# Patient Record
Sex: MALE
[De-identification: names, ages, dates, MRNs, and addresses within clinical notes are randomized per-mention and may not be internally consistent; named-entity substitution may affect disease eponyms.]

---

## 2021-09-05 ENCOUNTER — TRANSCRIPTION ENCOUNTER (OUTPATIENT)
Age: 66
End: 2021-09-05

## 2024-06-06 PROBLEM — Z00.00 ENCOUNTER FOR PREVENTIVE HEALTH EXAMINATION: Status: ACTIVE | Noted: 2024-06-06

## 2024-06-07 ENCOUNTER — NON-APPOINTMENT (OUTPATIENT)
Age: 69
End: 2024-06-07

## 2024-06-07 ENCOUNTER — APPOINTMENT (OUTPATIENT)
Dept: ORTHOPEDIC SURGERY | Facility: CLINIC | Age: 69
End: 2024-06-07

## 2024-07-23 ENCOUNTER — APPOINTMENT (OUTPATIENT)
Dept: ORTHOPEDIC SURGERY | Facility: CLINIC | Age: 69
End: 2024-07-23
Payer: MEDICARE

## 2024-07-23 DIAGNOSIS — S69.92XA UNSPECIFIED INJURY OF LEFT WRIST, HAND AND FINGER(S), INITIAL ENCOUNTER: ICD-10-CM

## 2024-07-23 PROCEDURE — 99203 OFFICE O/P NEW LOW 30 MIN: CPT | Mod: 25

## 2024-07-23 PROCEDURE — 73110 X-RAY EXAM OF WRIST: CPT | Mod: LT

## 2024-07-23 NOTE — ASSESSMENT
[FreeTextEntry1] : Recommending a thumb spica wrist brace.  MRI left wrist evaluate scaphoid.  Follow-up with MRI is completed in our hand department.

## 2024-07-23 NOTE — HISTORY OF PRESENT ILLNESS
[de-identified] : 68-year-old male comes in today for evaluation of his left wrist pain.  Patient states that he had a fall 2 months ago, was not seen or treated at the time of the injury.  History of cardiac surgery.  He states that they did go through his left wrist.  States surgery was in January.  History of diabetes, hypertension

## 2024-07-23 NOTE — IMAGING
[de-identified] : On examination of the left wrist no significant swelling, no ecchymosis, no erythema.  Skin is intact.  He has moderate point tenderness directly over the scaphoid/snuffbox.  Mild tenderness over the radial styloid.  No tenderness over the ulnar styloid, no tenderness of the TFCC.  No tenderness over the metacarpals or fingers.  Does make a full fist.  Discomfort to wrist flexion and extension.  Neurovascularly intact  X-ray left wrist in the office today no obvious fracture or dislocation, surgical clips most likely from prior cardiac surgery

## 2024-08-07 ENCOUNTER — APPOINTMENT (OUTPATIENT)
Dept: ORTHOPEDIC SURGERY | Facility: CLINIC | Age: 69
End: 2024-08-07

## 2024-08-15 ENCOUNTER — APPOINTMENT (OUTPATIENT)
Dept: MRI IMAGING | Facility: CLINIC | Age: 69
End: 2024-08-15
Payer: MEDICARE

## 2024-08-15 PROCEDURE — 73221 MRI JOINT UPR EXTREM W/O DYE: CPT | Mod: LT

## 2024-09-23 ENCOUNTER — APPOINTMENT (OUTPATIENT)
Dept: ORTHOPEDIC SURGERY | Facility: CLINIC | Age: 69
End: 2024-09-23
Payer: MEDICARE

## 2024-09-23 DIAGNOSIS — S69.92XA UNSPECIFIED INJURY OF LEFT WRIST, HAND AND FINGER(S), INITIAL ENCOUNTER: ICD-10-CM

## 2024-09-23 PROCEDURE — 99204 OFFICE O/P NEW MOD 45 MIN: CPT

## 2024-09-23 RX ORDER — DICLOFENAC SODIUM 50 MG/1
50 TABLET, DELAYED RELEASE ORAL
Qty: 60 | Refills: 1 | Status: ACTIVE | COMMUNITY
Start: 2024-09-23 | End: 1900-01-01

## 2024-09-23 NOTE — ASSESSMENT
[FreeTextEntry1] : Patient comes in with complaints of left wrist pain.  He had it a CABG in January.  About 2 to 3 months afterwards he fell around February or March onto his left wrist.  He thought he sprained it and it was getting better but he still has pain.  He comes in with complaints of significant pain in the wrist.  He was sent to get an MRI.  He was given a cock up wrist splint he does not wear it regularly.  He takes ibuprofen on a as needed basis.  lt wrist: tenderness to palpationGlobally throughout the wrist, tender palpation over SL, LT, TFCC, tender palpation over volar wrist, tender palpation over snuffbox, full range of motion of fingers, negative provocative test, neurovascularly intact  MRI of the left wrist shows moderate TFCC tear, radiocarpal effusion and mild tendinitis no evidence of fracture  Discussed that the object seen on his x-ray is from a prior surgery. 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  I believe that was most likely present prior to his fall but everything got worse afterwards. Discussed benefit to inflamed areas can be expected while wearing a brace as it relieves pressure on the aforementioned areas in his left wrist. I recommend wearing a brace whenever possible. The goal is to shut down his wrist to minimize exacerbation of the inflammation.  Discussed option for another rx of an nsaid if Ibuprofen stops showing any benefit. will f/u in 1 month for evaluation.

## 2024-10-23 ENCOUNTER — APPOINTMENT (OUTPATIENT)
Dept: ORTHOPEDIC SURGERY | Facility: CLINIC | Age: 69
End: 2024-10-23

## 2025-01-01 ENCOUNTER — INPATIENT (INPATIENT)
Facility: HOSPITAL | Age: 70
LOS: 1 days | Discharge: HOME HEALTH | DRG: 189 | End: 2025-06-03
Attending: INTERNAL MEDICINE | Admitting: STUDENT IN AN ORGANIZED HEALTH CARE EDUCATION/TRAINING PROGRAM
Payer: MEDICARE

## 2025-01-01 ENCOUNTER — INPATIENT (INPATIENT)
Facility: HOSPITAL | Age: 70
LOS: 3 days | DRG: 870 | End: 2025-06-10
Attending: INTERNAL MEDICINE | Admitting: INTERNAL MEDICINE
Payer: MEDICARE

## 2025-01-01 ENCOUNTER — TRANSCRIPTION ENCOUNTER (OUTPATIENT)
Age: 70
End: 2025-01-01

## 2025-01-01 ENCOUNTER — RESULT REVIEW (OUTPATIENT)
Age: 70
End: 2025-01-01

## 2025-01-01 ENCOUNTER — NON-APPOINTMENT (OUTPATIENT)
Age: 70
End: 2025-01-01

## 2025-01-01 VITALS
HEART RATE: 66 BPM | DIASTOLIC BLOOD PRESSURE: 73 MMHG | RESPIRATION RATE: 16 BRPM | SYSTOLIC BLOOD PRESSURE: 162 MMHG | OXYGEN SATURATION: 100 %

## 2025-01-01 VITALS — RESPIRATION RATE: 8 BRPM

## 2025-01-01 VITALS
RESPIRATION RATE: 20 BRPM | HEART RATE: 64 BPM | OXYGEN SATURATION: 100 % | SYSTOLIC BLOOD PRESSURE: 179 MMHG | DIASTOLIC BLOOD PRESSURE: 83 MMHG

## 2025-01-01 VITALS — RESPIRATION RATE: 20 BRPM | HEART RATE: 82 BPM | DIASTOLIC BLOOD PRESSURE: 70 MMHG | SYSTOLIC BLOOD PRESSURE: 140 MMHG

## 2025-01-01 DIAGNOSIS — R09.89 OTHER SPECIFIED SYMPTOMS AND SIGNS INVOLVING THE CIRCULATORY AND RESPIRATORY SYSTEMS: ICD-10-CM

## 2025-01-01 DIAGNOSIS — J96.01 ACUTE RESPIRATORY FAILURE WITH HYPOXIA: ICD-10-CM

## 2025-01-01 DIAGNOSIS — I46.9 CARDIAC ARREST, CAUSE UNSPECIFIED: ICD-10-CM

## 2025-01-01 DIAGNOSIS — Z51.5 ENCOUNTER FOR PALLIATIVE CARE: ICD-10-CM

## 2025-01-01 DIAGNOSIS — Z71.89 OTHER SPECIFIED COUNSELING: ICD-10-CM

## 2025-01-01 DIAGNOSIS — J81.0 ACUTE PULMONARY EDEMA: ICD-10-CM

## 2025-01-01 LAB
A1C WITH ESTIMATED AVERAGE GLUCOSE RESULT: 6.5 % — HIGH (ref 4–5.6)
A1C WITH ESTIMATED AVERAGE GLUCOSE RESULT: 6.8 % — HIGH (ref 4–5.6)
ALBUMIN SERPL ELPH-MCNC: 2.4 G/DL — LOW (ref 3.5–5.2)
ALBUMIN SERPL ELPH-MCNC: 2.5 G/DL — LOW (ref 3.5–5.2)
ALBUMIN SERPL ELPH-MCNC: 2.5 G/DL — LOW (ref 3.5–5.2)
ALBUMIN SERPL ELPH-MCNC: 2.7 G/DL — LOW (ref 3.5–5.2)
ALBUMIN SERPL ELPH-MCNC: 3 G/DL — LOW (ref 3.5–5.2)
ALBUMIN SERPL ELPH-MCNC: 3.1 G/DL — LOW (ref 3.5–5.2)
ALBUMIN SERPL ELPH-MCNC: 3.3 G/DL — LOW (ref 3.5–5.2)
ALBUMIN SERPL ELPH-MCNC: 3.4 G/DL — LOW (ref 3.5–5.2)
ALBUMIN SERPL ELPH-MCNC: 3.5 G/DL — SIGNIFICANT CHANGE UP (ref 3.5–5.2)
ALBUMIN SERPL ELPH-MCNC: 4.1 G/DL — SIGNIFICANT CHANGE UP (ref 3.5–5.2)
ALBUMIN SERPL ELPH-MCNC: 4.4 G/DL — SIGNIFICANT CHANGE UP (ref 3.5–5.2)
ALDOST SERPL-MCNC: 4.4 NG/DL — SIGNIFICANT CHANGE UP
ALDOSTERONE / DIRECT RENIN RATIO RESULT: 0.1 RATIO — SIGNIFICANT CHANGE UP
ALP SERPL-CCNC: 106 U/L — SIGNIFICANT CHANGE UP (ref 30–115)
ALP SERPL-CCNC: 112 U/L — SIGNIFICANT CHANGE UP (ref 30–115)
ALP SERPL-CCNC: 116 U/L — HIGH (ref 30–115)
ALP SERPL-CCNC: 123 U/L — HIGH (ref 30–115)
ALP SERPL-CCNC: 128 U/L — HIGH (ref 30–115)
ALP SERPL-CCNC: 146 U/L — HIGH (ref 30–115)
ALP SERPL-CCNC: 156 U/L — HIGH (ref 30–115)
ALP SERPL-CCNC: 157 U/L — HIGH (ref 30–115)
ALP SERPL-CCNC: 83 U/L — SIGNIFICANT CHANGE UP (ref 30–115)
ALP SERPL-CCNC: 89 U/L — SIGNIFICANT CHANGE UP (ref 30–115)
ALP SERPL-CCNC: 98 U/L — SIGNIFICANT CHANGE UP (ref 30–115)
ALT FLD-CCNC: 18 U/L — SIGNIFICANT CHANGE UP (ref 0–41)
ALT FLD-CCNC: 20 U/L — SIGNIFICANT CHANGE UP (ref 0–41)
ALT FLD-CCNC: 20 U/L — SIGNIFICANT CHANGE UP (ref 0–41)
ALT FLD-CCNC: 26 U/L — SIGNIFICANT CHANGE UP (ref 0–41)
ALT FLD-CCNC: 26 U/L — SIGNIFICANT CHANGE UP (ref 0–41)
ALT FLD-CCNC: 27 U/L — SIGNIFICANT CHANGE UP (ref 0–41)
ALT FLD-CCNC: 27 U/L — SIGNIFICANT CHANGE UP (ref 0–41)
ALT FLD-CCNC: 36 U/L — SIGNIFICANT CHANGE UP (ref 0–41)
ALT FLD-CCNC: 40 U/L — SIGNIFICANT CHANGE UP (ref 0–41)
ALT FLD-CCNC: 41 U/L — SIGNIFICANT CHANGE UP (ref 0–41)
ALT FLD-CCNC: 44 U/L — HIGH (ref 0–41)
ALT FLD-CCNC: 52 U/L — HIGH (ref 0–41)
ALT FLD-CCNC: 66 U/L — HIGH (ref 0–41)
ANION GAP SERPL CALC-SCNC: 11 MMOL/L — SIGNIFICANT CHANGE UP (ref 7–14)
ANION GAP SERPL CALC-SCNC: 11 MMOL/L — SIGNIFICANT CHANGE UP (ref 7–14)
ANION GAP SERPL CALC-SCNC: 12 MMOL/L — SIGNIFICANT CHANGE UP (ref 7–14)
ANION GAP SERPL CALC-SCNC: 13 MMOL/L — SIGNIFICANT CHANGE UP (ref 7–14)
ANION GAP SERPL CALC-SCNC: 14 MMOL/L — SIGNIFICANT CHANGE UP (ref 7–14)
ANION GAP SERPL CALC-SCNC: 14 MMOL/L — SIGNIFICANT CHANGE UP (ref 7–14)
ANION GAP SERPL CALC-SCNC: 15 MMOL/L — HIGH (ref 7–14)
ANION GAP SERPL CALC-SCNC: 15 MMOL/L — HIGH (ref 7–14)
ANION GAP SERPL CALC-SCNC: 16 MMOL/L — HIGH (ref 7–14)
ANION GAP SERPL CALC-SCNC: 19 MMOL/L — HIGH (ref 7–14)
ANION GAP SERPL CALC-SCNC: 7 MMOL/L — SIGNIFICANT CHANGE UP (ref 7–14)
APPEARANCE UR: ABNORMAL
APPEARANCE UR: ABNORMAL
APPEARANCE UR: CLEAR — SIGNIFICANT CHANGE UP
APPEARANCE UR: CLEAR — SIGNIFICANT CHANGE UP
APTT BLD: 106.2 SEC — CRITICAL HIGH (ref 27–39.2)
APTT BLD: 113.1 SEC — CRITICAL HIGH (ref 27–39.2)
APTT BLD: 154.1 SEC — CRITICAL HIGH (ref 27–39.2)
APTT BLD: 27.7 SEC — SIGNIFICANT CHANGE UP (ref 27–39.2)
APTT BLD: 28.1 SEC — SIGNIFICANT CHANGE UP (ref 27–39.2)
APTT BLD: 28.2 SEC — SIGNIFICANT CHANGE UP (ref 27–39.2)
APTT BLD: 30.2 SEC — SIGNIFICANT CHANGE UP (ref 27–39.2)
APTT BLD: 68 SEC — HIGH (ref 27–39.2)
APTT BLD: 68.2 SEC — HIGH (ref 27–39.2)
APTT BLD: 77 SEC — CRITICAL HIGH (ref 27–39.2)
APTT BLD: 98.1 SEC — CRITICAL HIGH (ref 27–39.2)
APTT BLD: >200 SEC — CRITICAL HIGH (ref 27–39.2)
AST SERPL-CCNC: 16 U/L — SIGNIFICANT CHANGE UP (ref 0–41)
AST SERPL-CCNC: 20 U/L — SIGNIFICANT CHANGE UP (ref 0–41)
AST SERPL-CCNC: 26 U/L — SIGNIFICANT CHANGE UP (ref 0–41)
AST SERPL-CCNC: 28 U/L — SIGNIFICANT CHANGE UP (ref 0–41)
AST SERPL-CCNC: 31 U/L — SIGNIFICANT CHANGE UP (ref 0–41)
AST SERPL-CCNC: 35 U/L — SIGNIFICANT CHANGE UP (ref 0–41)
AST SERPL-CCNC: 36 U/L — SIGNIFICANT CHANGE UP (ref 0–41)
AST SERPL-CCNC: 45 U/L — HIGH (ref 0–41)
AST SERPL-CCNC: 62 U/L — HIGH (ref 0–41)
AST SERPL-CCNC: 92 U/L — HIGH (ref 0–41)
AST SERPL-CCNC: 98 U/L — HIGH (ref 0–41)
B-OH-BUTYR SERPL-SCNC: <0.2 MMOL/L — SIGNIFICANT CHANGE UP
B-OH-BUTYR SERPL-SCNC: <0.2 MMOL/L — SIGNIFICANT CHANGE UP
BASE EXCESS BLDA CALC-SCNC: -4.4 MMOL/L — LOW (ref -2–3)
BASE EXCESS BLDV CALC-SCNC: -6.7 MMOL/L — LOW (ref -2–3)
BASOPHILS # BLD AUTO: 0 K/UL — SIGNIFICANT CHANGE UP (ref 0–0.2)
BASOPHILS # BLD AUTO: 0.01 K/UL — SIGNIFICANT CHANGE UP (ref 0–0.2)
BASOPHILS # BLD AUTO: 0.02 K/UL — SIGNIFICANT CHANGE UP (ref 0–0.2)
BASOPHILS # BLD AUTO: 0.02 K/UL — SIGNIFICANT CHANGE UP (ref 0–0.2)
BASOPHILS # BLD AUTO: 0.04 K/UL — SIGNIFICANT CHANGE UP (ref 0–0.2)
BASOPHILS # BLD AUTO: 0.04 K/UL — SIGNIFICANT CHANGE UP (ref 0–0.2)
BASOPHILS # BLD AUTO: 0.06 K/UL — SIGNIFICANT CHANGE UP (ref 0–0.2)
BASOPHILS NFR BLD AUTO: 0 % — SIGNIFICANT CHANGE UP (ref 0–1)
BASOPHILS NFR BLD AUTO: 0.1 % — SIGNIFICANT CHANGE UP (ref 0–1)
BASOPHILS NFR BLD AUTO: 0.2 % — SIGNIFICANT CHANGE UP (ref 0–1)
BASOPHILS NFR BLD AUTO: 0.2 % — SIGNIFICANT CHANGE UP (ref 0–1)
BASOPHILS NFR BLD AUTO: 0.3 % — SIGNIFICANT CHANGE UP (ref 0–1)
BASOPHILS NFR BLD AUTO: 0.4 % — SIGNIFICANT CHANGE UP (ref 0–1)
BASOPHILS NFR BLD AUTO: 0.6 % — SIGNIFICANT CHANGE UP (ref 0–1)
BILIRUB SERPL-MCNC: 0.2 MG/DL — SIGNIFICANT CHANGE UP (ref 0.2–1.2)
BILIRUB SERPL-MCNC: 0.2 MG/DL — SIGNIFICANT CHANGE UP (ref 0.2–1.2)
BILIRUB SERPL-MCNC: 0.3 MG/DL — SIGNIFICANT CHANGE UP (ref 0.2–1.2)
BILIRUB SERPL-MCNC: 0.4 MG/DL — SIGNIFICANT CHANGE UP (ref 0.2–1.2)
BILIRUB SERPL-MCNC: 0.4 MG/DL — SIGNIFICANT CHANGE UP (ref 0.2–1.2)
BILIRUB SERPL-MCNC: 0.5 MG/DL — SIGNIFICANT CHANGE UP (ref 0.2–1.2)
BILIRUB SERPL-MCNC: 0.6 MG/DL — SIGNIFICANT CHANGE UP (ref 0.2–1.2)
BILIRUB UR-MCNC: ABNORMAL
BILIRUB UR-MCNC: NEGATIVE — SIGNIFICANT CHANGE UP
BUN SERPL-MCNC: 25 MG/DL — HIGH (ref 10–20)
BUN SERPL-MCNC: 27 MG/DL — HIGH (ref 10–20)
BUN SERPL-MCNC: 27 MG/DL — HIGH (ref 10–20)
BUN SERPL-MCNC: 32 MG/DL — HIGH (ref 10–20)
BUN SERPL-MCNC: 33 MG/DL — HIGH (ref 10–20)
BUN SERPL-MCNC: 36 MG/DL — HIGH (ref 10–20)
BUN SERPL-MCNC: 37 MG/DL — HIGH (ref 10–20)
BUN SERPL-MCNC: 37 MG/DL — HIGH (ref 10–20)
BUN SERPL-MCNC: 39 MG/DL — HIGH (ref 10–20)
BUN SERPL-MCNC: 39 MG/DL — HIGH (ref 10–20)
BUN SERPL-MCNC: 42 MG/DL — HIGH (ref 10–20)
BUN SERPL-MCNC: 43 MG/DL — HIGH (ref 10–20)
BUN SERPL-MCNC: 46 MG/DL — HIGH (ref 10–20)
BUN SERPL-MCNC: 46 MG/DL — HIGH (ref 10–20)
BUN SERPL-MCNC: 50 MG/DL — HIGH (ref 10–20)
BURR CELLS BLD QL SMEAR: PRESENT — SIGNIFICANT CHANGE UP
CA-I SERPL-SCNC: 1.2 MMOL/L — SIGNIFICANT CHANGE UP (ref 1.15–1.33)
CALCIUM SERPL-MCNC: 7.2 MG/DL — LOW (ref 8.4–10.5)
CALCIUM SERPL-MCNC: 7.3 MG/DL — LOW (ref 8.4–10.5)
CALCIUM SERPL-MCNC: 7.3 MG/DL — LOW (ref 8.4–10.5)
CALCIUM SERPL-MCNC: 7.5 MG/DL — LOW (ref 8.4–10.5)
CALCIUM SERPL-MCNC: 7.6 MG/DL — LOW (ref 8.4–10.5)
CALCIUM SERPL-MCNC: 7.9 MG/DL — LOW (ref 8.4–10.5)
CALCIUM SERPL-MCNC: 8.1 MG/DL — LOW (ref 8.4–10.5)
CALCIUM SERPL-MCNC: 8.4 MG/DL — SIGNIFICANT CHANGE UP (ref 8.4–10.5)
CALCIUM SERPL-MCNC: 8.5 MG/DL — SIGNIFICANT CHANGE UP (ref 8.4–10.5)
CALCIUM SERPL-MCNC: 8.6 MG/DL — SIGNIFICANT CHANGE UP (ref 8.4–10.5)
CALCIUM SERPL-MCNC: 8.9 MG/DL — SIGNIFICANT CHANGE UP (ref 8.4–10.5)
CALCIUM SERPL-MCNC: 9.1 MG/DL — SIGNIFICANT CHANGE UP (ref 8.4–10.5)
CALCIUM SERPL-MCNC: 9.3 MG/DL — SIGNIFICANT CHANGE UP (ref 8.4–10.5)
CHLORIDE SERPL-SCNC: 100 MMOL/L — SIGNIFICANT CHANGE UP (ref 98–110)
CHLORIDE SERPL-SCNC: 101 MMOL/L — SIGNIFICANT CHANGE UP (ref 98–110)
CHLORIDE SERPL-SCNC: 102 MMOL/L — SIGNIFICANT CHANGE UP (ref 98–110)
CHLORIDE SERPL-SCNC: 103 MMOL/L — SIGNIFICANT CHANGE UP (ref 98–110)
CHLORIDE SERPL-SCNC: 103 MMOL/L — SIGNIFICANT CHANGE UP (ref 98–110)
CHLORIDE SERPL-SCNC: 104 MMOL/L — SIGNIFICANT CHANGE UP (ref 98–110)
CHLORIDE SERPL-SCNC: 105 MMOL/L — SIGNIFICANT CHANGE UP (ref 98–110)
CHLORIDE SERPL-SCNC: 105 MMOL/L — SIGNIFICANT CHANGE UP (ref 98–110)
CHLORIDE SERPL-SCNC: 106 MMOL/L — SIGNIFICANT CHANGE UP (ref 98–110)
CHLORIDE SERPL-SCNC: 107 MMOL/L — SIGNIFICANT CHANGE UP (ref 98–110)
CHLORIDE SERPL-SCNC: 98 MMOL/L — SIGNIFICANT CHANGE UP (ref 98–110)
CHLORIDE SERPL-SCNC: 98 MMOL/L — SIGNIFICANT CHANGE UP (ref 98–110)
CHLORIDE SERPL-SCNC: 99 MMOL/L — SIGNIFICANT CHANGE UP (ref 98–110)
CHOLEST SERPL-MCNC: 205 MG/DL — HIGH
CHOLEST SERPL-MCNC: 223 MG/DL — HIGH
CO2 SERPL-SCNC: 18 MMOL/L — SIGNIFICANT CHANGE UP (ref 17–32)
CO2 SERPL-SCNC: 19 MMOL/L — SIGNIFICANT CHANGE UP (ref 17–32)
CO2 SERPL-SCNC: 19 MMOL/L — SIGNIFICANT CHANGE UP (ref 17–32)
CO2 SERPL-SCNC: 20 MMOL/L — SIGNIFICANT CHANGE UP (ref 17–32)
CO2 SERPL-SCNC: 21 MMOL/L — SIGNIFICANT CHANGE UP (ref 17–32)
CO2 SERPL-SCNC: 23 MMOL/L — SIGNIFICANT CHANGE UP (ref 17–32)
CO2 SERPL-SCNC: 24 MMOL/L — SIGNIFICANT CHANGE UP (ref 17–32)
CO2 SERPL-SCNC: 24 MMOL/L — SIGNIFICANT CHANGE UP (ref 17–32)
CO2 SERPL-SCNC: 25 MMOL/L — SIGNIFICANT CHANGE UP (ref 17–32)
COLOR SPEC: SIGNIFICANT CHANGE UP
COLOR SPEC: SIGNIFICANT CHANGE UP
COLOR SPEC: YELLOW — SIGNIFICANT CHANGE UP
COLOR SPEC: YELLOW — SIGNIFICANT CHANGE UP
CREAT ?TM UR-MCNC: 142 MG/DL — SIGNIFICANT CHANGE UP
CREAT ?TM UR-MCNC: 279 MG/DL — SIGNIFICANT CHANGE UP
CREAT ?TM UR-MCNC: 38 MG/DL — SIGNIFICANT CHANGE UP
CREAT SERPL-MCNC: 1.4 MG/DL — SIGNIFICANT CHANGE UP (ref 0.7–1.5)
CREAT SERPL-MCNC: 1.6 MG/DL — HIGH (ref 0.7–1.5)
CREAT SERPL-MCNC: 1.7 MG/DL — HIGH (ref 0.7–1.5)
CREAT SERPL-MCNC: 1.7 MG/DL — HIGH (ref 0.7–1.5)
CREAT SERPL-MCNC: 1.8 MG/DL — HIGH (ref 0.7–1.5)
CREAT SERPL-MCNC: 1.8 MG/DL — HIGH (ref 0.7–1.5)
CREAT SERPL-MCNC: 1.9 MG/DL — HIGH (ref 0.7–1.5)
CREAT SERPL-MCNC: 2 MG/DL — HIGH (ref 0.7–1.5)
CREAT SERPL-MCNC: 2.2 MG/DL — HIGH (ref 0.7–1.5)
CREAT SERPL-MCNC: 2.5 MG/DL — HIGH (ref 0.7–1.5)
CREAT SERPL-MCNC: 2.6 MG/DL — HIGH (ref 0.7–1.5)
CREAT SERPL-MCNC: 2.7 MG/DL — HIGH (ref 0.7–1.5)
CREAT SERPL-MCNC: 2.8 MG/DL — HIGH (ref 0.7–1.5)
CREAT SERPL-MCNC: 2.9 MG/DL — HIGH (ref 0.7–1.5)
CREAT SERPL-MCNC: 2.9 MG/DL — HIGH (ref 0.7–1.5)
DIFF PNL FLD: ABNORMAL
DIFF PNL FLD: NEGATIVE — SIGNIFICANT CHANGE UP
EGFR: 22 ML/MIN/1.73M2 — LOW
EGFR: 23 ML/MIN/1.73M2 — LOW
EGFR: 23 ML/MIN/1.73M2 — LOW
EGFR: 24 ML/MIN/1.73M2 — LOW
EGFR: 24 ML/MIN/1.73M2 — LOW
EGFR: 26 ML/MIN/1.73M2 — LOW
EGFR: 26 ML/MIN/1.73M2 — LOW
EGFR: 27 ML/MIN/1.73M2 — LOW
EGFR: 27 ML/MIN/1.73M2 — LOW
EGFR: 31 ML/MIN/1.73M2 — LOW
EGFR: 31 ML/MIN/1.73M2 — LOW
EGFR: 35 ML/MIN/1.73M2 — LOW
EGFR: 35 ML/MIN/1.73M2 — LOW
EGFR: 37 ML/MIN/1.73M2 — LOW
EGFR: 37 ML/MIN/1.73M2 — LOW
EGFR: 40 ML/MIN/1.73M2 — LOW
EGFR: 43 ML/MIN/1.73M2 — LOW
EGFR: 46 ML/MIN/1.73M2 — LOW
EGFR: 46 ML/MIN/1.73M2 — LOW
EGFR: 54 ML/MIN/1.73M2 — LOW
EGFR: 54 ML/MIN/1.73M2 — LOW
EOSINOPHIL # BLD AUTO: 0.01 K/UL — SIGNIFICANT CHANGE UP (ref 0–0.7)
EOSINOPHIL # BLD AUTO: 0.02 K/UL — SIGNIFICANT CHANGE UP (ref 0–0.7)
EOSINOPHIL # BLD AUTO: 0.07 K/UL — SIGNIFICANT CHANGE UP (ref 0–0.7)
EOSINOPHIL # BLD AUTO: 0.21 K/UL — SIGNIFICANT CHANGE UP (ref 0–0.7)
EOSINOPHIL # BLD AUTO: 0.24 K/UL — SIGNIFICANT CHANGE UP (ref 0–0.7)
EOSINOPHIL # BLD AUTO: 0.26 K/UL — SIGNIFICANT CHANGE UP (ref 0–0.7)
EOSINOPHIL # BLD AUTO: 0.42 K/UL — SIGNIFICANT CHANGE UP (ref 0–0.7)
EOSINOPHIL NFR BLD AUTO: 0.1 % — SIGNIFICANT CHANGE UP (ref 0–8)
EOSINOPHIL NFR BLD AUTO: 0.2 % — SIGNIFICANT CHANGE UP (ref 0–8)
EOSINOPHIL NFR BLD AUTO: 0.7 % — SIGNIFICANT CHANGE UP (ref 0–8)
EOSINOPHIL NFR BLD AUTO: 1.8 % — SIGNIFICANT CHANGE UP (ref 0–8)
EOSINOPHIL NFR BLD AUTO: 2.5 % — SIGNIFICANT CHANGE UP (ref 0–8)
EOSINOPHIL NFR BLD AUTO: 2.5 % — SIGNIFICANT CHANGE UP (ref 0–8)
EOSINOPHIL NFR BLD AUTO: 4 % — SIGNIFICANT CHANGE UP (ref 0–8)
ESTIMATED AVERAGE GLUCOSE: 140 MG/DL — HIGH (ref 68–114)
ESTIMATED AVERAGE GLUCOSE: 148 MG/DL — HIGH (ref 68–114)
FLUAV AG NPH QL: SIGNIFICANT CHANGE UP
FLUAV AG NPH QL: SIGNIFICANT CHANGE UP
FLUBV AG NPH QL: SIGNIFICANT CHANGE UP
FLUBV AG NPH QL: SIGNIFICANT CHANGE UP
GAS PNL BLDA: SIGNIFICANT CHANGE UP
GAS PNL BLDA: SIGNIFICANT CHANGE UP
GAS PNL BLDV: 133 MMOL/L — LOW (ref 136–145)
GAS PNL BLDV: SIGNIFICANT CHANGE UP
GAS PNL BLDV: SIGNIFICANT CHANGE UP
GLUCOSE BLDC GLUCOMTR-MCNC: 104 MG/DL — HIGH (ref 70–99)
GLUCOSE BLDC GLUCOMTR-MCNC: 119 MG/DL — HIGH (ref 70–99)
GLUCOSE BLDC GLUCOMTR-MCNC: 119 MG/DL — HIGH (ref 70–99)
GLUCOSE BLDC GLUCOMTR-MCNC: 130 MG/DL — HIGH (ref 70–99)
GLUCOSE BLDC GLUCOMTR-MCNC: 138 MG/DL — HIGH (ref 70–99)
GLUCOSE BLDC GLUCOMTR-MCNC: 141 MG/DL — HIGH (ref 70–99)
GLUCOSE BLDC GLUCOMTR-MCNC: 157 MG/DL — HIGH (ref 70–99)
GLUCOSE BLDC GLUCOMTR-MCNC: 170 MG/DL — HIGH (ref 70–99)
GLUCOSE BLDC GLUCOMTR-MCNC: 171 MG/DL — HIGH (ref 70–99)
GLUCOSE BLDC GLUCOMTR-MCNC: 179 MG/DL — HIGH (ref 70–99)
GLUCOSE BLDC GLUCOMTR-MCNC: 191 MG/DL — HIGH (ref 70–99)
GLUCOSE BLDC GLUCOMTR-MCNC: 191 MG/DL — HIGH (ref 70–99)
GLUCOSE BLDC GLUCOMTR-MCNC: 197 MG/DL — HIGH (ref 70–99)
GLUCOSE BLDC GLUCOMTR-MCNC: 208 MG/DL — HIGH (ref 70–99)
GLUCOSE BLDC GLUCOMTR-MCNC: 212 MG/DL — HIGH (ref 70–99)
GLUCOSE BLDC GLUCOMTR-MCNC: 218 MG/DL — HIGH (ref 70–99)
GLUCOSE BLDC GLUCOMTR-MCNC: 225 MG/DL — HIGH (ref 70–99)
GLUCOSE BLDC GLUCOMTR-MCNC: 228 MG/DL — HIGH (ref 70–99)
GLUCOSE BLDC GLUCOMTR-MCNC: 230 MG/DL — HIGH (ref 70–99)
GLUCOSE BLDC GLUCOMTR-MCNC: 240 MG/DL — HIGH (ref 70–99)
GLUCOSE BLDC GLUCOMTR-MCNC: 250 MG/DL — HIGH (ref 70–99)
GLUCOSE BLDC GLUCOMTR-MCNC: 257 MG/DL — HIGH (ref 70–99)
GLUCOSE BLDC GLUCOMTR-MCNC: 263 MG/DL — HIGH (ref 70–99)
GLUCOSE BLDC GLUCOMTR-MCNC: 280 MG/DL — HIGH (ref 70–99)
GLUCOSE BLDC GLUCOMTR-MCNC: 339 MG/DL — HIGH (ref 70–99)
GLUCOSE BLDC GLUCOMTR-MCNC: 58 MG/DL — LOW (ref 70–99)
GLUCOSE BLDC GLUCOMTR-MCNC: 68 MG/DL — LOW (ref 70–99)
GLUCOSE BLDC GLUCOMTR-MCNC: 78 MG/DL — SIGNIFICANT CHANGE UP (ref 70–99)
GLUCOSE BLDC GLUCOMTR-MCNC: 83 MG/DL — SIGNIFICANT CHANGE UP (ref 70–99)
GLUCOSE BLDC GLUCOMTR-MCNC: 86 MG/DL — SIGNIFICANT CHANGE UP (ref 70–99)
GLUCOSE BLDC GLUCOMTR-MCNC: 86 MG/DL — SIGNIFICANT CHANGE UP (ref 70–99)
GLUCOSE BLDC GLUCOMTR-MCNC: 91 MG/DL — SIGNIFICANT CHANGE UP (ref 70–99)
GLUCOSE BLDC GLUCOMTR-MCNC: 92 MG/DL — SIGNIFICANT CHANGE UP (ref 70–99)
GLUCOSE BLDC GLUCOMTR-MCNC: 92 MG/DL — SIGNIFICANT CHANGE UP (ref 70–99)
GLUCOSE BLDC GLUCOMTR-MCNC: 93 MG/DL — SIGNIFICANT CHANGE UP (ref 70–99)
GLUCOSE SERPL-MCNC: 169 MG/DL — HIGH (ref 70–99)
GLUCOSE SERPL-MCNC: 171 MG/DL — HIGH (ref 70–99)
GLUCOSE SERPL-MCNC: 178 MG/DL — HIGH (ref 70–99)
GLUCOSE SERPL-MCNC: 182 MG/DL — HIGH (ref 70–99)
GLUCOSE SERPL-MCNC: 191 MG/DL — HIGH (ref 70–99)
GLUCOSE SERPL-MCNC: 243 MG/DL — HIGH (ref 70–99)
GLUCOSE SERPL-MCNC: 247 MG/DL — HIGH (ref 70–99)
GLUCOSE SERPL-MCNC: 253 MG/DL — HIGH (ref 70–99)
GLUCOSE SERPL-MCNC: 295 MG/DL — HIGH (ref 70–99)
GLUCOSE SERPL-MCNC: 437 MG/DL — HIGH (ref 70–99)
GLUCOSE SERPL-MCNC: 61 MG/DL — LOW (ref 70–99)
GLUCOSE SERPL-MCNC: 61 MG/DL — LOW (ref 70–99)
GLUCOSE SERPL-MCNC: 73 MG/DL — SIGNIFICANT CHANGE UP (ref 70–99)
GLUCOSE SERPL-MCNC: 81 MG/DL — SIGNIFICANT CHANGE UP (ref 70–99)
GLUCOSE SERPL-MCNC: 91 MG/DL — SIGNIFICANT CHANGE UP (ref 70–99)
GLUCOSE UR QL: NEGATIVE MG/DL — SIGNIFICANT CHANGE UP
HCO3 BLDA-SCNC: 20 MMOL/L — LOW (ref 21–28)
HCO3 BLDV-SCNC: 22 MMOL/L — SIGNIFICANT CHANGE UP (ref 22–29)
HCT VFR BLD CALC: 20.6 % — LOW (ref 42–52)
HCT VFR BLD CALC: 21 % — LOW (ref 42–52)
HCT VFR BLD CALC: 22.9 % — LOW (ref 42–52)
HCT VFR BLD CALC: 24.5 % — LOW (ref 42–52)
HCT VFR BLD CALC: 24.8 % — LOW (ref 42–52)
HCT VFR BLD CALC: 25.4 % — LOW (ref 42–52)
HCT VFR BLD CALC: 27.1 % — LOW (ref 42–52)
HCT VFR BLD CALC: 27.8 % — LOW (ref 42–52)
HCT VFR BLD CALC: 28.4 % — LOW (ref 42–52)
HCT VFR BLD CALC: 29.4 % — LOW (ref 42–52)
HCT VFR BLD CALC: 29.6 % — LOW (ref 42–52)
HCT VFR BLD CALC: 31.8 % — LOW (ref 42–52)
HCT VFR BLD CALC: 32.2 % — LOW (ref 42–52)
HCT VFR BLD CALC: 33.8 % — LOW (ref 42–52)
HCT VFR BLD CALC: 36.6 % — LOW (ref 42–52)
HCT VFR BLD CALC: 38.5 % — LOW (ref 42–52)
HCT VFR BLDA CALC: 40 % — SIGNIFICANT CHANGE UP (ref 39–51)
HDLC SERPL-MCNC: 54 MG/DL — SIGNIFICANT CHANGE UP
HDLC SERPL-MCNC: 55 MG/DL — SIGNIFICANT CHANGE UP
HGB BLD CALC-MCNC: 13.2 G/DL — SIGNIFICANT CHANGE UP (ref 12.6–17.4)
HGB BLD-MCNC: 10.4 G/DL — LOW (ref 14–18)
HGB BLD-MCNC: 10.5 G/DL — LOW (ref 14–18)
HGB BLD-MCNC: 10.5 G/DL — LOW (ref 14–18)
HGB BLD-MCNC: 11.6 G/DL — LOW (ref 14–18)
HGB BLD-MCNC: 11.9 G/DL — LOW (ref 14–18)
HGB BLD-MCNC: 6.7 G/DL — CRITICAL LOW (ref 14–18)
HGB BLD-MCNC: 6.8 G/DL — CRITICAL LOW (ref 14–18)
HGB BLD-MCNC: 7.4 G/DL — LOW (ref 14–18)
HGB BLD-MCNC: 7.9 G/DL — LOW (ref 14–18)
HGB BLD-MCNC: 8 G/DL — LOW (ref 14–18)
HGB BLD-MCNC: 8.1 G/DL — LOW (ref 14–18)
HGB BLD-MCNC: 8.6 G/DL — LOW (ref 14–18)
HGB BLD-MCNC: 8.7 G/DL — LOW (ref 14–18)
HGB BLD-MCNC: 8.9 G/DL — LOW (ref 14–18)
HGB BLD-MCNC: 9.2 G/DL — LOW (ref 14–18)
HGB BLD-MCNC: 9.8 G/DL — LOW (ref 14–18)
HOROWITZ INDEX BLDA+IHG-RTO: 40 — SIGNIFICANT CHANGE UP
IMM GRANULOCYTES NFR BLD AUTO: 0.3 % — SIGNIFICANT CHANGE UP (ref 0.1–0.3)
IMM GRANULOCYTES NFR BLD AUTO: 0.4 % — HIGH (ref 0.1–0.3)
IMM GRANULOCYTES NFR BLD AUTO: 0.4 % — HIGH (ref 0.1–0.3)
IMM GRANULOCYTES NFR BLD AUTO: 0.5 % — HIGH (ref 0.1–0.3)
IMM GRANULOCYTES NFR BLD AUTO: 0.7 % — HIGH (ref 0.1–0.3)
IMM GRANULOCYTES NFR BLD AUTO: 0.8 % — HIGH (ref 0.1–0.3)
INR BLD: 1.22 RATIO — SIGNIFICANT CHANGE UP (ref 0.65–1.3)
INR BLD: 1.39 RATIO — HIGH (ref 0.65–1.3)
INR BLD: 1.52 RATIO — HIGH (ref 0.65–1.3)
KETONES UR QL: ABNORMAL MG/DL
KETONES UR QL: NEGATIVE MG/DL — SIGNIFICANT CHANGE UP
LACTATE BLDV-MCNC: 5.2 MMOL/L — CRITICAL HIGH (ref 0.5–2)
LACTATE SERPL-SCNC: 2.1 MMOL/L — HIGH (ref 0.7–2)
LACTATE SERPL-SCNC: 2.6 MMOL/L — HIGH (ref 0.7–2)
LACTATE SERPL-SCNC: 5.5 MMOL/L — CRITICAL HIGH (ref 0.7–2)
LDLC SERPL-MCNC: 133 MG/DL — HIGH
LDLC SERPL-MCNC: 155 MG/DL — HIGH
LEUKOCYTE ESTERASE UR-ACNC: ABNORMAL
LEUKOCYTE ESTERASE UR-ACNC: ABNORMAL
LEUKOCYTE ESTERASE UR-ACNC: NEGATIVE — SIGNIFICANT CHANGE UP
LEUKOCYTE ESTERASE UR-ACNC: NEGATIVE — SIGNIFICANT CHANGE UP
LIPID PNL WITH DIRECT LDL SERPL: 133 MG/DL — HIGH
LIPID PNL WITH DIRECT LDL SERPL: 155 MG/DL — HIGH
LYMPHOCYTES # BLD AUTO: 1.11 K/UL — LOW (ref 1.2–3.4)
LYMPHOCYTES # BLD AUTO: 1.19 K/UL — LOW (ref 1.2–3.4)
LYMPHOCYTES # BLD AUTO: 1.28 K/UL — SIGNIFICANT CHANGE UP (ref 1.2–3.4)
LYMPHOCYTES # BLD AUTO: 11.1 % — LOW (ref 20.5–51.1)
LYMPHOCYTES # BLD AUTO: 12.5 % — LOW (ref 20.5–51.1)
LYMPHOCYTES # BLD AUTO: 14.4 % — LOW (ref 20.5–51.1)
LYMPHOCYTES # BLD AUTO: 18 % — LOW (ref 20.5–51.1)
LYMPHOCYTES # BLD AUTO: 18.1 % — LOW (ref 20.5–51.1)
LYMPHOCYTES # BLD AUTO: 2.09 K/UL — SIGNIFICANT CHANGE UP (ref 1.2–3.4)
LYMPHOCYTES # BLD AUTO: 2.36 K/UL — SIGNIFICANT CHANGE UP (ref 1.2–3.4)
LYMPHOCYTES # BLD AUTO: 4.48 K/UL — HIGH (ref 1.2–3.4)
LYMPHOCYTES # BLD AUTO: 42.8 % — SIGNIFICANT CHANGE UP (ref 20.5–51.1)
LYMPHOCYTES # BLD AUTO: 43 % — SIGNIFICANT CHANGE UP (ref 20.5–51.1)
LYMPHOCYTES # BLD AUTO: 5.74 K/UL — HIGH (ref 1.2–3.4)
MAGNESIUM SERPL-MCNC: 1.8 MG/DL — SIGNIFICANT CHANGE UP (ref 1.8–2.4)
MAGNESIUM SERPL-MCNC: 2.1 MG/DL — SIGNIFICANT CHANGE UP (ref 1.8–2.4)
MAGNESIUM SERPL-MCNC: 2.2 MG/DL — SIGNIFICANT CHANGE UP (ref 1.8–2.4)
MAGNESIUM SERPL-MCNC: 2.4 MG/DL — SIGNIFICANT CHANGE UP (ref 1.8–2.4)
MANUAL SMEAR VERIFICATION: SIGNIFICANT CHANGE UP
MCHC RBC-ENTMCNC: 27.4 PG — SIGNIFICANT CHANGE UP (ref 27–31)
MCHC RBC-ENTMCNC: 27.6 PG — SIGNIFICANT CHANGE UP (ref 27–31)
MCHC RBC-ENTMCNC: 27.7 PG — SIGNIFICANT CHANGE UP (ref 27–31)
MCHC RBC-ENTMCNC: 27.7 PG — SIGNIFICANT CHANGE UP (ref 27–31)
MCHC RBC-ENTMCNC: 27.9 PG — SIGNIFICANT CHANGE UP (ref 27–31)
MCHC RBC-ENTMCNC: 28 PG — SIGNIFICANT CHANGE UP (ref 27–31)
MCHC RBC-ENTMCNC: 28.1 PG — SIGNIFICANT CHANGE UP (ref 27–31)
MCHC RBC-ENTMCNC: 28.2 PG — SIGNIFICANT CHANGE UP (ref 27–31)
MCHC RBC-ENTMCNC: 28.2 PG — SIGNIFICANT CHANGE UP (ref 27–31)
MCHC RBC-ENTMCNC: 28.3 PG — SIGNIFICANT CHANGE UP (ref 27–31)
MCHC RBC-ENTMCNC: 28.6 PG — SIGNIFICANT CHANGE UP (ref 27–31)
MCHC RBC-ENTMCNC: 28.6 PG — SIGNIFICANT CHANGE UP (ref 27–31)
MCHC RBC-ENTMCNC: 30.9 G/DL — LOW (ref 32–37)
MCHC RBC-ENTMCNC: 30.9 G/DL — LOW (ref 32–37)
MCHC RBC-ENTMCNC: 31.1 G/DL — LOW (ref 32–37)
MCHC RBC-ENTMCNC: 31.3 G/DL — LOW (ref 32–37)
MCHC RBC-ENTMCNC: 31.3 G/DL — LOW (ref 32–37)
MCHC RBC-ENTMCNC: 31.5 G/DL — LOW (ref 32–37)
MCHC RBC-ENTMCNC: 31.7 G/DL — LOW (ref 32–37)
MCHC RBC-ENTMCNC: 32.1 G/DL — SIGNIFICANT CHANGE UP (ref 32–37)
MCHC RBC-ENTMCNC: 32.2 G/DL — SIGNIFICANT CHANGE UP (ref 32–37)
MCHC RBC-ENTMCNC: 32.3 G/DL — SIGNIFICANT CHANGE UP (ref 32–37)
MCHC RBC-ENTMCNC: 32.4 G/DL — SIGNIFICANT CHANGE UP (ref 32–37)
MCHC RBC-ENTMCNC: 32.5 G/DL — SIGNIFICANT CHANGE UP (ref 32–37)
MCHC RBC-ENTMCNC: 32.6 G/DL — SIGNIFICANT CHANGE UP (ref 32–37)
MCHC RBC-ENTMCNC: 32.7 G/DL — SIGNIFICANT CHANGE UP (ref 32–37)
MCHC RBC-ENTMCNC: 32.7 G/DL — SIGNIFICANT CHANGE UP (ref 32–37)
MCHC RBC-ENTMCNC: 33.1 G/DL — SIGNIFICANT CHANGE UP (ref 32–37)
MCV RBC AUTO: 84.4 FL — SIGNIFICANT CHANGE UP (ref 80–94)
MCV RBC AUTO: 85.1 FL — SIGNIFICANT CHANGE UP (ref 80–94)
MCV RBC AUTO: 85.8 FL — SIGNIFICANT CHANGE UP (ref 80–94)
MCV RBC AUTO: 86.1 FL — SIGNIFICANT CHANGE UP (ref 80–94)
MCV RBC AUTO: 86.1 FL — SIGNIFICANT CHANGE UP (ref 80–94)
MCV RBC AUTO: 86.2 FL — SIGNIFICANT CHANGE UP (ref 80–94)
MCV RBC AUTO: 87.2 FL — SIGNIFICANT CHANGE UP (ref 80–94)
MCV RBC AUTO: 87.6 FL — SIGNIFICANT CHANGE UP (ref 80–94)
MCV RBC AUTO: 88.3 FL — SIGNIFICANT CHANGE UP (ref 80–94)
MCV RBC AUTO: 89.1 FL — SIGNIFICANT CHANGE UP (ref 80–94)
MCV RBC AUTO: 89.3 FL — SIGNIFICANT CHANGE UP (ref 80–94)
MCV RBC AUTO: 89.4 FL — SIGNIFICANT CHANGE UP (ref 80–94)
MCV RBC AUTO: 89.6 FL — SIGNIFICANT CHANGE UP (ref 80–94)
MCV RBC AUTO: 89.6 FL — SIGNIFICANT CHANGE UP (ref 80–94)
MCV RBC AUTO: 89.7 FL — SIGNIFICANT CHANGE UP (ref 80–94)
MCV RBC AUTO: 90.6 FL — SIGNIFICANT CHANGE UP (ref 80–94)
METAMYELOCYTES # FLD: 0.9 % — HIGH (ref 0–0)
METAMYELOCYTES NFR BLD: 0.9 % — HIGH (ref 0–0)
METANEPHRINE, PL: 38.5 PG/ML — SIGNIFICANT CHANGE UP (ref 0–88)
MONOCYTES # BLD AUTO: 0.85 K/UL — HIGH (ref 0.1–0.6)
MONOCYTES # BLD AUTO: 0.86 K/UL — HIGH (ref 0.1–0.6)
MONOCYTES # BLD AUTO: 1.01 K/UL — HIGH (ref 0.1–0.6)
MONOCYTES # BLD AUTO: 1.05 K/UL — HIGH (ref 0.1–0.6)
MONOCYTES # BLD AUTO: 1.06 K/UL — HIGH (ref 0.1–0.6)
MONOCYTES # BLD AUTO: 1.46 K/UL — HIGH (ref 0.1–0.6)
MONOCYTES # BLD AUTO: 1.55 K/UL — HIGH (ref 0.1–0.6)
MONOCYTES NFR BLD AUTO: 10.9 % — HIGH (ref 1.7–9.3)
MONOCYTES NFR BLD AUTO: 12.3 % — HIGH (ref 1.7–9.3)
MONOCYTES NFR BLD AUTO: 15.1 % — HIGH (ref 1.7–9.3)
MONOCYTES NFR BLD AUTO: 8 % — SIGNIFICANT CHANGE UP (ref 1.7–9.3)
MONOCYTES NFR BLD AUTO: 8.3 % — SIGNIFICANT CHANGE UP (ref 1.7–9.3)
MONOCYTES NFR BLD AUTO: 8.5 % — SIGNIFICANT CHANGE UP (ref 1.7–9.3)
MONOCYTES NFR BLD AUTO: 9.2 % — SIGNIFICANT CHANGE UP (ref 1.7–9.3)
MRSA PCR RESULT.: NEGATIVE — SIGNIFICANT CHANGE UP
NEUTROPHILS # BLD AUTO: 4.56 K/UL — SIGNIFICANT CHANGE UP (ref 1.4–6.5)
NEUTROPHILS # BLD AUTO: 5.73 K/UL — SIGNIFICANT CHANGE UP (ref 1.4–6.5)
NEUTROPHILS # BLD AUTO: 5.76 K/UL — SIGNIFICANT CHANGE UP (ref 1.4–6.5)
NEUTROPHILS # BLD AUTO: 7.08 K/UL — HIGH (ref 1.4–6.5)
NEUTROPHILS # BLD AUTO: 7.88 K/UL — HIGH (ref 1.4–6.5)
NEUTROPHILS # BLD AUTO: 8.35 K/UL — HIGH (ref 1.4–6.5)
NEUTROPHILS # BLD AUTO: 9.62 K/UL — HIGH (ref 1.4–6.5)
NEUTROPHILS NFR BLD AUTO: 41.8 % — LOW (ref 42.2–75.2)
NEUTROPHILS NFR BLD AUTO: 43.7 % — SIGNIFICANT CHANGE UP (ref 42.2–75.2)
NEUTROPHILS NFR BLD AUTO: 68.9 % — SIGNIFICANT CHANGE UP (ref 42.2–75.2)
NEUTROPHILS NFR BLD AUTO: 70 % — SIGNIFICANT CHANGE UP (ref 42.2–75.2)
NEUTROPHILS NFR BLD AUTO: 72.1 % — SIGNIFICANT CHANGE UP (ref 42.2–75.2)
NEUTROPHILS NFR BLD AUTO: 73.1 % — SIGNIFICANT CHANGE UP (ref 42.2–75.2)
NEUTROPHILS NFR BLD AUTO: 78.8 % — HIGH (ref 42.2–75.2)
NEUTS BAND # BLD: 0.9 % — SIGNIFICANT CHANGE UP (ref 0–6)
NEUTS BAND NFR BLD: 0.9 % — SIGNIFICANT CHANGE UP (ref 0–6)
NITRITE UR-MCNC: NEGATIVE — SIGNIFICANT CHANGE UP
NONHDLC SERPL-MCNC: 151 MG/DL — HIGH
NONHDLC SERPL-MCNC: 168 MG/DL — HIGH
NORMETANEPHRINE, PL: 540.2 PG/ML — HIGH (ref 0–285.2)
NRBC BLD AUTO-RTO: 0 /100 WBCS — SIGNIFICANT CHANGE UP (ref 0–0)
NT-PROBNP SERPL-SCNC: 2375 PG/ML — HIGH (ref 0–300)
NT-PROBNP SERPL-SCNC: 881 PG/ML — HIGH (ref 0–300)
OSMOLALITY UR: 342 MOS/KG — SIGNIFICANT CHANGE UP (ref 50–1200)
OSMOLALITY UR: 344 MOS/KG — SIGNIFICANT CHANGE UP (ref 50–1200)
OSMOLALITY UR: 352 MOS/KG — SIGNIFICANT CHANGE UP (ref 50–1200)
PCO2 BLDA: 35 MMHG — SIGNIFICANT CHANGE UP (ref 35–48)
PCO2 BLDV: 60 MMHG — HIGH (ref 42–55)
PH BLDA: 7.37 — SIGNIFICANT CHANGE UP (ref 7.35–7.45)
PH BLDV: 7.18 — CRITICAL LOW (ref 7.32–7.43)
PH UR: 5 — SIGNIFICANT CHANGE UP (ref 5–8)
PH UR: 5 — SIGNIFICANT CHANGE UP (ref 5–8)
PH UR: 5.5 — SIGNIFICANT CHANGE UP (ref 5–8)
PH UR: 5.5 — SIGNIFICANT CHANGE UP (ref 5–8)
PHOSPHATE SERPL-MCNC: 3.2 MG/DL — SIGNIFICANT CHANGE UP (ref 2.1–4.9)
PHOSPHATE SERPL-MCNC: 3.9 MG/DL — SIGNIFICANT CHANGE UP (ref 2.1–4.9)
PHOSPHATE SERPL-MCNC: 4 MG/DL — SIGNIFICANT CHANGE UP (ref 2.1–4.9)
PHOSPHATE SERPL-MCNC: 6 MG/DL — HIGH (ref 2.1–4.9)
PHOSPHATE SERPL-MCNC: 6.3 MG/DL — HIGH (ref 2.1–4.9)
PHOSPHATE SERPL-MCNC: 6.4 MG/DL — HIGH (ref 2.1–4.9)
PHOSPHATE SERPL-MCNC: 7.2 MG/DL — HIGH (ref 2.1–4.9)
PLAT MORPH BLD: NORMAL — SIGNIFICANT CHANGE UP
PLATELET # BLD AUTO: 125 K/UL — LOW (ref 130–400)
PLATELET # BLD AUTO: 141 K/UL — SIGNIFICANT CHANGE UP (ref 130–400)
PLATELET # BLD AUTO: 141 K/UL — SIGNIFICANT CHANGE UP (ref 130–400)
PLATELET # BLD AUTO: 149 K/UL — SIGNIFICANT CHANGE UP (ref 130–400)
PLATELET # BLD AUTO: 162 K/UL — SIGNIFICANT CHANGE UP (ref 130–400)
PLATELET # BLD AUTO: 166 K/UL — SIGNIFICANT CHANGE UP (ref 130–400)
PLATELET # BLD AUTO: 172 K/UL — SIGNIFICANT CHANGE UP (ref 130–400)
PLATELET # BLD AUTO: 178 K/UL — SIGNIFICANT CHANGE UP (ref 130–400)
PLATELET # BLD AUTO: 179 K/UL — SIGNIFICANT CHANGE UP (ref 130–400)
PLATELET # BLD AUTO: 182 K/UL — SIGNIFICANT CHANGE UP (ref 130–400)
PLATELET # BLD AUTO: 186 K/UL — SIGNIFICANT CHANGE UP (ref 130–400)
PLATELET # BLD AUTO: 186 K/UL — SIGNIFICANT CHANGE UP (ref 130–400)
PLATELET # BLD AUTO: 188 K/UL — SIGNIFICANT CHANGE UP (ref 130–400)
PLATELET # BLD AUTO: 195 K/UL — SIGNIFICANT CHANGE UP (ref 130–400)
PLATELET # BLD AUTO: 207 K/UL — SIGNIFICANT CHANGE UP (ref 130–400)
PLATELET # BLD AUTO: 232 K/UL — SIGNIFICANT CHANGE UP (ref 130–400)
PMV BLD: 11 FL — HIGH (ref 7.4–10.4)
PMV BLD: 11.1 FL — HIGH (ref 7.4–10.4)
PMV BLD: 11.5 FL — HIGH (ref 7.4–10.4)
PMV BLD: 11.5 FL — HIGH (ref 7.4–10.4)
PMV BLD: 11.7 FL — HIGH (ref 7.4–10.4)
PMV BLD: 11.8 FL — HIGH (ref 7.4–10.4)
PMV BLD: 11.9 FL — HIGH (ref 7.4–10.4)
PMV BLD: 12 FL — HIGH (ref 7.4–10.4)
PMV BLD: 12.1 FL — HIGH (ref 7.4–10.4)
PMV BLD: 12.2 FL — HIGH (ref 7.4–10.4)
PMV BLD: 12.4 FL — HIGH (ref 7.4–10.4)
PMV BLD: 12.5 FL — HIGH (ref 7.4–10.4)
PMV BLD: 12.6 FL — HIGH (ref 7.4–10.4)
PMV BLD: 12.7 FL — HIGH (ref 7.4–10.4)
PMV BLD: 12.7 FL — HIGH (ref 7.4–10.4)
PMV BLD: 12.9 FL — HIGH (ref 7.4–10.4)
PO2 BLDA: 100 MMHG — SIGNIFICANT CHANGE UP (ref 83–108)
PO2 BLDV: 110 MMHG — HIGH (ref 25–45)
POIKILOCYTOSIS BLD QL AUTO: SIGNIFICANT CHANGE UP
POLYCHROMASIA BLD QL SMEAR: SLIGHT — SIGNIFICANT CHANGE UP
POTASSIUM BLDV-SCNC: 5.1 MMOL/L — SIGNIFICANT CHANGE UP (ref 3.5–5.1)
POTASSIUM SERPL-MCNC: 3.6 MMOL/L — SIGNIFICANT CHANGE UP (ref 3.5–5)
POTASSIUM SERPL-MCNC: 3.6 MMOL/L — SIGNIFICANT CHANGE UP (ref 3.5–5)
POTASSIUM SERPL-MCNC: 3.8 MMOL/L — SIGNIFICANT CHANGE UP (ref 3.5–5)
POTASSIUM SERPL-MCNC: 3.9 MMOL/L — SIGNIFICANT CHANGE UP (ref 3.5–5)
POTASSIUM SERPL-MCNC: 4 MMOL/L — SIGNIFICANT CHANGE UP (ref 3.5–5)
POTASSIUM SERPL-MCNC: 4.3 MMOL/L — SIGNIFICANT CHANGE UP (ref 3.5–5)
POTASSIUM SERPL-MCNC: 4.3 MMOL/L — SIGNIFICANT CHANGE UP (ref 3.5–5)
POTASSIUM SERPL-MCNC: 4.4 MMOL/L — SIGNIFICANT CHANGE UP (ref 3.5–5)
POTASSIUM SERPL-MCNC: 4.8 MMOL/L — SIGNIFICANT CHANGE UP (ref 3.5–5)
POTASSIUM SERPL-MCNC: 4.8 MMOL/L — SIGNIFICANT CHANGE UP (ref 3.5–5)
POTASSIUM SERPL-MCNC: 4.9 MMOL/L — SIGNIFICANT CHANGE UP (ref 3.5–5)
POTASSIUM SERPL-MCNC: 5.3 MMOL/L — HIGH (ref 3.5–5)
POTASSIUM SERPL-MCNC: 5.4 MMOL/L — HIGH (ref 3.5–5)
POTASSIUM SERPL-MCNC: 5.4 MMOL/L — HIGH (ref 3.5–5)
POTASSIUM SERPL-MCNC: 5.8 MMOL/L — HIGH (ref 3.5–5)
POTASSIUM SERPL-SCNC: 3.6 MMOL/L — SIGNIFICANT CHANGE UP (ref 3.5–5)
POTASSIUM SERPL-SCNC: 3.6 MMOL/L — SIGNIFICANT CHANGE UP (ref 3.5–5)
POTASSIUM SERPL-SCNC: 3.8 MMOL/L — SIGNIFICANT CHANGE UP (ref 3.5–5)
POTASSIUM SERPL-SCNC: 3.9 MMOL/L — SIGNIFICANT CHANGE UP (ref 3.5–5)
POTASSIUM SERPL-SCNC: 4 MMOL/L — SIGNIFICANT CHANGE UP (ref 3.5–5)
POTASSIUM SERPL-SCNC: 4.3 MMOL/L — SIGNIFICANT CHANGE UP (ref 3.5–5)
POTASSIUM SERPL-SCNC: 4.3 MMOL/L — SIGNIFICANT CHANGE UP (ref 3.5–5)
POTASSIUM SERPL-SCNC: 4.4 MMOL/L — SIGNIFICANT CHANGE UP (ref 3.5–5)
POTASSIUM SERPL-SCNC: 4.8 MMOL/L — SIGNIFICANT CHANGE UP (ref 3.5–5)
POTASSIUM SERPL-SCNC: 4.8 MMOL/L — SIGNIFICANT CHANGE UP (ref 3.5–5)
POTASSIUM SERPL-SCNC: 4.9 MMOL/L — SIGNIFICANT CHANGE UP (ref 3.5–5)
POTASSIUM SERPL-SCNC: 5.3 MMOL/L — HIGH (ref 3.5–5)
POTASSIUM SERPL-SCNC: 5.4 MMOL/L — HIGH (ref 3.5–5)
POTASSIUM SERPL-SCNC: 5.4 MMOL/L — HIGH (ref 3.5–5)
POTASSIUM SERPL-SCNC: 5.8 MMOL/L — HIGH (ref 3.5–5)
POTASSIUM UR-SCNC: 33 MMOL/L — SIGNIFICANT CHANGE UP
POTASSIUM UR-SCNC: 49 MMOL/L — SIGNIFICANT CHANGE UP
POTASSIUM UR-SCNC: 64 MMOL/L — SIGNIFICANT CHANGE UP
PROCALCITONIN SERPL-MCNC: 0.07 NG/ML — SIGNIFICANT CHANGE UP (ref 0.02–0.1)
PROT ?TM UR-MCNC: 167 MG/DL — SIGNIFICANT CHANGE UP
PROT ?TM UR-MCNC: 186 MG/DL — SIGNIFICANT CHANGE UP
PROT ?TM UR-MCNC: 64 MG/DL — SIGNIFICANT CHANGE UP
PROT SERPL-MCNC: 4.4 G/DL — LOW (ref 6–8)
PROT SERPL-MCNC: 4.6 G/DL — LOW (ref 6–8)
PROT SERPL-MCNC: 4.7 G/DL — LOW (ref 6–8)
PROT SERPL-MCNC: 4.8 G/DL — LOW (ref 6–8)
PROT SERPL-MCNC: 4.9 G/DL — LOW (ref 6–8)
PROT SERPL-MCNC: 5.1 G/DL — LOW (ref 6–8)
PROT SERPL-MCNC: 5.3 G/DL — LOW (ref 6–8)
PROT SERPL-MCNC: 5.5 G/DL — LOW (ref 6–8)
PROT SERPL-MCNC: 5.8 G/DL — LOW (ref 6–8)
PROT SERPL-MCNC: 5.9 G/DL — LOW (ref 6–8)
PROT SERPL-MCNC: 5.9 G/DL — LOW (ref 6–8)
PROT SERPL-MCNC: 6.9 G/DL — SIGNIFICANT CHANGE UP (ref 6–8)
PROT SERPL-MCNC: 7.3 G/DL — SIGNIFICANT CHANGE UP (ref 6–8)
PROT UR-MCNC: 100 MG/DL
PROT UR-MCNC: 100 MG/DL
PROT UR-MCNC: 300 MG/DL
PROT UR-MCNC: 300 MG/DL
PROT/CREAT UR-RTO: 0.7 RATIO — HIGH (ref 0–0.2)
PROT/CREAT UR-RTO: 1.2 RATIO — HIGH (ref 0–0.2)
PROT/CREAT UR-RTO: 1.7 RATIO — HIGH (ref 0–0.2)
PROTHROM AB SERPL-ACNC: 14.5 SEC — HIGH (ref 9.95–12.87)
PROTHROM AB SERPL-ACNC: 16.5 SEC — HIGH (ref 9.95–12.87)
PROTHROM AB SERPL-ACNC: 18.1 SEC — HIGH (ref 9.95–12.87)
RAPID RVP RESULT: SIGNIFICANT CHANGE UP
RBC # BLD: 2.39 M/UL — LOW (ref 4.7–6.1)
RBC # BLD: 2.44 M/UL — LOW (ref 4.7–6.1)
RBC # BLD: 2.67 M/UL — LOW (ref 4.7–6.1)
RBC # BLD: 2.81 M/UL — LOW (ref 4.7–6.1)
RBC # BLD: 2.83 M/UL — LOW (ref 4.7–6.1)
RBC # BLD: 2.84 M/UL — LOW (ref 4.7–6.1)
RBC # BLD: 3.04 M/UL — LOW (ref 4.7–6.1)
RBC # BLD: 3.1 M/UL — LOW (ref 4.7–6.1)
RBC # BLD: 3.17 M/UL — LOW (ref 4.7–6.1)
RBC # BLD: 3.28 M/UL — LOW (ref 4.7–6.1)
RBC # BLD: 3.48 M/UL — LOW (ref 4.7–6.1)
RBC # BLD: 3.74 M/UL — LOW (ref 4.7–6.1)
RBC # BLD: 3.77 M/UL — LOW (ref 4.7–6.1)
RBC # BLD: 3.83 M/UL — LOW (ref 4.7–6.1)
RBC # BLD: 4.1 M/UL — LOW (ref 4.7–6.1)
RBC # BLD: 4.25 M/UL — LOW (ref 4.7–6.1)
RBC # FLD: 13.5 % — SIGNIFICANT CHANGE UP (ref 11.5–14.5)
RBC # FLD: 13.9 % — SIGNIFICANT CHANGE UP (ref 11.5–14.5)
RBC # FLD: 14 % — SIGNIFICANT CHANGE UP (ref 11.5–14.5)
RBC # FLD: 14.1 % — SIGNIFICANT CHANGE UP (ref 11.5–14.5)
RBC # FLD: 14.2 % — SIGNIFICANT CHANGE UP (ref 11.5–14.5)
RBC # FLD: 14.3 % — SIGNIFICANT CHANGE UP (ref 11.5–14.5)
RBC # FLD: 14.4 % — SIGNIFICANT CHANGE UP (ref 11.5–14.5)
RBC # FLD: 14.6 % — HIGH (ref 11.5–14.5)
RBC BLD AUTO: ABNORMAL
RENIN DIRECT, PLASMA: 50.9 PG/ML — HIGH
RENIN PLAS-CCNC: 9.49 NG/ML/HR — HIGH (ref 0.17–5.38)
RSV RNA NPH QL NAA+NON-PROBE: SIGNIFICANT CHANGE UP
RSV RNA NPH QL NAA+NON-PROBE: SIGNIFICANT CHANGE UP
SAO2 % BLDA: 98.5 % — HIGH (ref 94–98)
SAO2 % BLDV: 98.3 % — HIGH (ref 67–88)
SARS-COV-2 RNA SPEC QL NAA+PROBE: SIGNIFICANT CHANGE UP
SMUDGE CELLS # BLD: PRESENT — SIGNIFICANT CHANGE UP
SODIUM SERPL-SCNC: 133 MMOL/L — LOW (ref 135–146)
SODIUM SERPL-SCNC: 134 MMOL/L — LOW (ref 135–146)
SODIUM SERPL-SCNC: 134 MMOL/L — LOW (ref 135–146)
SODIUM SERPL-SCNC: 137 MMOL/L — SIGNIFICANT CHANGE UP (ref 135–146)
SODIUM SERPL-SCNC: 137 MMOL/L — SIGNIFICANT CHANGE UP (ref 135–146)
SODIUM SERPL-SCNC: 138 MMOL/L — SIGNIFICANT CHANGE UP (ref 135–146)
SODIUM SERPL-SCNC: 139 MMOL/L — SIGNIFICANT CHANGE UP (ref 135–146)
SODIUM SERPL-SCNC: 139 MMOL/L — SIGNIFICANT CHANGE UP (ref 135–146)
SODIUM SERPL-SCNC: 141 MMOL/L — SIGNIFICANT CHANGE UP (ref 135–146)
SODIUM SERPL-SCNC: 142 MMOL/L — SIGNIFICANT CHANGE UP (ref 135–146)
SODIUM UR-SCNC: 40 MMOL/L — SIGNIFICANT CHANGE UP
SODIUM UR-SCNC: 98 MMOL/L — SIGNIFICANT CHANGE UP
SODIUM UR-SCNC: <20 MMOL/L — SIGNIFICANT CHANGE UP
SOURCE RESPIRATORY: SIGNIFICANT CHANGE UP
SOURCE RESPIRATORY: SIGNIFICANT CHANGE UP
SP GR SPEC: 1.01 — SIGNIFICANT CHANGE UP (ref 1–1.03)
SP GR SPEC: 1.01 — SIGNIFICANT CHANGE UP (ref 1–1.03)
SP GR SPEC: 1.02 — SIGNIFICANT CHANGE UP (ref 1–1.03)
SP GR SPEC: 1.03 — SIGNIFICANT CHANGE UP (ref 1–1.03)
TRIGL SERPL-MCNC: 74 MG/DL — SIGNIFICANT CHANGE UP
TRIGL SERPL-MCNC: 74 MG/DL — SIGNIFICANT CHANGE UP
TROPONIN T, HIGH SENSITIVITY RESULT: 100 NG/L — CRITICAL HIGH (ref 6–21)
TROPONIN T, HIGH SENSITIVITY RESULT: 113 NG/L — CRITICAL HIGH (ref 6–21)
TROPONIN T, HIGH SENSITIVITY RESULT: 135 NG/L — CRITICAL HIGH (ref 6–21)
TROPONIN T, HIGH SENSITIVITY RESULT: 147 NG/L — CRITICAL HIGH (ref 6–21)
TROPONIN T, HIGH SENSITIVITY RESULT: 21 NG/L — SIGNIFICANT CHANGE UP (ref 6–21)
TROPONIN T, HIGH SENSITIVITY RESULT: 62 NG/L — CRITICAL HIGH (ref 6–21)
TROPONIN T, HIGH SENSITIVITY RESULT: 80 NG/L — CRITICAL HIGH (ref 6–21)
TROPONIN T, HIGH SENSITIVITY RESULT: 99 NG/L — CRITICAL HIGH (ref 6–21)
TSH SERPL-MCNC: 0.72 UIU/ML — SIGNIFICANT CHANGE UP (ref 0.27–4.2)
TSH SERPL-MCNC: 0.75 UIU/ML — SIGNIFICANT CHANGE UP (ref 0.27–4.2)
TSH SERPL-MCNC: 1.07 UIU/ML — SIGNIFICANT CHANGE UP (ref 0.27–4.2)
UROBILINOGEN FLD QL: 0.2 MG/DL — SIGNIFICANT CHANGE UP (ref 0.2–1)
UROBILINOGEN FLD QL: 0.2 MG/DL — SIGNIFICANT CHANGE UP (ref 0.2–1)
UROBILINOGEN FLD QL: 1 MG/DL — SIGNIFICANT CHANGE UP (ref 0.2–1)
UROBILINOGEN FLD QL: 2 MG/DL (ref 0.2–1)
UUN UR-MCNC: 205 MG/DL — SIGNIFICANT CHANGE UP
UUN UR-MCNC: 364 MG/DL — SIGNIFICANT CHANGE UP
UUN UR-MCNC: 386 MG/DL — SIGNIFICANT CHANGE UP
VARIANT LYMPHS # BLD: 0.9 % — SIGNIFICANT CHANGE UP (ref 0–5)
VARIANT LYMPHS NFR BLD MANUAL: 0.9 % — SIGNIFICANT CHANGE UP (ref 0–5)
WBC # BLD: 10 K/UL — SIGNIFICANT CHANGE UP (ref 4.8–10.8)
WBC # BLD: 10.1 K/UL — SIGNIFICANT CHANGE UP (ref 4.8–10.8)
WBC # BLD: 10.27 K/UL — SIGNIFICANT CHANGE UP (ref 4.8–10.8)
WBC # BLD: 10.37 K/UL — SIGNIFICANT CHANGE UP (ref 4.8–10.8)
WBC # BLD: 10.42 K/UL — SIGNIFICANT CHANGE UP (ref 4.8–10.8)
WBC # BLD: 11.56 K/UL — HIGH (ref 4.8–10.8)
WBC # BLD: 11.73 K/UL — HIGH (ref 4.8–10.8)
WBC # BLD: 13.14 K/UL — HIGH (ref 4.8–10.8)
WBC # BLD: 13.41 K/UL — HIGH (ref 4.8–10.8)
WBC # BLD: 15.45 K/UL — HIGH (ref 4.8–10.8)
WBC # BLD: 7.53 K/UL — SIGNIFICANT CHANGE UP (ref 4.8–10.8)
WBC # BLD: 8.24 K/UL — SIGNIFICANT CHANGE UP (ref 4.8–10.8)
WBC # BLD: 8.86 K/UL — SIGNIFICANT CHANGE UP (ref 4.8–10.8)
WBC # BLD: 9.11 K/UL — SIGNIFICANT CHANGE UP (ref 4.8–10.8)
WBC # BLD: 9.15 K/UL — SIGNIFICANT CHANGE UP (ref 4.8–10.8)
WBC # BLD: 9.33 K/UL — SIGNIFICANT CHANGE UP (ref 4.8–10.8)
WBC # FLD AUTO: 10 K/UL — SIGNIFICANT CHANGE UP (ref 4.8–10.8)
WBC # FLD AUTO: 10.1 K/UL — SIGNIFICANT CHANGE UP (ref 4.8–10.8)
WBC # FLD AUTO: 10.27 K/UL — SIGNIFICANT CHANGE UP (ref 4.8–10.8)
WBC # FLD AUTO: 10.37 K/UL — SIGNIFICANT CHANGE UP (ref 4.8–10.8)
WBC # FLD AUTO: 10.42 K/UL — SIGNIFICANT CHANGE UP (ref 4.8–10.8)
WBC # FLD AUTO: 11.56 K/UL — HIGH (ref 4.8–10.8)
WBC # FLD AUTO: 11.73 K/UL — HIGH (ref 4.8–10.8)
WBC # FLD AUTO: 13.14 K/UL — HIGH (ref 4.8–10.8)
WBC # FLD AUTO: 13.41 K/UL — HIGH (ref 4.8–10.8)
WBC # FLD AUTO: 15.45 K/UL — HIGH (ref 4.8–10.8)
WBC # FLD AUTO: 7.53 K/UL — SIGNIFICANT CHANGE UP (ref 4.8–10.8)
WBC # FLD AUTO: 8.24 K/UL — SIGNIFICANT CHANGE UP (ref 4.8–10.8)
WBC # FLD AUTO: 8.86 K/UL — SIGNIFICANT CHANGE UP (ref 4.8–10.8)
WBC # FLD AUTO: 9.11 K/UL — SIGNIFICANT CHANGE UP (ref 4.8–10.8)
WBC # FLD AUTO: 9.15 K/UL — SIGNIFICANT CHANGE UP (ref 4.8–10.8)
WBC # FLD AUTO: 9.33 K/UL — SIGNIFICANT CHANGE UP (ref 4.8–10.8)

## 2025-01-01 PROCEDURE — 99291 CRITICAL CARE FIRST HOUR: CPT

## 2025-01-01 PROCEDURE — 83935 ASSAY OF URINE OSMOLALITY: CPT

## 2025-01-01 PROCEDURE — C9254: CPT

## 2025-01-01 PROCEDURE — 82570 ASSAY OF URINE CREATININE: CPT

## 2025-01-01 PROCEDURE — 93306 TTE W/DOPPLER COMPLETE: CPT | Mod: 26

## 2025-01-01 PROCEDURE — 84244 ASSAY OF RENIN: CPT

## 2025-01-01 PROCEDURE — 85730 THROMBOPLASTIN TIME PARTIAL: CPT

## 2025-01-01 PROCEDURE — 95720 EEG PHY/QHP EA INCR W/VEEG: CPT

## 2025-01-01 PROCEDURE — 85610 PROTHROMBIN TIME: CPT

## 2025-01-01 PROCEDURE — 99233 SBSQ HOSP IP/OBS HIGH 50: CPT

## 2025-01-01 PROCEDURE — 80048 BASIC METABOLIC PNL TOTAL CA: CPT

## 2025-01-01 PROCEDURE — 85027 COMPLETE CBC AUTOMATED: CPT

## 2025-01-01 PROCEDURE — 85018 HEMOGLOBIN: CPT

## 2025-01-01 PROCEDURE — 94003 VENT MGMT INPAT SUBQ DAY: CPT

## 2025-01-01 PROCEDURE — 84132 ASSAY OF SERUM POTASSIUM: CPT

## 2025-01-01 PROCEDURE — 99233 SBSQ HOSP IP/OBS HIGH 50: CPT | Mod: 25

## 2025-01-01 PROCEDURE — 70450 CT HEAD/BRAIN W/O DYE: CPT

## 2025-01-01 PROCEDURE — 71045 X-RAY EXAM CHEST 1 VIEW: CPT | Mod: 26

## 2025-01-01 PROCEDURE — 84443 ASSAY THYROID STIM HORMONE: CPT

## 2025-01-01 PROCEDURE — 85025 COMPLETE CBC W/AUTO DIFF WBC: CPT

## 2025-01-01 PROCEDURE — 83735 ASSAY OF MAGNESIUM: CPT

## 2025-01-01 PROCEDURE — 86900 BLOOD TYPING SEROLOGIC ABO: CPT

## 2025-01-01 PROCEDURE — 84300 ASSAY OF URINE SODIUM: CPT

## 2025-01-01 PROCEDURE — 99232 SBSQ HOSP IP/OBS MODERATE 35: CPT

## 2025-01-01 PROCEDURE — 93308 TTE F-UP OR LMTD: CPT

## 2025-01-01 PROCEDURE — 86901 BLOOD TYPING SEROLOGIC RH(D): CPT

## 2025-01-01 PROCEDURE — 84484 ASSAY OF TROPONIN QUANT: CPT

## 2025-01-01 PROCEDURE — 82088 ASSAY OF ALDOSTERONE: CPT

## 2025-01-01 PROCEDURE — 72125 CT NECK SPINE W/O DYE: CPT | Mod: 26

## 2025-01-01 PROCEDURE — 94640 AIRWAY INHALATION TREATMENT: CPT

## 2025-01-01 PROCEDURE — 93308 TTE F-UP OR LMTD: CPT | Mod: 26

## 2025-01-01 PROCEDURE — 99291 CRITICAL CARE FIRST HOUR: CPT | Mod: GC

## 2025-01-01 PROCEDURE — 95700 EEG CONT REC W/VID EEG TECH: CPT

## 2025-01-01 PROCEDURE — 93005 ELECTROCARDIOGRAM TRACING: CPT

## 2025-01-01 PROCEDURE — 87640 STAPH A DNA AMP PROBE: CPT

## 2025-01-01 PROCEDURE — 84295 ASSAY OF SERUM SODIUM: CPT

## 2025-01-01 PROCEDURE — 80053 COMPREHEN METABOLIC PANEL: CPT

## 2025-01-01 PROCEDURE — 87040 BLOOD CULTURE FOR BACTERIA: CPT

## 2025-01-01 PROCEDURE — 83605 ASSAY OF LACTIC ACID: CPT

## 2025-01-01 PROCEDURE — 83036 HEMOGLOBIN GLYCOSYLATED A1C: CPT

## 2025-01-01 PROCEDURE — 36415 COLL VENOUS BLD VENIPUNCTURE: CPT

## 2025-01-01 PROCEDURE — 83835 ASSAY OF METANEPHRINES: CPT

## 2025-01-01 PROCEDURE — 83520 IMMUNOASSAY QUANT NOS NONAB: CPT

## 2025-01-01 PROCEDURE — 99223 1ST HOSP IP/OBS HIGH 75: CPT | Mod: 25

## 2025-01-01 PROCEDURE — 70450 CT HEAD/BRAIN W/O DYE: CPT | Mod: 26

## 2025-01-01 PROCEDURE — 95819 EEG AWAKE AND ASLEEP: CPT | Mod: 26

## 2025-01-01 PROCEDURE — 87641 MR-STAPH DNA AMP PROBE: CPT

## 2025-01-01 PROCEDURE — 82803 BLOOD GASES ANY COMBINATION: CPT

## 2025-01-01 PROCEDURE — 71275 CT ANGIOGRAPHY CHEST: CPT | Mod: 26

## 2025-01-01 PROCEDURE — 85014 HEMATOCRIT: CPT

## 2025-01-01 PROCEDURE — 93010 ELECTROCARDIOGRAM REPORT: CPT

## 2025-01-01 PROCEDURE — 99291 CRITICAL CARE FIRST HOUR: CPT | Mod: 25

## 2025-01-01 PROCEDURE — 93010 ELECTROCARDIOGRAM REPORT: CPT | Mod: 77

## 2025-01-01 PROCEDURE — 93010 ELECTROCARDIOGRAM REPORT: CPT | Mod: 76

## 2025-01-01 PROCEDURE — 84540 ASSAY OF URINE/UREA-N: CPT

## 2025-01-01 PROCEDURE — 82330 ASSAY OF CALCIUM: CPT

## 2025-01-01 PROCEDURE — 93975 VASCULAR STUDY: CPT

## 2025-01-01 PROCEDURE — 71045 X-RAY EXAM CHEST 1 VIEW: CPT

## 2025-01-01 PROCEDURE — 0241U: CPT

## 2025-01-01 PROCEDURE — 84100 ASSAY OF PHOSPHORUS: CPT

## 2025-01-01 PROCEDURE — 0225U NFCT DS DNA&RNA 21 SARSCOV2: CPT

## 2025-01-01 PROCEDURE — 95711 VEEG 2-12 HR UNMONITORED: CPT

## 2025-01-01 PROCEDURE — 82010 KETONE BODYS QUAN: CPT

## 2025-01-01 PROCEDURE — 99223 1ST HOSP IP/OBS HIGH 75: CPT

## 2025-01-01 PROCEDURE — 95819 EEG AWAKE AND ASLEEP: CPT

## 2025-01-01 PROCEDURE — 93306 TTE W/DOPPLER COMPLETE: CPT

## 2025-01-01 PROCEDURE — 84133 ASSAY OF URINE POTASSIUM: CPT

## 2025-01-01 PROCEDURE — 99222 1ST HOSP IP/OBS MODERATE 55: CPT

## 2025-01-01 PROCEDURE — 82962 GLUCOSE BLOOD TEST: CPT

## 2025-01-01 PROCEDURE — 86850 RBC ANTIBODY SCREEN: CPT

## 2025-01-01 PROCEDURE — 99497 ADVNCD CARE PLAN 30 MIN: CPT | Mod: 25

## 2025-01-01 PROCEDURE — 94760 N-INVAS EAR/PLS OXIMETRY 1: CPT

## 2025-01-01 PROCEDURE — 81001 URINALYSIS AUTO W/SCOPE: CPT

## 2025-01-01 PROCEDURE — 94660 CPAP INITIATION&MGMT: CPT

## 2025-01-01 PROCEDURE — 95714 VEEG EA 12-26 HR UNMNTR: CPT

## 2025-01-01 PROCEDURE — 93307 TTE W/O DOPPLER COMPLETE: CPT

## 2025-01-01 PROCEDURE — 80061 LIPID PANEL: CPT

## 2025-01-01 PROCEDURE — 93975 VASCULAR STUDY: CPT | Mod: 26

## 2025-01-01 PROCEDURE — 84156 ASSAY OF PROTEIN URINE: CPT

## 2025-01-01 PROCEDURE — 74177 CT ABD & PELVIS W/CONTRAST: CPT | Mod: 26

## 2025-01-01 PROCEDURE — 84145 PROCALCITONIN (PCT): CPT

## 2025-01-01 PROCEDURE — 99238 HOSP IP/OBS DSCHRG MGMT 30/<: CPT

## 2025-01-01 RX ORDER — PROPOFOL 10 MG/ML
0.49 INJECTION, EMULSION INTRAVENOUS
Qty: 1000 | Refills: 0 | Status: DISCONTINUED | OUTPATIENT
Start: 2025-01-01 | End: 2025-01-01

## 2025-01-01 RX ORDER — PIPERACILLIN-TAZO-DEXTROSE,ISO 3.375G/5
3.38 IV SOLUTION, PIGGYBACK PREMIX FROZEN(ML) INTRAVENOUS ONCE
Refills: 0 | Status: COMPLETED | OUTPATIENT
Start: 2025-01-01 | End: 2025-01-01

## 2025-01-01 RX ORDER — DEXTROSE 50 % IN WATER 50 %
25 SYRINGE (ML) INTRAVENOUS ONCE
Refills: 0 | Status: DISCONTINUED | OUTPATIENT
Start: 2025-01-01 | End: 2025-01-01

## 2025-01-01 RX ORDER — FENTANYL CITRATE-0.9 % NACL/PF 100MCG/2ML
0.5 SYRINGE (ML) INTRAVENOUS
Qty: 2500 | Refills: 0 | Status: DISCONTINUED | OUTPATIENT
Start: 2025-01-01 | End: 2025-01-01

## 2025-01-01 RX ORDER — ASPIRIN 325 MG
1 TABLET ORAL
Refills: 0 | DISCHARGE

## 2025-01-01 RX ORDER — DOFETILIDE 0.5 MG/1
1000 CAPSULE ORAL ONCE
Refills: 0 | Status: DISCONTINUED | OUTPATIENT
Start: 2025-01-01 | End: 2025-01-01

## 2025-01-01 RX ORDER — HEPARIN SODIUM 1000 [USP'U]/ML
5000 INJECTION INTRAVENOUS; SUBCUTANEOUS EVERY 8 HOURS
Refills: 0 | Status: DISCONTINUED | OUTPATIENT
Start: 2025-01-01 | End: 2025-01-01

## 2025-01-01 RX ORDER — PROPOFOL 10 MG/ML
20 INJECTION, EMULSION INTRAVENOUS
Qty: 1000 | Refills: 0 | Status: DISCONTINUED | OUTPATIENT
Start: 2025-01-01 | End: 2025-01-01

## 2025-01-01 RX ORDER — INSULIN LISPRO 100 U/ML
INJECTION, SOLUTION INTRAVENOUS; SUBCUTANEOUS
Refills: 0 | Status: DISCONTINUED | OUTPATIENT
Start: 2025-01-01 | End: 2025-01-01

## 2025-01-01 RX ORDER — ATORVASTATIN CALCIUM 80 MG/1
80 TABLET, FILM COATED ORAL AT BEDTIME
Refills: 0 | Status: DISCONTINUED | OUTPATIENT
Start: 2025-01-01 | End: 2025-01-01

## 2025-01-01 RX ORDER — NIFEDIPINE 30 MG
60 TABLET, EXTENDED RELEASE 24 HR ORAL DAILY
Refills: 0 | Status: DISCONTINUED | OUTPATIENT
Start: 2025-01-01 | End: 2025-01-01

## 2025-01-01 RX ORDER — ACETAMINOPHEN 500 MG/5ML
650 LIQUID (ML) ORAL ONCE
Refills: 0 | Status: DISCONTINUED | OUTPATIENT
Start: 2025-01-01 | End: 2025-01-01

## 2025-01-01 RX ORDER — LACOSAMIDE 150 MG/1
300 TABLET, FILM COATED ORAL ONCE
Refills: 0 | Status: DISCONTINUED | OUTPATIENT
Start: 2025-01-01 | End: 2025-01-01

## 2025-01-01 RX ORDER — CARVEDILOL 3.12 MG/1
6.25 TABLET, FILM COATED ORAL EVERY 12 HOURS
Refills: 0 | Status: DISCONTINUED | OUTPATIENT
Start: 2025-01-01 | End: 2025-01-01

## 2025-01-01 RX ORDER — HEPARIN SODIUM 1000 [USP'U]/ML
7000 INJECTION INTRAVENOUS; SUBCUTANEOUS EVERY 6 HOURS
Refills: 0 | Status: DISCONTINUED | OUTPATIENT
Start: 2025-01-01 | End: 2025-01-01

## 2025-01-01 RX ORDER — VANCOMYCIN HCL IN 5 % DEXTROSE 1.5G/250ML
1500 PLASTIC BAG, INJECTION (ML) INTRAVENOUS ONCE
Refills: 0 | Status: COMPLETED | OUTPATIENT
Start: 2025-01-01 | End: 2025-01-01

## 2025-01-01 RX ORDER — METOPROLOL SUCCINATE 50 MG/1
25 TABLET, EXTENDED RELEASE ORAL DAILY
Refills: 0 | Status: DISCONTINUED | OUTPATIENT
Start: 2025-01-01 | End: 2025-01-01

## 2025-01-01 RX ORDER — NITROGLYCERIN 20 MG/G
80 OINTMENT TOPICAL
Qty: 50 | Refills: 0 | Status: DISCONTINUED | OUTPATIENT
Start: 2025-01-01 | End: 2025-01-01

## 2025-01-01 RX ORDER — HYDROMORPHONE/SOD CHLOR,ISO/PF 2 MG/10 ML
1 SYRINGE (ML) INJECTION
Refills: 0 | Status: DISCONTINUED | OUTPATIENT
Start: 2025-01-01 | End: 2025-01-01

## 2025-01-01 RX ORDER — DEXTROSE 50 % IN WATER 50 %
15 SYRINGE (ML) INTRAVENOUS ONCE
Refills: 0 | Status: DISCONTINUED | OUTPATIENT
Start: 2025-01-01 | End: 2025-01-01

## 2025-01-01 RX ORDER — HEPARIN SODIUM 1000 [USP'U]/ML
8000 INJECTION INTRAVENOUS; SUBCUTANEOUS ONCE
Refills: 0 | Status: COMPLETED | OUTPATIENT
Start: 2025-01-01 | End: 2025-01-01

## 2025-01-01 RX ORDER — SODIUM CHLORIDE 9 G/1000ML
1000 INJECTION, SOLUTION INTRAVENOUS
Refills: 0 | Status: DISCONTINUED | OUTPATIENT
Start: 2025-01-01 | End: 2025-01-01

## 2025-01-01 RX ORDER — DEXTROSE 50 % IN WATER 50 %
50 SYRINGE (ML) INTRAVENOUS ONCE
Refills: 0 | Status: COMPLETED | OUTPATIENT
Start: 2025-01-01 | End: 2025-01-01

## 2025-01-01 RX ORDER — POLYETHYLENE GLYCOL 3350 17 G/17G
17 POWDER, FOR SOLUTION ORAL
Refills: 0 | Status: DISCONTINUED | OUTPATIENT
Start: 2025-01-01 | End: 2025-01-01

## 2025-01-01 RX ORDER — HYDROMORPHONE/SOD CHLOR,ISO/PF 2 MG/10 ML
1 SYRINGE (ML) INJECTION ONCE
Refills: 0 | Status: DISCONTINUED | OUTPATIENT
Start: 2025-01-01 | End: 2025-01-01

## 2025-01-01 RX ORDER — LISINOPRIL 5 MG/1
1 TABLET ORAL
Refills: 0 | DISCHARGE

## 2025-01-01 RX ORDER — NIFEDIPINE 30 MG
30 TABLET, EXTENDED RELEASE 24 HR ORAL ONCE
Refills: 0 | Status: COMPLETED | OUTPATIENT
Start: 2025-01-01 | End: 2025-01-01

## 2025-01-01 RX ORDER — SODIUM CHLORIDE 9 G/1000ML
300 INJECTION, SOLUTION INTRAVENOUS ONCE
Refills: 0 | Status: COMPLETED | OUTPATIENT
Start: 2025-01-01 | End: 2025-01-01

## 2025-01-01 RX ORDER — PROPOFOL 10 MG/ML
0.5 INJECTION, EMULSION INTRAVENOUS
Qty: 1000 | Refills: 0 | Status: DISCONTINUED | OUTPATIENT
Start: 2025-01-01 | End: 2025-01-01

## 2025-01-01 RX ORDER — GLYCOPYRROLATE 0.2 MG/ML
0.2 INJECTION INTRAMUSCULAR; INTRAVENOUS
Refills: 0 | Status: DISCONTINUED | OUTPATIENT
Start: 2025-01-01 | End: 2025-01-01

## 2025-01-01 RX ORDER — GLUCAGON 3 MG/1
1 POWDER NASAL ONCE
Refills: 0 | Status: DISCONTINUED | OUTPATIENT
Start: 2025-01-01 | End: 2025-01-01

## 2025-01-01 RX ORDER — FUROSEMIDE 10 MG/ML
40 INJECTION INTRAMUSCULAR; INTRAVENOUS ONCE
Refills: 0 | Status: DISCONTINUED | OUTPATIENT
Start: 2025-01-01 | End: 2025-01-01

## 2025-01-01 RX ORDER — HEPARIN SODIUM 1000 [USP'U]/ML
8000 INJECTION INTRAVENOUS; SUBCUTANEOUS EVERY 6 HOURS
Refills: 0 | Status: DISCONTINUED | OUTPATIENT
Start: 2025-01-01 | End: 2025-01-01

## 2025-01-01 RX ORDER — MAGNESIUM SULFATE 500 MG/ML
2 SYRINGE (ML) INJECTION ONCE
Refills: 0 | Status: COMPLETED | OUTPATIENT
Start: 2025-01-01 | End: 2025-01-01

## 2025-01-01 RX ORDER — ACETAMINOPHEN 500 MG/5ML
650 LIQUID (ML) ORAL ONCE
Refills: 0 | Status: COMPLETED | OUTPATIENT
Start: 2025-01-01 | End: 2025-01-01

## 2025-01-01 RX ORDER — METFORMIN HYDROCHLORIDE 850 MG/1
0 TABLET ORAL
Refills: 0 | DISCHARGE

## 2025-01-01 RX ORDER — NICARDIPINE HCL 30 MG
2.5 CAPSULE ORAL
Qty: 40 | Refills: 0 | Status: DISCONTINUED | OUTPATIENT
Start: 2025-01-01 | End: 2025-01-01

## 2025-01-01 RX ORDER — LACOSAMIDE 150 MG/1
150 TABLET, FILM COATED ORAL
Refills: 0 | Status: DISCONTINUED | OUTPATIENT
Start: 2025-01-01 | End: 2025-01-01

## 2025-01-01 RX ORDER — INSULIN LISPRO 100 U/ML
3 INJECTION, SOLUTION INTRAVENOUS; SUBCUTANEOUS
Refills: 0 | Status: DISCONTINUED | OUTPATIENT
Start: 2025-01-01 | End: 2025-01-01

## 2025-01-01 RX ORDER — NIFEDIPINE 30 MG
1 TABLET, EXTENDED RELEASE 24 HR ORAL
Qty: 30 | Refills: 3
Start: 2025-01-01 | End: 2025-09-30

## 2025-01-01 RX ORDER — AMLODIPINE BESYLATE 10 MG/1
10 TABLET ORAL DAILY
Refills: 0 | Status: DISCONTINUED | OUTPATIENT
Start: 2025-01-01 | End: 2025-01-01

## 2025-01-01 RX ORDER — LACTULOSE 10 G/15ML
10 SOLUTION ORAL THREE TIMES A DAY
Refills: 0 | Status: DISCONTINUED | OUTPATIENT
Start: 2025-01-01 | End: 2025-01-01

## 2025-01-01 RX ORDER — APIXABAN 2.5 MG/1
5 TABLET, FILM COATED ORAL EVERY 12 HOURS
Refills: 0 | Status: DISCONTINUED | OUTPATIENT
Start: 2025-01-01 | End: 2025-01-01

## 2025-01-01 RX ORDER — BUMETANIDE 1 MG/1
2 TABLET ORAL EVERY 12 HOURS
Refills: 0 | Status: DISCONTINUED | OUTPATIENT
Start: 2025-01-01 | End: 2025-01-01

## 2025-01-01 RX ORDER — MAGNESIUM, ALUMINUM HYDROXIDE 200-200 MG
30 TABLET,CHEWABLE ORAL EVERY 4 HOURS
Refills: 0 | Status: DISCONTINUED | OUTPATIENT
Start: 2025-01-01 | End: 2025-01-01

## 2025-01-01 RX ORDER — SENNA 187 MG
2 TABLET ORAL AT BEDTIME
Refills: 0 | Status: DISCONTINUED | OUTPATIENT
Start: 2025-01-01 | End: 2025-01-01

## 2025-01-01 RX ORDER — AMLODIPINE BESYLATE 10 MG/1
1 TABLET ORAL
Refills: 0 | DISCHARGE

## 2025-01-01 RX ORDER — HEPARIN SODIUM 1000 [USP'U]/ML
3500 INJECTION INTRAVENOUS; SUBCUTANEOUS EVERY 6 HOURS
Refills: 0 | Status: DISCONTINUED | OUTPATIENT
Start: 2025-01-01 | End: 2025-01-01

## 2025-01-01 RX ORDER — ASPIRIN 325 MG
81 TABLET ORAL DAILY
Refills: 0 | Status: DISCONTINUED | OUTPATIENT
Start: 2025-01-01 | End: 2025-01-01

## 2025-01-01 RX ORDER — HEPARIN SODIUM 1000 [USP'U]/ML
INJECTION INTRAVENOUS; SUBCUTANEOUS
Qty: 25000 | Refills: 0 | Status: DISCONTINUED | OUTPATIENT
Start: 2025-01-01 | End: 2025-01-01

## 2025-01-01 RX ORDER — LEVETIRACETAM 10 MG/ML
750 INJECTION, SOLUTION INTRAVENOUS EVERY 12 HOURS
Refills: 0 | Status: DISCONTINUED | OUTPATIENT
Start: 2025-01-01 | End: 2025-01-01

## 2025-01-01 RX ORDER — NALOXEGOL OXALATE 12.5 MG/1
12.5 TABLET, FILM COATED ORAL DAILY
Refills: 0 | Status: DISCONTINUED | OUTPATIENT
Start: 2025-01-01 | End: 2025-01-01

## 2025-01-01 RX ORDER — ASPIRIN 325 MG
324 TABLET ORAL ONCE
Refills: 0 | Status: COMPLETED | OUTPATIENT
Start: 2025-01-01 | End: 2025-01-01

## 2025-01-01 RX ORDER — INSULIN GLARGINE-YFGN 100 [IU]/ML
5 INJECTION, SOLUTION SUBCUTANEOUS AT BEDTIME
Refills: 0 | Status: DISCONTINUED | OUTPATIENT
Start: 2025-01-01 | End: 2025-01-01

## 2025-01-01 RX ORDER — METFORMIN HYDROCHLORIDE 850 MG/1
0 TABLET ORAL
Qty: 0 | Refills: 0 | DISCHARGE
Start: 2025-01-01

## 2025-01-01 RX ORDER — IBUTILIDE FUMARATE 0.1 MG/ML
1 INJECTION, SOLUTION INTRAVENOUS ONCE
Refills: 0 | Status: COMPLETED | OUTPATIENT
Start: 2025-01-01 | End: 2025-01-01

## 2025-01-01 RX ORDER — MIDAZOLAM IN 0.9 % SOD.CHLORID 1 MG/ML
2 PLASTIC BAG, INJECTION (ML) INTRAVENOUS
Refills: 0 | Status: DISCONTINUED | OUTPATIENT
Start: 2025-01-01 | End: 2025-01-01

## 2025-01-01 RX ORDER — ATORVASTATIN CALCIUM 80 MG/1
40 TABLET, FILM COATED ORAL AT BEDTIME
Refills: 0 | Status: DISCONTINUED | OUTPATIENT
Start: 2025-01-01 | End: 2025-01-01

## 2025-01-01 RX ORDER — LEVETIRACETAM 10 MG/ML
1500 INJECTION, SOLUTION INTRAVENOUS EVERY 12 HOURS
Refills: 0 | Status: DISCONTINUED | OUTPATIENT
Start: 2025-01-01 | End: 2025-01-01

## 2025-01-01 RX ORDER — FUROSEMIDE 10 MG/ML
60 INJECTION INTRAMUSCULAR; INTRAVENOUS EVERY 12 HOURS
Refills: 0 | Status: DISCONTINUED | OUTPATIENT
Start: 2025-01-01 | End: 2025-01-01

## 2025-01-01 RX ORDER — HEPARIN SODIUM 1000 [USP'U]/ML
4000 INJECTION INTRAVENOUS; SUBCUTANEOUS EVERY 6 HOURS
Refills: 0 | Status: DISCONTINUED | OUTPATIENT
Start: 2025-01-01 | End: 2025-01-01

## 2025-01-01 RX ORDER — NICARDIPINE HCL 30 MG
5 CAPSULE ORAL
Qty: 40 | Refills: 0 | Status: DISCONTINUED | OUTPATIENT
Start: 2025-01-01 | End: 2025-01-01

## 2025-01-01 RX ORDER — INSULIN GLARGINE-YFGN 100 [IU]/ML
9 INJECTION, SOLUTION SUBCUTANEOUS AT BEDTIME
Refills: 0 | Status: DISCONTINUED | OUTPATIENT
Start: 2025-01-01 | End: 2025-01-01

## 2025-01-01 RX ORDER — APIXABAN 2.5 MG/1
1 TABLET, FILM COATED ORAL
Qty: 60 | Refills: 3
Start: 2025-01-01 | End: 2025-09-30

## 2025-01-01 RX ORDER — CARVEDILOL 3.12 MG/1
1 TABLET, FILM COATED ORAL
Qty: 60 | Refills: 3
Start: 2025-01-01 | End: 2025-09-30

## 2025-01-01 RX ORDER — NITROGLYCERIN 20 MG/G
0.4 OINTMENT TOPICAL ONCE
Refills: 0 | Status: COMPLETED | OUTPATIENT
Start: 2025-01-01 | End: 2025-01-01

## 2025-01-01 RX ORDER — METOPROLOL SUCCINATE 50 MG/1
1 TABLET, EXTENDED RELEASE ORAL
Refills: 0 | DISCHARGE

## 2025-01-01 RX ORDER — SODIUM ZIRCONIUM CYCLOSILICATE 5 G/5G
10 POWDER, FOR SUSPENSION ORAL EVERY 8 HOURS
Refills: 0 | Status: COMPLETED | OUTPATIENT
Start: 2025-01-01 | End: 2025-01-01

## 2025-01-01 RX ORDER — MAGNESIUM SULFATE 500 MG/ML
2 SYRINGE (ML) INJECTION
Refills: 0 | Status: DISCONTINUED | OUTPATIENT
Start: 2025-01-01 | End: 2025-01-01

## 2025-01-01 RX ORDER — PIPERACILLIN-TAZO-DEXTROSE,ISO 3.375G/5
3.38 IV SOLUTION, PIGGYBACK PREMIX FROZEN(ML) INTRAVENOUS EVERY 8 HOURS
Refills: 0 | Status: DISCONTINUED | OUTPATIENT
Start: 2025-01-01 | End: 2025-01-01

## 2025-01-01 RX ORDER — SODIUM ZIRCONIUM CYCLOSILICATE 5 G/5G
10 POWDER, FOR SUSPENSION ORAL THREE TIMES A DAY
Refills: 0 | Status: COMPLETED | OUTPATIENT
Start: 2025-01-01 | End: 2025-01-01

## 2025-01-01 RX ORDER — IBUTILIDE FUMARATE 0.1 MG/ML
1 INJECTION, SOLUTION INTRAVENOUS ONCE
Refills: 0 | Status: DISCONTINUED | OUTPATIENT
Start: 2025-01-01 | End: 2025-01-01

## 2025-01-01 RX ORDER — ACETAMINOPHEN 500 MG/5ML
650 LIQUID (ML) ORAL EVERY 6 HOURS
Refills: 0 | Status: DISCONTINUED | OUTPATIENT
Start: 2025-01-01 | End: 2025-01-01

## 2025-01-01 RX ORDER — FUROSEMIDE 10 MG/ML
40 INJECTION INTRAMUSCULAR; INTRAVENOUS ONCE
Refills: 0 | Status: COMPLETED | OUTPATIENT
Start: 2025-01-01 | End: 2025-01-01

## 2025-01-01 RX ORDER — LEVETIRACETAM 10 MG/ML
750 INJECTION, SOLUTION INTRAVENOUS ONCE
Refills: 0 | Status: DISCONTINUED | OUTPATIENT
Start: 2025-01-01 | End: 2025-01-01

## 2025-01-01 RX ORDER — INSULIN LISPRO 100 U/ML
INJECTION, SOLUTION INTRAVENOUS; SUBCUTANEOUS EVERY 6 HOURS
Refills: 0 | Status: DISCONTINUED | OUTPATIENT
Start: 2025-01-01 | End: 2025-01-01

## 2025-01-01 RX ORDER — IPRATROPIUM BROMIDE AND ALBUTEROL SULFATE .5; 2.5 MG/3ML; MG/3ML
3 SOLUTION RESPIRATORY (INHALATION) EVERY 6 HOURS
Refills: 0 | Status: DISCONTINUED | OUTPATIENT
Start: 2025-01-01 | End: 2025-01-01

## 2025-01-01 RX ORDER — ISOSORBDIE DINITRATE 30 MG/1
10 TABLET ORAL THREE TIMES A DAY
Refills: 0 | Status: DISCONTINUED | OUTPATIENT
Start: 2025-01-01 | End: 2025-01-01

## 2025-01-01 RX ORDER — ATORVASTATIN CALCIUM 80 MG/1
1 TABLET, FILM COATED ORAL
Qty: 30 | Refills: 3
Start: 2025-01-01 | End: 2025-09-30

## 2025-01-01 RX ORDER — MELATONIN 5 MG
3 TABLET ORAL AT BEDTIME
Refills: 0 | Status: DISCONTINUED | OUTPATIENT
Start: 2025-01-01 | End: 2025-01-01

## 2025-01-01 RX ORDER — FENTANYL CITRATE-0.9 % NACL/PF 100MCG/2ML
50 SYRINGE (ML) INTRAVENOUS ONCE
Refills: 0 | Status: DISCONTINUED | OUTPATIENT
Start: 2025-01-01 | End: 2025-01-01

## 2025-01-01 RX ORDER — LORAZEPAM 4 MG/ML
4 VIAL (ML) INJECTION ONCE
Refills: 0 | Status: DISCONTINUED | OUTPATIENT
Start: 2025-01-01 | End: 2025-01-01

## 2025-01-01 RX ORDER — MIDAZOLAM IN 0.9 % SOD.CHLORID 1 MG/ML
2 PLASTIC BAG, INJECTION (ML) INTRAVENOUS ONCE
Refills: 0 | Status: DISCONTINUED | OUTPATIENT
Start: 2025-01-01 | End: 2025-01-01

## 2025-01-01 RX ORDER — FENTANYL CITRATE-0.9 % NACL/PF 100MCG/2ML
0.46 SYRINGE (ML) INTRAVENOUS
Qty: 2500 | Refills: 0 | Status: DISCONTINUED | OUTPATIENT
Start: 2025-01-01 | End: 2025-01-01

## 2025-01-01 RX ORDER — SODIUM CHLORIDE 9 G/1000ML
500 INJECTION, SOLUTION INTRAVENOUS
Refills: 0 | Status: DISCONTINUED | OUTPATIENT
Start: 2025-01-01 | End: 2025-01-01

## 2025-01-01 RX ORDER — LORAZEPAM 4 MG/ML
2 VIAL (ML) INJECTION ONCE
Refills: 0 | Status: DISCONTINUED | OUTPATIENT
Start: 2025-01-01 | End: 2025-01-01

## 2025-01-01 RX ORDER — ATORVASTATIN CALCIUM 80 MG/1
1 TABLET, FILM COATED ORAL
Refills: 0 | DISCHARGE

## 2025-01-01 RX ORDER — DEXTROSE 50 % IN WATER 50 %
12.5 SYRINGE (ML) INTRAVENOUS ONCE
Refills: 0 | Status: DISCONTINUED | OUTPATIENT
Start: 2025-01-01 | End: 2025-01-01

## 2025-01-01 RX ADMIN — HEPARIN SODIUM 0 UNIT(S)/HR: 1000 INJECTION INTRAVENOUS; SUBCUTANEOUS at 08:43

## 2025-01-01 RX ADMIN — GLYCOPYRROLATE 0.2 MILLIGRAM(S): 0.2 INJECTION INTRAMUSCULAR; INTRAVENOUS at 15:41

## 2025-01-01 RX ADMIN — SODIUM ZIRCONIUM CYCLOSILICATE 10 GRAM(S): 5 POWDER, FOR SUSPENSION ORAL at 13:45

## 2025-01-01 RX ADMIN — Medication 25 GRAM(S): at 13:45

## 2025-01-01 RX ADMIN — Medication 650 MILLIGRAM(S): at 11:40

## 2025-01-01 RX ADMIN — Medication 81 MILLIGRAM(S): at 11:14

## 2025-01-01 RX ADMIN — ATORVASTATIN CALCIUM 80 MILLIGRAM(S): 80 TABLET, FILM COATED ORAL at 21:44

## 2025-01-01 RX ADMIN — Medication 25 GRAM(S): at 13:27

## 2025-01-01 RX ADMIN — Medication 2 TABLET(S): at 22:47

## 2025-01-01 RX ADMIN — HEPARIN SODIUM 5000 UNIT(S): 1000 INJECTION INTRAVENOUS; SUBCUTANEOUS at 05:45

## 2025-01-01 RX ADMIN — Medication 25 GRAM(S): at 13:10

## 2025-01-01 RX ADMIN — INSULIN LISPRO 2: 100 INJECTION, SOLUTION INTRAVENOUS; SUBCUTANEOUS at 05:24

## 2025-01-01 RX ADMIN — Medication 25 GRAM(S): at 10:26

## 2025-01-01 RX ADMIN — IPRATROPIUM BROMIDE AND ALBUTEROL SULFATE 3 MILLILITER(S): .5; 2.5 SOLUTION RESPIRATORY (INHALATION) at 18:50

## 2025-01-01 RX ADMIN — Medication 2 TABLET(S): at 21:59

## 2025-01-01 RX ADMIN — INSULIN LISPRO 3 UNIT(S): 100 INJECTION, SOLUTION INTRAVENOUS; SUBCUTANEOUS at 08:37

## 2025-01-01 RX ADMIN — HEPARIN SODIUM 7000 UNIT(S): 1000 INJECTION INTRAVENOUS; SUBCUTANEOUS at 19:32

## 2025-01-01 RX ADMIN — IPRATROPIUM BROMIDE AND ALBUTEROL SULFATE 3 MILLILITER(S): .5; 2.5 SOLUTION RESPIRATORY (INHALATION) at 01:04

## 2025-01-01 RX ADMIN — Medication 2 TABLET(S): at 21:38

## 2025-01-01 RX ADMIN — INSULIN LISPRO 1: 100 INJECTION, SOLUTION INTRAVENOUS; SUBCUTANEOUS at 06:30

## 2025-01-01 RX ADMIN — Medication 25 GRAM(S): at 22:11

## 2025-01-01 RX ADMIN — INSULIN LISPRO 1: 100 INJECTION, SOLUTION INTRAVENOUS; SUBCUTANEOUS at 18:02

## 2025-01-01 RX ADMIN — Medication 4.38 MICROGRAM(S)/KG/HR: at 05:53

## 2025-01-01 RX ADMIN — POLYETHYLENE GLYCOL 3350 17 GRAM(S): 17 POWDER, FOR SOLUTION ORAL at 05:07

## 2025-01-01 RX ADMIN — Medication 12.5 MG/HR: at 17:10

## 2025-01-01 RX ADMIN — PROPOFOL 0.26 MICROGRAM(S)/KG/MIN: 10 INJECTION, EMULSION INTRAVENOUS at 08:55

## 2025-01-01 RX ADMIN — ATORVASTATIN CALCIUM 80 MILLIGRAM(S): 80 TABLET, FILM COATED ORAL at 21:59

## 2025-01-01 RX ADMIN — LACOSAMIDE 150 MILLIGRAM(S): 150 TABLET, FILM COATED ORAL at 05:16

## 2025-01-01 RX ADMIN — Medication 200 GRAM(S): at 02:05

## 2025-01-01 RX ADMIN — INSULIN LISPRO 6: 100 INJECTION, SOLUTION INTRAVENOUS; SUBCUTANEOUS at 08:38

## 2025-01-01 RX ADMIN — Medication 25 GRAM(S): at 05:01

## 2025-01-01 RX ADMIN — SODIUM ZIRCONIUM CYCLOSILICATE 10 GRAM(S): 5 POWDER, FOR SUSPENSION ORAL at 21:59

## 2025-01-01 RX ADMIN — NITROGLYCERIN 24 MICROGRAM(S)/MIN: 20 OINTMENT TOPICAL at 12:43

## 2025-01-01 RX ADMIN — Medication 650 MILLIGRAM(S): at 23:47

## 2025-01-01 RX ADMIN — IPRATROPIUM BROMIDE AND ALBUTEROL SULFATE 3 MILLILITER(S): .5; 2.5 SOLUTION RESPIRATORY (INHALATION) at 02:33

## 2025-01-01 RX ADMIN — ISOSORBDIE DINITRATE 10 MILLIGRAM(S): 30 TABLET ORAL at 06:25

## 2025-01-01 RX ADMIN — Medication 15 MILLILITER(S): at 05:00

## 2025-01-01 RX ADMIN — CARVEDILOL 6.25 MILLIGRAM(S): 3.12 TABLET, FILM COATED ORAL at 17:34

## 2025-01-01 RX ADMIN — Medication 25 GRAM(S): at 13:08

## 2025-01-01 RX ADMIN — HEPARIN SODIUM 1400 UNIT(S)/HR: 1000 INJECTION INTRAVENOUS; SUBCUTANEOUS at 00:57

## 2025-01-01 RX ADMIN — ISOSORBDIE DINITRATE 10 MILLIGRAM(S): 30 TABLET ORAL at 02:38

## 2025-01-01 RX ADMIN — INSULIN LISPRO 3 UNIT(S): 100 INJECTION, SOLUTION INTRAVENOUS; SUBCUTANEOUS at 17:18

## 2025-01-01 RX ADMIN — Medication 2 TABLET(S): at 21:16

## 2025-01-01 RX ADMIN — HEPARIN SODIUM 1600 UNIT(S)/HR: 1000 INJECTION INTRAVENOUS; SUBCUTANEOUS at 17:35

## 2025-01-01 RX ADMIN — INSULIN LISPRO 2: 100 INJECTION, SOLUTION INTRAVENOUS; SUBCUTANEOUS at 11:12

## 2025-01-01 RX ADMIN — METOPROLOL SUCCINATE 25 MILLIGRAM(S): 50 TABLET, EXTENDED RELEASE ORAL at 05:46

## 2025-01-01 RX ADMIN — FUROSEMIDE 60 MILLIGRAM(S): 10 INJECTION INTRAMUSCULAR; INTRAVENOUS at 05:45

## 2025-01-01 RX ADMIN — Medication 81 MILLIGRAM(S): at 11:12

## 2025-01-01 RX ADMIN — HEPARIN SODIUM 900 UNIT(S)/HR: 1000 INJECTION INTRAVENOUS; SUBCUTANEOUS at 22:49

## 2025-01-01 RX ADMIN — Medication 300 MILLIGRAM(S): at 04:35

## 2025-01-01 RX ADMIN — CARVEDILOL 6.25 MILLIGRAM(S): 3.12 TABLET, FILM COATED ORAL at 06:25

## 2025-01-01 RX ADMIN — INSULIN GLARGINE-YFGN 5 UNIT(S): 100 INJECTION, SOLUTION SUBCUTANEOUS at 23:46

## 2025-01-01 RX ADMIN — SODIUM ZIRCONIUM CYCLOSILICATE 10 GRAM(S): 5 POWDER, FOR SUSPENSION ORAL at 13:10

## 2025-01-01 RX ADMIN — FUROSEMIDE 40 MILLIGRAM(S): 10 INJECTION INTRAMUSCULAR; INTRAVENOUS at 02:47

## 2025-01-01 RX ADMIN — Medication 650 MILLIGRAM(S): at 23:45

## 2025-01-01 RX ADMIN — AMLODIPINE BESYLATE 10 MILLIGRAM(S): 10 TABLET ORAL at 05:45

## 2025-01-01 RX ADMIN — Medication 100 MILLIGRAM(S): at 06:25

## 2025-01-01 RX ADMIN — Medication 2 MILLIGRAM(S): at 19:32

## 2025-01-01 RX ADMIN — HEPARIN SODIUM 1100 UNIT(S)/HR: 1000 INJECTION INTRAVENOUS; SUBCUTANEOUS at 01:07

## 2025-01-01 RX ADMIN — Medication 25 GRAM(S): at 17:35

## 2025-01-01 RX ADMIN — HEPARIN SODIUM 1500 UNIT(S)/HR: 1000 INJECTION INTRAVENOUS; SUBCUTANEOUS at 18:12

## 2025-01-01 RX ADMIN — Medication 81 MILLIGRAM(S): at 12:40

## 2025-01-01 RX ADMIN — LEVETIRACETAM 1500 MILLIGRAM(S): 10 INJECTION, SOLUTION INTRAVENOUS at 05:01

## 2025-01-01 RX ADMIN — Medication 1 APPLICATION(S): at 06:08

## 2025-01-01 RX ADMIN — Medication 15 MILLILITER(S): at 18:01

## 2025-01-01 RX ADMIN — Medication 100 MILLIGRAM(S): at 21:43

## 2025-01-01 RX ADMIN — POLYETHYLENE GLYCOL 3350 17 GRAM(S): 17 POWDER, FOR SOLUTION ORAL at 21:24

## 2025-01-01 RX ADMIN — PROPOFOL 0.26 MICROGRAM(S)/KG/MIN: 10 INJECTION, EMULSION INTRAVENOUS at 06:18

## 2025-01-01 RX ADMIN — Medication 81 MILLIGRAM(S): at 11:46

## 2025-01-01 RX ADMIN — HEPARIN SODIUM 0 UNIT(S)/HR: 1000 INJECTION INTRAVENOUS; SUBCUTANEOUS at 15:26

## 2025-01-01 RX ADMIN — Medication 50 MICROGRAM(S): at 01:15

## 2025-01-01 RX ADMIN — Medication 1 MILLIGRAM(S): at 15:45

## 2025-01-01 RX ADMIN — SODIUM ZIRCONIUM CYCLOSILICATE 10 GRAM(S): 5 POWDER, FOR SUSPENSION ORAL at 13:56

## 2025-01-01 RX ADMIN — Medication 1 MILLIGRAM(S): at 15:33

## 2025-01-01 RX ADMIN — Medication 2 MILLIGRAM(S): at 09:00

## 2025-01-01 RX ADMIN — IPRATROPIUM BROMIDE AND ALBUTEROL SULFATE 3 MILLILITER(S): .5; 2.5 SOLUTION RESPIRATORY (INHALATION) at 07:25

## 2025-01-01 RX ADMIN — Medication 15 MILLILITER(S): at 05:01

## 2025-01-01 RX ADMIN — Medication 25 GRAM(S): at 05:07

## 2025-01-01 RX ADMIN — Medication 650 MILLIGRAM(S): at 11:12

## 2025-01-01 RX ADMIN — Medication 81 MILLIGRAM(S): at 11:37

## 2025-01-01 RX ADMIN — Medication 30 MILLIGRAM(S): at 21:43

## 2025-01-01 RX ADMIN — IPRATROPIUM BROMIDE AND ALBUTEROL SULFATE 3 MILLILITER(S): .5; 2.5 SOLUTION RESPIRATORY (INHALATION) at 07:53

## 2025-01-01 RX ADMIN — HEPARIN SODIUM 0 UNIT(S)/HR: 1000 INJECTION INTRAVENOUS; SUBCUTANEOUS at 05:48

## 2025-01-01 RX ADMIN — Medication 4 MILLIGRAM(S): at 08:05

## 2025-01-01 RX ADMIN — Medication 100 MILLIGRAM(S): at 14:09

## 2025-01-01 RX ADMIN — PROPOFOL 0.26 MICROGRAM(S)/KG/MIN: 10 INJECTION, EMULSION INTRAVENOUS at 09:07

## 2025-01-01 RX ADMIN — BUMETANIDE 2 MILLIGRAM(S): 1 TABLET ORAL at 06:32

## 2025-01-01 RX ADMIN — Medication 4 MICROGRAM(S)/KG/HR: at 02:01

## 2025-01-01 RX ADMIN — NITROGLYCERIN 24 MICROGRAM(S)/MIN: 20 OINTMENT TOPICAL at 02:09

## 2025-01-01 RX ADMIN — INSULIN LISPRO 3 UNIT(S): 100 INJECTION, SOLUTION INTRAVENOUS; SUBCUTANEOUS at 08:39

## 2025-01-01 RX ADMIN — Medication 25 GRAM(S): at 21:38

## 2025-01-01 RX ADMIN — BUMETANIDE 2 MILLIGRAM(S): 1 TABLET ORAL at 17:37

## 2025-01-01 RX ADMIN — Medication 100 MILLIGRAM(S): at 22:47

## 2025-01-01 RX ADMIN — Medication 3 MILLIGRAM(S): at 22:48

## 2025-01-01 RX ADMIN — Medication 81 MILLIGRAM(S): at 12:45

## 2025-01-01 RX ADMIN — Medication 50 MILLIEQUIVALENT(S): at 17:37

## 2025-01-01 RX ADMIN — PROPOFOL 0.26 MICROGRAM(S)/KG/MIN: 10 INJECTION, EMULSION INTRAVENOUS at 17:31

## 2025-01-01 RX ADMIN — SODIUM ZIRCONIUM CYCLOSILICATE 10 GRAM(S): 5 POWDER, FOR SUSPENSION ORAL at 05:01

## 2025-01-01 RX ADMIN — LEVETIRACETAM 750 MILLIGRAM(S): 10 INJECTION, SOLUTION INTRAVENOUS at 17:30

## 2025-01-01 RX ADMIN — INSULIN GLARGINE-YFGN 9 UNIT(S): 100 INJECTION, SOLUTION SUBCUTANEOUS at 22:42

## 2025-01-01 RX ADMIN — IPRATROPIUM BROMIDE AND ALBUTEROL SULFATE 3 MILLILITER(S): .5; 2.5 SOLUTION RESPIRATORY (INHALATION) at 11:52

## 2025-01-01 RX ADMIN — NITROGLYCERIN 0.4 MILLIGRAM(S): 20 OINTMENT TOPICAL at 01:50

## 2025-01-01 RX ADMIN — Medication 1 APPLICATION(S): at 05:00

## 2025-01-01 RX ADMIN — IPRATROPIUM BROMIDE AND ALBUTEROL SULFATE 3 MILLILITER(S): .5; 2.5 SOLUTION RESPIRATORY (INHALATION) at 19:29

## 2025-01-01 RX ADMIN — LEVETIRACETAM 1500 MILLIGRAM(S): 10 INJECTION, SOLUTION INTRAVENOUS at 05:00

## 2025-01-01 RX ADMIN — Medication 15 MILLILITER(S): at 17:30

## 2025-01-01 RX ADMIN — HEPARIN SODIUM 1600 UNIT(S)/HR: 1000 INJECTION INTRAVENOUS; SUBCUTANEOUS at 06:09

## 2025-01-01 RX ADMIN — Medication 4.38 MICROGRAM(S)/KG/HR: at 17:31

## 2025-01-01 RX ADMIN — Medication 60 MILLIGRAM(S): at 06:25

## 2025-01-01 RX ADMIN — SODIUM ZIRCONIUM CYCLOSILICATE 10 GRAM(S): 5 POWDER, FOR SUSPENSION ORAL at 21:16

## 2025-01-01 RX ADMIN — Medication 12.5 MG/HR: at 15:54

## 2025-01-01 RX ADMIN — HEPARIN SODIUM 900 UNIT(S)/HR: 1000 INJECTION INTRAVENOUS; SUBCUTANEOUS at 09:06

## 2025-01-01 RX ADMIN — APIXABAN 5 MILLIGRAM(S): 2.5 TABLET, FILM COATED ORAL at 17:20

## 2025-01-01 RX ADMIN — Medication 4.38 MICROGRAM(S)/KG/HR: at 09:07

## 2025-01-01 RX ADMIN — CARVEDILOL 6.25 MILLIGRAM(S): 3.12 TABLET, FILM COATED ORAL at 11:39

## 2025-01-01 RX ADMIN — HEPARIN SODIUM 1900 UNIT(S)/HR: 1000 INJECTION INTRAVENOUS; SUBCUTANEOUS at 01:12

## 2025-01-01 RX ADMIN — Medication 81 MILLIGRAM(S): at 13:17

## 2025-01-01 RX ADMIN — PROPOFOL 0.26 MICROGRAM(S)/KG/MIN: 10 INJECTION, EMULSION INTRAVENOUS at 05:53

## 2025-01-01 RX ADMIN — Medication 15 MILLILITER(S): at 05:07

## 2025-01-01 RX ADMIN — INSULIN LISPRO 2: 100 INJECTION, SOLUTION INTRAVENOUS; SUBCUTANEOUS at 18:44

## 2025-01-01 RX ADMIN — NALOXEGOL OXALATE 12.5 MILLIGRAM(S): 12.5 TABLET, FILM COATED ORAL at 12:40

## 2025-01-01 RX ADMIN — Medication 2 TABLET(S): at 21:24

## 2025-01-01 RX ADMIN — INSULIN LISPRO 3 UNIT(S): 100 INJECTION, SOLUTION INTRAVENOUS; SUBCUTANEOUS at 11:38

## 2025-01-01 RX ADMIN — HEPARIN SODIUM 0 UNIT(S)/HR: 1000 INJECTION INTRAVENOUS; SUBCUTANEOUS at 17:09

## 2025-01-01 RX ADMIN — POLYETHYLENE GLYCOL 3350 17 GRAM(S): 17 POWDER, FOR SOLUTION ORAL at 06:25

## 2025-01-01 RX ADMIN — Medication 15 MILLILITER(S): at 06:08

## 2025-01-01 RX ADMIN — HEPARIN SODIUM 1800 UNIT(S)/HR: 1000 INJECTION INTRAVENOUS; SUBCUTANEOUS at 09:46

## 2025-01-01 RX ADMIN — IPRATROPIUM BROMIDE AND ALBUTEROL SULFATE 3 MILLILITER(S): .5; 2.5 SOLUTION RESPIRATORY (INHALATION) at 19:59

## 2025-01-01 RX ADMIN — SODIUM ZIRCONIUM CYCLOSILICATE 10 GRAM(S): 5 POWDER, FOR SUSPENSION ORAL at 21:44

## 2025-01-01 RX ADMIN — PROPOFOL 0.26 MICROGRAM(S)/KG/MIN: 10 INJECTION, EMULSION INTRAVENOUS at 11:12

## 2025-01-01 RX ADMIN — HEPARIN SODIUM 0 UNIT(S)/HR: 1000 INJECTION INTRAVENOUS; SUBCUTANEOUS at 00:03

## 2025-01-01 RX ADMIN — Medication 100 MILLIGRAM(S): at 09:08

## 2025-01-01 RX ADMIN — SODIUM ZIRCONIUM CYCLOSILICATE 10 GRAM(S): 5 POWDER, FOR SUSPENSION ORAL at 21:24

## 2025-01-01 RX ADMIN — PROPOFOL 0.26 MICROGRAM(S)/KG/MIN: 10 INJECTION, EMULSION INTRAVENOUS at 22:36

## 2025-01-01 RX ADMIN — HEPARIN SODIUM 1400 UNIT(S)/HR: 1000 INJECTION INTRAVENOUS; SUBCUTANEOUS at 16:28

## 2025-01-01 RX ADMIN — ISOSORBDIE DINITRATE 10 MILLIGRAM(S): 30 TABLET ORAL at 17:18

## 2025-01-01 RX ADMIN — Medication 40 MILLIEQUIVALENT(S): at 17:39

## 2025-01-01 RX ADMIN — SODIUM ZIRCONIUM CYCLOSILICATE 10 GRAM(S): 5 POWDER, FOR SUSPENSION ORAL at 11:00

## 2025-01-01 RX ADMIN — PROPOFOL 10.5 MICROGRAM(S)/KG/MIN: 10 INJECTION, EMULSION INTRAVENOUS at 09:56

## 2025-01-01 RX ADMIN — Medication 4.38 MICROGRAM(S)/KG/HR: at 08:56

## 2025-01-01 RX ADMIN — NALOXEGOL OXALATE 12.5 MILLIGRAM(S): 12.5 TABLET, FILM COATED ORAL at 18:04

## 2025-01-01 RX ADMIN — INSULIN LISPRO 3 UNIT(S): 100 INJECTION, SOLUTION INTRAVENOUS; SUBCUTANEOUS at 17:32

## 2025-01-01 RX ADMIN — Medication 5 UNIT(S): at 11:12

## 2025-01-01 RX ADMIN — Medication 650 MILLIGRAM(S): at 23:32

## 2025-01-01 RX ADMIN — CARVEDILOL 6.25 MILLIGRAM(S): 3.12 TABLET, FILM COATED ORAL at 17:20

## 2025-01-01 RX ADMIN — HEPARIN SODIUM 1700 UNIT(S)/HR: 1000 INJECTION INTRAVENOUS; SUBCUTANEOUS at 06:47

## 2025-01-01 RX ADMIN — HEPARIN SODIUM 1600 UNIT(S)/HR: 1000 INJECTION INTRAVENOUS; SUBCUTANEOUS at 10:20

## 2025-01-01 RX ADMIN — Medication 25 GRAM(S): at 21:24

## 2025-01-01 RX ADMIN — INSULIN LISPRO 4: 100 INJECTION, SOLUTION INTRAVENOUS; SUBCUTANEOUS at 11:38

## 2025-01-01 RX ADMIN — POLYETHYLENE GLYCOL 3350 17 GRAM(S): 17 POWDER, FOR SOLUTION ORAL at 18:01

## 2025-01-01 RX ADMIN — Medication 60 MILLIGRAM(S): at 06:33

## 2025-01-01 RX ADMIN — LEVETIRACETAM 750 MILLIGRAM(S): 10 INJECTION, SOLUTION INTRAVENOUS at 17:16

## 2025-01-01 RX ADMIN — LEVETIRACETAM 750 MILLIGRAM(S): 10 INJECTION, SOLUTION INTRAVENOUS at 18:56

## 2025-01-01 RX ADMIN — LEVETIRACETAM 1500 MILLIGRAM(S): 10 INJECTION, SOLUTION INTRAVENOUS at 18:01

## 2025-01-01 RX ADMIN — LACTULOSE 10 GRAM(S): 10 SOLUTION ORAL at 10:23

## 2025-01-01 RX ADMIN — INSULIN LISPRO 3: 100 INJECTION, SOLUTION INTRAVENOUS; SUBCUTANEOUS at 00:16

## 2025-01-01 RX ADMIN — NITROGLYCERIN 24 MICROGRAM(S)/MIN: 20 OINTMENT TOPICAL at 17:10

## 2025-01-01 RX ADMIN — ATORVASTATIN CALCIUM 80 MILLIGRAM(S): 80 TABLET, FILM COATED ORAL at 21:24

## 2025-01-01 RX ADMIN — ATORVASTATIN CALCIUM 80 MILLIGRAM(S): 80 TABLET, FILM COATED ORAL at 22:48

## 2025-01-01 RX ADMIN — INSULIN LISPRO 4: 100 INJECTION, SOLUTION INTRAVENOUS; SUBCUTANEOUS at 06:30

## 2025-01-01 RX ADMIN — ISOSORBDIE DINITRATE 10 MILLIGRAM(S): 30 TABLET ORAL at 16:18

## 2025-01-01 RX ADMIN — LACOSAMIDE 150 MILLIGRAM(S): 150 TABLET, FILM COATED ORAL at 18:00

## 2025-01-01 RX ADMIN — SODIUM CHLORIDE 300 MILLILITER(S): 9 INJECTION, SOLUTION INTRAVENOUS at 15:26

## 2025-01-01 RX ADMIN — Medication 40 MILLIEQUIVALENT(S): at 18:45

## 2025-01-01 RX ADMIN — INSULIN LISPRO 0: 100 INJECTION, SOLUTION INTRAVENOUS; SUBCUTANEOUS at 11:45

## 2025-01-01 RX ADMIN — Medication 15 MILLILITER(S): at 17:16

## 2025-01-01 RX ADMIN — SODIUM ZIRCONIUM CYCLOSILICATE 10 GRAM(S): 5 POWDER, FOR SUSPENSION ORAL at 13:08

## 2025-01-01 RX ADMIN — Medication 4.38 MICROGRAM(S)/KG/HR: at 11:12

## 2025-01-01 RX ADMIN — Medication 2 MILLIGRAM(S): at 15:41

## 2025-01-01 RX ADMIN — Medication 1 APPLICATION(S): at 17:39

## 2025-01-01 RX ADMIN — Medication 1 APPLICATION(S): at 05:01

## 2025-01-01 RX ADMIN — PROPOFOL 0.26 MICROGRAM(S)/KG/MIN: 10 INJECTION, EMULSION INTRAVENOUS at 22:23

## 2025-01-01 RX ADMIN — Medication 100 MILLIGRAM(S): at 13:56

## 2025-01-01 RX ADMIN — Medication 30 MILLIGRAM(S): at 11:01

## 2025-01-01 RX ADMIN — IPRATROPIUM BROMIDE AND ALBUTEROL SULFATE 3 MILLILITER(S): .5; 2.5 SOLUTION RESPIRATORY (INHALATION) at 13:04

## 2025-01-01 RX ADMIN — INSULIN LISPRO 3 UNIT(S): 100 INJECTION, SOLUTION INTRAVENOUS; SUBCUTANEOUS at 13:17

## 2025-01-01 RX ADMIN — Medication 50 MILLILITER(S): at 17:23

## 2025-01-01 RX ADMIN — Medication 25 GRAM(S): at 05:00

## 2025-01-01 RX ADMIN — Medication 324 MILLIGRAM(S): at 02:18

## 2025-01-01 RX ADMIN — ATORVASTATIN CALCIUM 80 MILLIGRAM(S): 80 TABLET, FILM COATED ORAL at 21:16

## 2025-01-01 RX ADMIN — HEPARIN SODIUM 2000 UNIT(S)/HR: 1000 INJECTION INTRAVENOUS; SUBCUTANEOUS at 19:24

## 2025-01-01 RX ADMIN — Medication 100 MILLIGRAM(S): at 06:33

## 2025-01-01 RX ADMIN — INSULIN LISPRO 2: 100 INJECTION, SOLUTION INTRAVENOUS; SUBCUTANEOUS at 17:33

## 2025-01-01 RX ADMIN — Medication 3 MILLIGRAM(S): at 23:46

## 2025-01-01 RX ADMIN — LACOSAMIDE 100 MILLIGRAM(S): 150 TABLET, FILM COATED ORAL at 11:13

## 2025-01-01 RX ADMIN — Medication 4.38 MICROGRAM(S)/KG/HR: at 22:23

## 2025-01-01 RX ADMIN — Medication 1 APPLICATION(S): at 05:08

## 2025-01-01 RX ADMIN — Medication 15 MILLILITER(S): at 17:24

## 2025-01-01 RX ADMIN — ISOSORBDIE DINITRATE 10 MILLIGRAM(S): 30 TABLET ORAL at 11:37

## 2025-01-01 RX ADMIN — LEVETIRACETAM 750 MILLIGRAM(S): 10 INJECTION, SOLUTION INTRAVENOUS at 05:01

## 2025-01-01 RX ADMIN — INSULIN LISPRO 2: 100 INJECTION, SOLUTION INTRAVENOUS; SUBCUTANEOUS at 08:38

## 2025-01-01 RX ADMIN — Medication 4.38 MICROGRAM(S)/KG/HR: at 20:44

## 2025-01-01 RX ADMIN — ATORVASTATIN CALCIUM 80 MILLIGRAM(S): 80 TABLET, FILM COATED ORAL at 21:38

## 2025-01-01 RX ADMIN — LEVETIRACETAM 1500 MILLIGRAM(S): 10 INJECTION, SOLUTION INTRAVENOUS at 17:24

## 2025-01-01 RX ADMIN — Medication 1 APPLICATION(S): at 06:47

## 2025-01-01 RX ADMIN — PROPOFOL 0.26 MICROGRAM(S)/KG/MIN: 10 INJECTION, EMULSION INTRAVENOUS at 04:13

## 2025-01-01 RX ADMIN — Medication 25 GRAM(S): at 21:15

## 2025-01-01 RX ADMIN — APIXABAN 5 MILLIGRAM(S): 2.5 TABLET, FILM COATED ORAL at 10:23

## 2025-01-01 RX ADMIN — Medication 25 GRAM(S): at 06:08

## 2025-01-01 RX ADMIN — INSULIN LISPRO 2: 100 INJECTION, SOLUTION INTRAVENOUS; SUBCUTANEOUS at 17:19

## 2025-01-01 RX ADMIN — HEPARIN SODIUM 8000 UNIT(S): 1000 INJECTION INTRAVENOUS; SUBCUTANEOUS at 09:46

## 2025-01-01 RX ADMIN — Medication 1 APPLICATION(S): at 06:25

## 2025-01-01 RX ADMIN — LEVETIRACETAM 1500 MILLIGRAM(S): 10 INJECTION, SOLUTION INTRAVENOUS at 05:07

## 2025-01-01 RX ADMIN — HEPARIN SODIUM 900 UNIT(S)/HR: 1000 INJECTION INTRAVENOUS; SUBCUTANEOUS at 16:11

## 2025-01-01 RX ADMIN — ISOSORBDIE DINITRATE 10 MILLIGRAM(S): 30 TABLET ORAL at 13:16

## 2025-01-01 RX ADMIN — IPRATROPIUM BROMIDE AND ALBUTEROL SULFATE 3 MILLILITER(S): .5; 2.5 SOLUTION RESPIRATORY (INHALATION) at 13:29

## 2025-01-01 RX ADMIN — Medication 4.38 MICROGRAM(S)/KG/HR: at 04:13

## 2025-01-01 RX ADMIN — SODIUM ZIRCONIUM CYCLOSILICATE 10 GRAM(S): 5 POWDER, FOR SUSPENSION ORAL at 05:02

## 2025-01-01 RX ADMIN — Medication 25 GRAM(S): at 13:15

## 2025-01-01 RX ADMIN — Medication 1000 MILLILITER(S): at 17:17

## 2025-06-01 NOTE — PROGRESS NOTE ADULT - ASSESSMENT
69yoM PMHx DM, HTN, HLD, previous CABG 2024 admitted for flash pulmonary edema and CHF.    #AHRF 2/2 flash pulmonary edema  #Hypertensive emergency   #Elevated troponin - r/o NSTEMI, suspect type 2  #Hx CABG 2024, 4x grafts  #HTN/HLD  - trop 21 >> 113 >> 99  - cards: Dr. 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

## 2025-06-01 NOTE — PATIENT PROFILE ADULT - FALL HARM RISK - HARM RISK INTERVENTIONS

## 2025-06-01 NOTE — ED PROVIDER NOTE - ATTENDING CONTRIBUTION TO CARE
Due to a high probability of clinically significant, life threatening deterioration, the patient required my highest level of preparedness to intervene emergently and I personally spent this critical care time directly and personally managing the patient. This critical care time included obtaining a history; examining the patient; pulse oximetry; ordering and review of studies; arranging urgent treatment with development of a management plan; evaluation of patient's response to treatment; frequent reassessment; and, discussions with other providers.  This critical care time was performed to assess and manage the high probability of imminent, life-threatening deterioration that could result in multi-organ failure. It was exclusive of separately billable procedures and treating other patients and teaching time.  Please see MDM section and the rest of the note for further information on patient assessment and treatment.

## 2025-06-01 NOTE — ED PROVIDER NOTE - EKG/XRAY ADDITIONAL INFORMATION
Biphasic T waves on V2 V3 suspicious for Wellens.  Chest x-ray shows increased vascular markings suspicious for pulmonary edema.

## 2025-06-01 NOTE — ED PROVIDER NOTE - CLINICAL SUMMARY MEDICAL DECISION MAKING FREE TEXT BOX
69yoM PMHx DM, HTN, HLD, previous CABG 2024, BIBEMS for acute shortness of breath. Patient reports starting to have shortness of breath at about 9pm last night, got acutely worse after he went to bed and woke up at about 1am and called 911. in EMS evaluation pt had significantly elevated BP and SPO2 on RA was around 50%, pt was diaphoretic at the scene.  EKG no acute ischemia.  Placed on CPAP given 3 nitro sublingual and brought him here.  Patient here is in respiratory distress increased work of breathing maintaining SpO2.  POCUS shows bilateral multiple B-lines.  EKG biphasic T wave in V2 V3 suspicious for Wellens.  Patient does not have chest pain.  Placed on BiPAP and nitro drip started.  Blood pressure allows for nitro drip..  Chest x-ray showed increased vascular marking suspicious for volume overload.  Given Lasix. Labs drawn and sent cardiology consulted accepted for stepdown admission . Labs showed elevated troponin and proBNP.

## 2025-06-01 NOTE — ED ADULT NURSE NOTE - OBJECTIVE STATEMENT
Pt BIBEMS after reporting SOB. Per EMS pt was given nitroglycerin sublingual and was placed on 15 L NRB. Upon arrival tot he ED pt was tachypneic, and switched to BIPAP. After placement in BIPAP appears to be more comfortable.

## 2025-06-01 NOTE — ED PROVIDER NOTE - OBJECTIVE STATEMENT
69yoM PMHx DM, HTN, HLD, previous cardiac bypass, BIBEMS for acute shortness of breath 69yoM PMHx DM, HTN, HLD, previous CABG 2024, BIBEMS for acute shortness of breath. Patient reports starting to have shortness of breath at about 9pm last night, got acutely worse after he went to bed and woke up at about 1am and called 911. EMS report he was satting 50s% on room air, very short of breath with BP 270s/120s. Patient improved significantly after sublingual nitro x3 and placed on BIPAP. Patient denies any chest pain, recent fever or falls, nausea, vomiting, diarrhea, leg swelling or recent medication changes.

## 2025-06-01 NOTE — H&P ADULT - ATTENDING COMMENTS
Patient seen and examined at bedside   69 year old male patient with PMHx of CAD s/p CABG x4 in 2024 , HTN, DL, DM , presented overnight  for worsening shortness of breath found to be on pulmonary edema in setting hypertensive emergency  Impression:  Acute Hypoxemic Hypercapnic Respiratory Failure  Hypertensive Emergency, Flash Pulmonary Edema  elevated cr 1.7 unknown baseline  hyperkalemia.   troponin mildly elevated       currently responding to IV diuresis ,and initially to BIPAP, on nitro drip,    BP still not well controlled  will continue IV diuretics, was off Bipap this morning   will switch amlodipine to nifedipine and continue hydralazine   unable to start ACE/ARB given elevated cr and hyperkalemia  will hold off on BB currently given acute decompensated heart failure (was not on BB at home)  will give low K agent and monitor electrolytes and kidney function     continue nitro drip   IF still not controlled will upgrade to CCU and start nicardipine drip, arterial line insertion. and Cueto catheter  TTE pending   trend trop   continue asa and statin , was started on trop for suspected MI, if trop trend down and EF normal can hold 48hrs

## 2025-06-01 NOTE — CHART NOTE - NSCHARTNOTEFT_GEN_A_CORE
Transfer from: T SDU    Transfer to: CCU    HPI:   HISTORY OF PRESENT ILLNESS & PAST MEDICAL HISTORY  69yoM PMHx DM, HTN, HLD, previous CABG 2024, BIBEMS for acute shortness of breath. Patient reports starting to have shortness of breath at about 9pm last night, got acutely worse after he went to bed and woke up at about 1am and called 911. EMS report he was satting 50s% on room air, very short of breath with BP 270s/120s. Patient improved significantly after sublingual nitro x3 and placed on BIPAP and nitro gtt, given Lasix 40 mg IVP x 2 after which BP normalized. Patient denies any chest pain, recent fever or falls, nausea, vomiting, diarrhea, leg swelling or recent medication changes. He follows cardiologist Dr. Casey      VITALS & PHYSICAL EXAMINATION  Vitals in the ED  T(F): 97.4, Max: 97.4 (01:59)  HR: 58 (56 - 74)  BP: 155/57 (155/57 - 189/86)  RR: 24 (20 - 28)  SpO2: 99% (97% - 100%) on 50% BiPAP     CONSTITUTIONAL: Awake, alert, tachypneic, increased WOB  SKIN: Warm, dry  HEAD: NCAT  EYES: Clear conjunctiva   ENT: MMM  NECK: Supple  CARD: RRR, S1, S2; no M/R/G, midline sternal scar  RESP: bilateral wheeze  ABD: Soft, nontender. No rebound, rigidity, or guarding  EXT: Pulses palpable distally, no significant lower extremity edema  NEURO: Grossly intact. Awake, alert, moving all extremities, no facial asymmetry.    LABS                           11.9   10.42 )-----------( 195    MCV 90.6                 38.5     138  |  103  |  27  ----------------------------( 295      AG: x   5.4   |  24  |  1.7 (baseline 0.9 in 2019)    Ca: 9.1   Phos: x    Mg: x     Protein, Total: 6.9 / Albumin: 4.1 /  T. Bili 0.2 / D. Bili x  /  AST 92 /  ALT 52 /      VBG pH: 7.20  /  pCO2: 68    /  pO2: 13    / HCO3: 27    /  Lactate: 3.5          Trop 21       IMAGING  ECG NSR  CXR appears congested w/ bilateral infiltrates  (06-01-25 @ 03:24)      HOSPITAL COURSE:  Patient was admitted to 4T SDU for hypertensive emergency with AHRF 2/2 flash pulmonary edema complicated by elevated troponin (type 1 vs type 2 NSTEMI) and WALT vs. CKD w/ hyperkalemia. Patient was started on nitro gtt, lasix 60mg BID and PO hydralazine and nifedipine. Patient was also loaded with ASA and started on heparin gtt.  TTE with EF 60-65%, no WMA, mild MR, mild-moderate AR. Trops downtrended, Cr remained stable however hyperkalemia persisted. BP also did not improve, nitro gtt uptrending, so patient was started on cardene drip, bumex 2mg BID, and upgraded to CCU for more intensive monitoring.    FOR FOLLOW UP:  [ ] Wean nitro gtt and cardene gtt (SBP goal 150-160)  [ ] monitor UOP goal net negative at least 1L  [ ] f/u 4pm BMP for K and TSH/A1C/lipids    ASSESSMENT & PLAN:   69yoM PMHx DM, HTN, HLD, previous CABG 2024 admitted for flash pulmonary edema and CHF.    #AHRF 2/2 flash pulmonary edema  #Hypertensive emergency   #Elevated troponin - r/o NSTEMI, suspect type 2  #Hx CABG 2024, 4x grafts  #HTN/HLD  - trop 21 >> 113 >> 99  - cards: Dr. 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

## 2025-06-01 NOTE — ED PROVIDER NOTE - PROGRESS NOTE DETAILS
OMRA-- pt given sublingual nitro while setting up IV, reports he feels much better after first three nitro sublingual tablets given in the field. /90, bedside echo shows Blines bilaterally and decreased EF. OMAR-- pt given sublingual nitro while setting up IV, reports he feels much better after first three nitro sublingual tablets given in the field. /90, bedside echo shows Blines bilaterally, decreased EF, collapsible IVC OMAR-- EKG concerning for Wellens Syndrome, cardiology consulted OMAR-- EKG concerning for Wellens Syndrome, cardiology consulted. On reeval, pt reports he is feeling much better

## 2025-06-01 NOTE — ED PROVIDER NOTE - PHYSICAL EXAMINATION
CONSTITUTIONAL: NAD  SKIN: Warm, dry  HEAD: NCAT  EYES: Clear conjunctiva   ENT: MMM  NECK: Supple  CARD: RRR, S1, S2; no M/R/G  RESP: Normal respiratory effort, CTAB  ABD: Soft, nontender. No rebound, rigidity, or guarding  EXT: Pulses palpable distally, no significant lower extremity edema  NEURO: Grossly intact. Awake, alert, moving all extremities, no facial asymmetry. CONSTITUTIONAL: Awake, alert, tachypneic, increased WOB  SKIN: Warm, dry  HEAD: NCAT  EYES: Clear conjunctiva   ENT: MMM  NECK: Supple  CARD: RRR, S1, S2; no M/R/G, midline sternal scar  RESP: bilateral cardiac wheeze  ABD: Soft, nontender. No rebound, rigidity, or guarding  EXT: Pulses palpable distally, no significant lower extremity edema  NEURO: Grossly intact. Awake, alert, moving all extremities, no facial asymmetry.

## 2025-06-01 NOTE — PROGRESS NOTE ADULT - SUBJECTIVE AND OBJECTIVE BOX
Patient is a 69y old Male who presents with a chief complaint of   Today is hospital day     Overnight events/Interval History:  - No acute overnight events  - Telemetry: no events   - At bedside, patient still orthopneic, no chest pain, breathing comfortably on room air. Denies fevers, chills, chest pain, N/V/D/C, abdominal pain.    ROS:  - All ROS is negative unless indicated in HPI    Code Status:    ALLERGIES:  No Known Allergies    MEDICATIONS:  STANDING MEDICATIONS  albuterol/ipratropium for Nebulization 3 milliLiter(s) Nebulizer every 6 hours  aspirin  chewable 81 milliGRAM(s) Oral daily  atorvastatin 80 milliGRAM(s) Oral at bedtime  furosemide   Injectable 60 milliGRAM(s) IV Push every 12 hours  heparin  Infusion.  Unit(s)/Hr IV Continuous <Continuous>  hydrALAZINE 100 milliGRAM(s) Oral three times a day  nitroglycerin  Infusion 80 MICROgram(s)/Min IV Continuous <Continuous>  sodium zirconium cyclosilicate 10 Gram(s) Oral three times a day    PRN MEDICATIONS  acetaminophen     Tablet .. 650 milliGRAM(s) Oral every 6 hours PRN  aluminum hydroxide/magnesium hydroxide/simethicone Suspension 30 milliLiter(s) Oral every 4 hours PRN  heparin   Injectable 8000 Unit(s) IV Push every 6 hours PRN  heparin   Injectable 4000 Unit(s) IV Push every 6 hours PRN  melatonin 3 milliGRAM(s) Oral at bedtime PRN      VITALS LAST 24H:  Vital Signs Last 24 Hrs  T(C): 36.7 (2025 06:55), Max: 36.7 (2025 06:55)  T(F): 98.1 (2025 06:55), Max: 98.1 (2025 06:55)  HR: 54 (2025 09:06) (52 - 74)  BP: 160/68 (2025 08:04) (148/67 - 189/86)  BP(mean): 98 (2025 08:04) (96 - 116)  RR: 20 (2025 09:06) (20 - 28)  SpO2: 97% (2025 09:06) (96% - 100%)    Parameters below as of 2025 09:06  Patient On (Oxygen Delivery Method): room air        PHYSICAL EXAM:  GENERAL: NAD  HEENT:  Moist mucous membranes, no JVD  CHEST/LUNG: bilateral rales R>L with expiratory wheeze, normal respiratory effort  HEART: Regular rate and rhythm  ABDOMEN: Soft, nontender, nondistended  EXTREMITIES:  No lower extremity edema bilaterally  NERVOUS SYSTEM:  A&Ox3, no focal deficits   SKIN: No rashes or lesions    LABS:                        10.5   10.00 )-----------( 186      ( 2025 04:15 )             33.8         138  |  106  |  27[H]  ----------------------------<  243[H]  5.8[H]   |  25  |  1.6[H]    Ca    8.6      2025 04:15  Phos  4.0       Mg     2.1         TPro  5.9[L]  /  Alb  3.5  /  TBili  0.2  /  DBili  x   /  AST  62[H]  /  ALT  44[H]  /  AlkPhos  123[H]      PTT - ( 2025 08:40 )  PTT:28.1 sec  Urinalysis Basic - ( 2025 08:02 )    Color: Yellow / Appearance: Clear / S.011 / pH: x  Gluc: x / Ketone: x  / Bili: Negative / Urobili: 0.2 mg/dL   Blood: x / Protein: 100 mg/dL / Nitrite: Negative   Leuk Esterase: Negative / RBC: 0 /HPF / WBC 7 /HPF   Sq Epi: x / Non Sq Epi: 4 /HPF / Bacteria: Negative /HPF        Lactate, Blood: 2.1 mmol/L *H* (25 @ 09:39)          RADIOLOGY:  CXR      CT

## 2025-06-01 NOTE — H&P ADULT - ASSESSMENT
69yoM PMHx DM, HTN, HLD, previous CABG 2024 admitted for flash pulmonary edema and CHF    Acute Hypoxemic Hypercapnic Respiratory Failure  Hypertensive Emergency, Flash Pulmonary Edema  HO CABG  HTN, HLD  -      WALT vs. CKD   -Cr 1.7, baseline wnl in 2019   -suspect cardiorenal etiology  -UA, RBUS, FeNa         DVT prophylaxis: heparin SC   GI prophylaxis: PPI  Diet: DASH, fluid restriction  Code status: full code   Activity: Bedrest for now   Dispo: cardiac SDU    69yoM PMHx DM, HTN, HLD, previous CABG 2024 admitted for flash pulmonary edema and CHF    Acute Hypoxemic Hypercapnic Respiratory Failure  Hypertensive Emergency, Flash Pulmonary Edema  HO CABG  HTN, HLD  -improved s/p Lasix and BiPAP, now comfortable and HD stable  -telemetry monitoring  -c/w Lasix 40 mg IV bid  -restrict fluid intake < 1.5 L / day  -strict I&O, target -ve 2 L / day  -monitor electrolytes, keep K > 4 and Mg > 2   -ECG & CXR in AM   -TTE  -c/w ASA, metop ER 25 mg qd, amlodipine 10 mg qd, atorva 40 mg qhs   -hold lisinopril for WALT for now       WALT vs. CKD   -Cr 1.7, baseline wnl in 2019   -suspect cardiorenal etiology  -UA, RBUS, FeNa   -hold lisinopril for WALT  -monitor Cr & BMP bid      DVT prophylaxis: heparin SC   GI prophylaxis: PPI  Diet: DASH, fluid restriction  Code status: full code   Activity: Bedrest for now   Dispo: cardiac SDU    69yoM PMHx DM, HTN, HLD, previous CABG 2024 admitted for flash pulmonary edema and CHF    Acute Hypoxemic Hypercapnic Respiratory Failure  Hypertensive Emergency, Flash Pulmonary Edema  HO CABG  HTN, HLD  -improved s/p Lasix and BiPAP, now comfortable and HD stable  -telemetry monitoring  -wean off nitro gtt   -c/w Lasix 60 mg IV bid  -restrict fluid intake < 1.5 L / day  -strict I&O, target -ve 2 L / day  -monitor electrolytes, keep K > 4 and Mg > 2   -ECG & CXR in AM   -TTE  -A1c, TSH, lipid panel  -c/w ASA, metop ER 25 mg qd, amlodipine 10 mg qd, atorva increase 40 to 80 mg qhs   -hold lisinopril for WALT for now       WALT vs. CKD   -Cr 1.7, baseline wnl in 2019   -suspect cardiorenal etiology  -UA, FeNa   -hold lisinopril for WALT  -monitor Cr & BMP bid      DVT prophylaxis: heparin SC   GI prophylaxis: PPI  Diet: DASH, fluid restriction  Code status: full code   Activity: Bedrest for now   Dispo: cardiac SDU    69yoM PMHx DM, HTN, HLD, previous CABG 2024 admitted for flash pulmonary edema and CHF    Acute Hypoxemic Hypercapnic Respiratory Failure  Hypertensive Emergency, Flash Pulmonary Edema  HO CABG  HTN, HLD  -improved s/p Lasix and BiPAP, now comfortable and HD stable  -telemetry monitoring  -wean off nitro gtt   -c/w Lasix 60 mg IV bid  -restrict fluid intake < 1.5 L / day  -strict I&O, target -ve 2 L / day  -monitor electrolytes, keep K > 4 and Mg > 2   -ECG & CXR in AM   -TTE  -A1c, TSH, lipid panel  -c/w ASA,, amlodipine 10 mg qd, atorva increase 40 to 80 mg qhs   -hold lisinopril for WALT for now     see attending note for final rec   WALT vs. CKD   -Cr 1.7, baseline wnl in 2019   -suspect cardiorenal etiology  -UA, FeNa   -hold lisinopril for WALT  -monitor Cr & BMP bid      DVT prophylaxis: heparin SC   GI prophylaxis: PPI  Diet: DASH, fluid restriction  Code status: full code   Activity: Bedrest for now   Dispo: cardiac SDU

## 2025-06-01 NOTE — ED PROVIDER NOTE - IV ALTEPLASE ADMIN OUTSIDE HIDDEN
Pt resting in stretcher. Resp even and unlabored. NAD. Fall precautions in place. Call bell in reach. Ongoing monitoring.      show

## 2025-06-01 NOTE — ED PROVIDER NOTE - WR INTERPRETATION 1
Chest x-ray no pneumothorax or hemothorax.  Increased vasculature markings suspicious for volume  overload/pulmonary edema

## 2025-06-01 NOTE — CONSULT NOTE ADULT - ASSESSMENT
69yoM PMHx DM, HTN, HLD, previous CABG 1/2024 in [facility] for acute shortness of breath. Patient reports starting to have shortness of breath at about 9pm last night, got acutely worse after he went to bed and woke up at about 1am     Impression:  Acute HF decompensation   Flash Pulmonary Edema  HTN Emergency   CAD s/p CABG 1/2024  HTN/DM/HLD    Plan:  - Patient coming in with HTN emergency, started on nitro drip  - POCUS with RWMA- inferoseptal WMA, EF mildly reduced, no previous echo to compare  - Wean down nitro drip  - Initial trop 21- trend in AM  - 2D echo, A1c, and lipid profile  - Aspirin 81, atorvastatin 80 mg  - Given lasix 40 mg once, start lasix 60 mg IV BID in AM, target net neg 1.5-2L   - Serial EKG, repeat one in AM  - Cardiac Stepdown

## 2025-06-01 NOTE — ED ADULT TRIAGE NOTE - CHIEF COMPLAINT QUOTE
Pt came c/o feeling short of breath at 9 pm, went to sleep and woke up at 1 am feeling worse. When EMS arrived, pt had tachypnea at 30 BPM, and had bilateral crackles. Pt was placed on CPAP by EMS and NTG x 3 SL was given prior coming to ER.

## 2025-06-01 NOTE — H&P ADULT - HISTORY OF PRESENT ILLNESS
HISTORY OF PRESENT ILLNESS & PAST MEDICAL HISTORY  69yoM PMHx DM, HTN, HLD, previous CABG 2024, BIBEMS for acute shortness of breath. Patient reports starting to have shortness of breath at about 9pm last night, got acutely worse after he went to bed and woke up at about 1am and called 911. EMS report he was satting 50s% on room air, very short of breath with BP 270s/120s. Patient improved significantly after sublingual nitro x3 and placed on BIPAP and nitro gtt, given Lasix 40 mg IVP x 2 after which BP normalized. Patient denies any chest pain, recent fever or falls, nausea, vomiting, diarrhea, leg swelling or recent medication changes.      VITALS & PHYSICAL EXAMINATION  Vitals in the ED  T(F): 97.4, Max: 97.4 (01:59)  HR: 58 (56 - 74)  BP: 155/57 (155/57 - 189/86)  RR: 24 (20 - 28)  SpO2: 99% (97% - 100%) on BiPAP     CONSTITUTIONAL: Awake, alert, tachypneic, increased WOB  SKIN: Warm, dry  HEAD: NCAT  EYES: Clear conjunctiva   ENT: MMM  NECK: Supple  CARD: RRR, S1, S2; no M/R/G, midline sternal scar  RESP: bilateral wheeze  ABD: Soft, nontender. No rebound, rigidity, or guarding  EXT: Pulses palpable distally, no significant lower extremity edema  NEURO: Grossly intact. Awake, alert, moving all extremities, no facial asymmetry.    LABS                           11.9   10.42 )-----------( 195    MCV 90.6                 38.5     138  |  103  |  27  ----------------------------( 295      AG: x   5.4   |  24  |  1.7 (baseline 0.9 in 2019)    Ca: 9.1   Phos: x    Mg: x     Protein, Total: 6.9 / Albumin: 4.1 /  T. Bili 0.2 / D. Bili x  /  AST 92 /  ALT 52 /      VBG pH: 7.20  /  pCO2: 68    /  pO2: 13    / HCO3: 27    /  Lactate: 3.5          Trop 21       IMAGING  ECG NSR  CXR appears congested w/ bilateral infiltrates  HISTORY OF PRESENT ILLNESS & PAST MEDICAL HISTORY  69yoM PMHx DM, HTN, HLD, previous CABG 2024, BIBEMS for acute shortness of breath. Patient reports starting to have shortness of breath at about 9pm last night, got acutely worse after he went to bed and woke up at about 1am and called 911. EMS report he was satting 50s% on room air, very short of breath with BP 270s/120s. Patient improved significantly after sublingual nitro x3 and placed on BIPAP and nitro gtt, given Lasix 40 mg IVP x 2 after which BP normalized. Patient denies any chest pain, recent fever or falls, nausea, vomiting, diarrhea, leg swelling or recent medication changes. He follows cardiologist Dr. Casey      VITALS & PHYSICAL EXAMINATION  Vitals in the ED  T(F): 97.4, Max: 97.4 (01:59)  HR: 58 (56 - 74)  BP: 155/57 (155/57 - 189/86)  RR: 24 (20 - 28)  SpO2: 99% (97% - 100%) on 50% BiPAP     CONSTITUTIONAL: Awake, alert, tachypneic, increased WOB  SKIN: Warm, dry  HEAD: NCAT  EYES: Clear conjunctiva   ENT: MMM  NECK: Supple  CARD: RRR, S1, S2; no M/R/G, midline sternal scar  RESP: bilateral wheeze  ABD: Soft, nontender. No rebound, rigidity, or guarding  EXT: Pulses palpable distally, no significant lower extremity edema  NEURO: Grossly intact. Awake, alert, moving all extremities, no facial asymmetry.    LABS                           11.9   10.42 )-----------( 195    MCV 90.6                 38.5     138  |  103  |  27  ----------------------------( 295      AG: x   5.4   |  24  |  1.7 (baseline 0.9 in 2019)    Ca: 9.1   Phos: x    Mg: x     Protein, Total: 6.9 / Albumin: 4.1 /  T. Bili 0.2 / D. Bili x  /  AST 92 /  ALT 52 /      VBG pH: 7.20  /  pCO2: 68    /  pO2: 13    / HCO3: 27    /  Lactate: 3.5          Trop 21       IMAGING  ECG NSR  CXR appears congested w/ bilateral infiltrates

## 2025-06-02 NOTE — PROGRESS NOTE ADULT - SUBJECTIVE AND OBJECTIVE BOX
24H events:    Patient is a 69y old Male who presents with a chief complaint of   Primary diagnosis of Flash pulmonary edema       Today is hospital day 1d. This morning patient was seen and examined at bedside, resting comfortably in bed. Off nitro gtt. On nicardipine gtt. Started on isordil.     PAST MEDICAL & SURGICAL HISTORY    SOCIAL HISTORY:  Social History:      ALLERGIES:  No Known Allergies    MEDICATIONS:  STANDING MEDICATIONS  albuterol/ipratropium for Nebulization 3 milliLiter(s) Nebulizer every 6 hours  aspirin  chewable 81 milliGRAM(s) Oral daily  atorvastatin 80 milliGRAM(s) Oral at bedtime  buMETAnide Injectable 2 milliGRAM(s) IV Push every 12 hours  chlorhexidine 2% Cloths 1 Application(s) Topical <User Schedule>  dextrose 5%. 1000 milliLiter(s) IV Continuous <Continuous>  dextrose 5%. 1000 milliLiter(s) IV Continuous <Continuous>  dextrose 50% Injectable 25 Gram(s) IV Push once  dextrose 50% Injectable 12.5 Gram(s) IV Push once  dextrose 50% Injectable 25 Gram(s) IV Push once  glucagon  Injectable 1 milliGRAM(s) IntraMuscular once  heparin  Infusion.  Unit(s)/Hr IV Continuous <Continuous>  hydrALAZINE 100 milliGRAM(s) Oral three times a day  insulin glargine Injectable (LANTUS) 5 Unit(s) SubCutaneous at bedtime  insulin lispro (ADMELOG) corrective regimen sliding scale   SubCutaneous three times a day before meals  insulin lispro Injectable (ADMELOG) 3 Unit(s) SubCutaneous three times a day before meals  isosorbide   dinitrate Tablet (ISORDIL) 10 milliGRAM(s) Oral three times a day  niCARdipine Infusion 2.5 mG/Hr IV Continuous <Continuous>  NIFEdipine XL 60 milliGRAM(s) Oral daily  nitroglycerin  Infusion 80 MICROgram(s)/Min IV Continuous <Continuous>    PRN MEDICATIONS  acetaminophen     Tablet .. 650 milliGRAM(s) Oral every 6 hours PRN  aluminum hydroxide/magnesium hydroxide/simethicone Suspension 30 milliLiter(s) Oral every 4 hours PRN  dextrose Oral Gel 15 Gram(s) Oral once PRN  heparin   Injectable 8000 Unit(s) IV Push every 6 hours PRN  heparin   Injectable 4000 Unit(s) IV Push every 6 hours PRN  melatonin 3 milliGRAM(s) Oral at bedtime PRN    VITALS:   T(F): 98.2  HR: 67  BP: 126/58  RR: 19  SpO2: 99%    PHYSICAL EXAM:  GENERAL: NAD, well-groomed, well-developed  HEAD:  Atraumatic, Normocephalic  EYES: EOMI  NECK: Supple  NERVOUS SYSTEM:  Alert & Oriented X3, non focal   CHEST/LUNG: Clear to auscultation bilaterally; No rales, rhonchi, wheezing, or rubs  HEART: Regular rate and rhythm; No murmurs, rubs, or gallops  ABDOMEN: Soft, Nontender, Nondistended; Bowel sounds present  EXTREMITIES:  2+ Peripheral Pulses, No clubbing, cyanosis, or edema  LYMPH: No lymphadenopathy noted  SKIN: No rashes or lesions  LABS:                        9.8    11.56 )-----------( 186      ( 2025 05:40 )             29.6         141  |  104  |  25[H]  ----------------------------<  169[H]  3.6   |  23  |  1.4    Ca    8.9      2025 15:46  Phos  4.0       Mg     1.8         TPro  5.9[L]  /  Alb  3.5  /  TBili  0.2  /  DBili  x   /  AST  62[H]  /  ALT  44[H]  /  AlkPhos  123[H]      PTT - ( 2025 00:20 )  PTT:27.7 sec  Urinalysis Basic - ( 2025 20:14 )    Color: Yellow / Appearance: Clear / S.015 / pH: x  Gluc: x / Ketone: x  / Bili: Negative / Urobili: 0.2 mg/dL   Blood: x / Protein: 300 mg/dL / Nitrite: Negative   Leuk Esterase: Negative / RBC: 9 /HPF / WBC 8 /HPF   Sq Epi: x / Non Sq Epi: 8 /HPF / Bacteria: Occasional /HPF        Lactate, Blood: 2.1 mmol/L *H* (25 @ 09:39)   24H events:    Patient is a 69y old Male who presents with a chief complaint of   Primary diagnosis of Flash pulmonary edema       Today is hospital day 1d. This morning patient was seen and examined at bedside, resting comfortably in bed. Off nitro gtt. On nicardipine gtt. Started on isordil.     PAST MEDICAL & SURGICAL HISTORY    SOCIAL HISTORY:  Social History:      ALLERGIES:  No Known Allergies    MEDICATIONS:  STANDING MEDICATIONS  albuterol/ipratropium for Nebulization 3 milliLiter(s) Nebulizer every 6 hours  aspirin  chewable 81 milliGRAM(s) Oral daily  atorvastatin 80 milliGRAM(s) Oral at bedtime  buMETAnide Injectable 2 milliGRAM(s) IV Push every 12 hours  chlorhexidine 2% Cloths 1 Application(s) Topical <User Schedule>  dextrose 5%. 1000 milliLiter(s) IV Continuous <Continuous>  dextrose 5%. 1000 milliLiter(s) IV Continuous <Continuous>  dextrose 50% Injectable 25 Gram(s) IV Push once  dextrose 50% Injectable 12.5 Gram(s) IV Push once  dextrose 50% Injectable 25 Gram(s) IV Push once  glucagon  Injectable 1 milliGRAM(s) IntraMuscular once  heparin  Infusion.  Unit(s)/Hr IV Continuous <Continuous>  hydrALAZINE 100 milliGRAM(s) Oral three times a day  insulin glargine Injectable (LANTUS) 5 Unit(s) SubCutaneous at bedtime  insulin lispro (ADMELOG) corrective regimen sliding scale   SubCutaneous three times a day before meals  insulin lispro Injectable (ADMELOG) 3 Unit(s) SubCutaneous three times a day before meals  isosorbide   dinitrate Tablet (ISORDIL) 10 milliGRAM(s) Oral three times a day  niCARdipine Infusion 2.5 mG/Hr IV Continuous <Continuous>  NIFEdipine XL 60 milliGRAM(s) Oral daily  nitroglycerin  Infusion 80 MICROgram(s)/Min IV Continuous <Continuous>    PRN MEDICATIONS  acetaminophen     Tablet .. 650 milliGRAM(s) Oral every 6 hours PRN  aluminum hydroxide/magnesium hydroxide/simethicone Suspension 30 milliLiter(s) Oral every 4 hours PRN  dextrose Oral Gel 15 Gram(s) Oral once PRN  heparin   Injectable 8000 Unit(s) IV Push every 6 hours PRN  heparin   Injectable 4000 Unit(s) IV Push every 6 hours PRN  melatonin 3 milliGRAM(s) Oral at bedtime PRN    VITALS:   T(F): 98.2  HR: 67  BP: 126/58  RR: 19  SpO2: 99%    PHYSICAL EXAM:  GENERAL: NAD, well-groomed, well-developed  HEAD:  Atraumatic, Normocephalic  EYES: EOMI  NECK: Supple  NERVOUS SYSTEM:  Alert & Oriented X3, non focal   CHEST/LUNG: Clear to auscultation bilaterally; No rales, rhonchi, wheezing, or rubs, on RA  HEART: Regular rate and rhythm; No murmurs, rubs, or gallops  ABDOMEN: Soft, Nontender, Nondistended; Bowel sounds present  EXTREMITIES:  2+ Peripheral Pulses, No clubbing, cyanosis, or edema  LYMPH: No lymphadenopathy noted  SKIN: No rashes or lesions  LABS:                        9.8    11.56 )-----------( 186      ( 2025 05:40 )             29.6     06    141  |  104  |  25[H]  ----------------------------<  169[H]  3.6   |  23  |  1.4    Ca    8.9      2025 15:46  Phos  4.0       Mg     1.8         TPro  5.9[L]  /  Alb  3.5  /  TBili  0.2  /  DBili  x   /  AST  62[H]  /  ALT  44[H]  /  AlkPhos  123[H]      PTT - ( 2025 00:20 )  PTT:27.7 sec  Urinalysis Basic - ( 2025 20:14 )    Color: Yellow / Appearance: Clear / S.015 / pH: x  Gluc: x / Ketone: x  / Bili: Negative / Urobili: 0.2 mg/dL   Blood: x / Protein: 300 mg/dL / Nitrite: Negative   Leuk Esterase: Negative / RBC: 9 /HPF / WBC 8 /HPF   Sq Epi: x / Non Sq Epi: 8 /HPF / Bacteria: Occasional /HPF        Lactate, Blood: 2.1 mmol/L *H* (25 @ 09:39)

## 2025-06-02 NOTE — PROGRESS NOTE ADULT - ASSESSMENT
69yoM PMHx DM, HTN, HLD, previous CABG 2024 admitted for flash pulmonary edema and CHF.    #AHRF 2/2 flash pulmonary edema  #Hypertensive emergency   #Elevated troponin - r/o NSTEMI, suspect type 2  #Hx CABG 2024, 4x grafts  #HTN/HLD  - trop 21 >> 113 >> 99  - cards: Dr. 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 69yoM PMHx DM, HTN, HLD, previous CABG 2024 admitted for flash pulmonary edema and CHF.    #AHRF 2/2 flash pulmonary edema  #Hypertensive emergency   #Elevated troponin - r/o NSTEMI, suspect type 2  #Hx CABG 2024, 4x grafts  #HTN/HLD  - trop 21 >> 113 >> 99 >> 62 >> 80  - EKG   - cards: Dr. Casey at [facility]  - CXR improved today  -Off NC today, comfortable on RA  - No events on tele  - off nitro gtt, on nicardipine gtt @ 5 this AM, wean off as tolerated  - Starting coreg 6.25 mg BID  - IVC 2.1 cm collapsible -> dc bumex  - c/w atorvastatin 80mg qhs  - c/w asa 81mg qd  - c/w heparin gtt   - hold BB due to concern for ADHF  - c/w hydralazine 100mg TID  - c/w procardia 30 mg QD  - A1c 6.5  - TSH 0.72  - Lipid profile notable for cholesterol 203 and   - Echo 06/01/25 EF 60-65%, mild MR/TR/AR/NJ, normal global systolic function   - fluid restriction 1.5L, strict I/O, daily weight   - Secondary HTN workup sent: renal duplex, renin:aldosterone ratio, metanephrines    #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

## 2025-06-03 NOTE — CHART NOTE - NSCHARTNOTEFT_GEN_A_CORE
MCOT placed 6/3/25  All instructions given to patient and all questions answered  Follow up wit Dr Mayes in 6 weeks    Recall EP as needed 0036

## 2025-06-03 NOTE — DIETITIAN INITIAL EVALUATION ADULT - PERTINENT MEDS FT
MEDICATIONS  (STANDING):  albuterol/ipratropium for Nebulization 3 milliLiter(s) Nebulizer every 6 hours  apixaban 5 milliGRAM(s) Oral every 12 hours  aspirin  chewable 81 milliGRAM(s) Oral daily  atorvastatin 80 milliGRAM(s) Oral at bedtime  carvedilol 6.25 milliGRAM(s) Oral every 12 hours  chlorhexidine 2% Cloths 1 Application(s) Topical <User Schedule>  dextrose 5%. 1000 milliLiter(s) (100 mL/Hr) IV Continuous <Continuous>  dextrose 5%. 1000 milliLiter(s) (50 mL/Hr) IV Continuous <Continuous>  dextrose 50% Injectable 25 Gram(s) IV Push once  dextrose 50% Injectable 12.5 Gram(s) IV Push once  dextrose 50% Injectable 25 Gram(s) IV Push once  glucagon  Injectable 1 milliGRAM(s) IntraMuscular once  hydrALAZINE 100 milliGRAM(s) Oral three times a day  ibutilide IVPB 1 milliGRAM(s) IV Intermittent once  insulin glargine Injectable (LANTUS) 9 Unit(s) SubCutaneous at bedtime  insulin lispro (ADMELOG) corrective regimen sliding scale   SubCutaneous three times a day before meals  insulin lispro Injectable (ADMELOG) 3 Unit(s) SubCutaneous three times a day before meals  isosorbide   dinitrate Tablet (ISORDIL) 10 milliGRAM(s) Oral three times a day  lactulose Syrup 10 Gram(s) Oral three times a day  magnesium sulfate  IVPB 2 Gram(s) IV Intermittent once  NIFEdipine XL 60 milliGRAM(s) Oral daily  polyethylene glycol 3350 17 Gram(s) Oral two times a day  senna 2 Tablet(s) Oral at bedtime    MEDICATIONS  (PRN):  acetaminophen     Tablet .. 650 milliGRAM(s) Oral every 6 hours PRN Temp greater or equal to 38C (100.4F), Mild Pain (1 - 3)  aluminum hydroxide/magnesium hydroxide/simethicone Suspension 30 milliLiter(s) Oral every 4 hours PRN Dyspepsia  dextrose Oral Gel 15 Gram(s) Oral once PRN Blood Glucose LESS THAN 70 milliGRAM(s)/deciliter  melatonin 3 milliGRAM(s) Oral at bedtime PRN Insomnia

## 2025-06-03 NOTE — DISCHARGE NOTE PROVIDER - PROVIDER TOKENS
PROVIDER:[TOKEN:[[id number]],FOLLOWUP:[2 weeks]],PROVIDER:[TOKEN:[[id number]],FOLLOWUP:[2 weeks]],PROVIDER:[TOKEN:[[id number]],FOLLOWUP:[2 weeks]],PROVIDER:[TOKEN:[[id number]],FOLLOWUP:[1 week]] PROVIDER:[TOKEN:[[id number]],FOLLOWUP:[2 weeks]],PROVIDER:[TOKEN:[[id number]],FOLLOWUP:[1 month]],PROVIDER:[TOKEN:[[id number]],FOLLOWUP:[2 weeks]],PROVIDER:[TOKEN:[[id number]],FOLLOWUP:[1 week]]

## 2025-06-03 NOTE — PROGRESS NOTE ADULT - ASSESSMENT
69yoM PMHx DM, HTN, HLD, previous CABG 2024 admitted for flash pulmonary edema and CHF. Hospital course complicated by new onset AFib rate controlled (06/03 overnight).     #AHRF 2/2 flash pulmonary edema  #Hypertensive emergency   #Elevated troponin - r/o NSTEMI, suspect type 2  #Hx CABG 2024, 4x grafts  #HTN/HLD  - trop 21 >> 113 >> 99 >> 62 >> 80  - EKG   - cards: Dr. Casey at [facility]  - CXR improved today  -Off NC today, comfortable on RA  - No events on tele  - off nitro gtt, on nicardipine gtt @ 5 this AM, wean off as tolerated  - Starting coreg 6.25 mg BID  - IVC 2.1 cm collapsible -> dc bumex  - c/w atorvastatin 80mg qhs  - c/w asa 81mg qd  - c/w heparin gtt   - hold BB due to concern for ADHF  - c/w hydralazine 100mg TID  - c/w procardia 30 mg QD  - A1c 6.5  - TSH 0.72  - Lipid profile notable for cholesterol 203 and   - Echo 06/01/25 EF 60-65%, mild MR/TR/AR/WA, normal global systolic function   - fluid restriction 1.5L, strict I/O, daily weight   - Secondary HTN workup sent: renal duplex, renin:aldosterone ratio, metanephrines    #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 69yoM PMHx DM, HTN, HLD, previous CABG 2024 admitted for flash pulmonary edema and CHF. Hospital course complicated by new onset AFib rate controlled (06/02 overnight).     #AHRF 2/2 flash pulmonary edema  #Hypertensive emergency   #New onset Afib 06/02 overnight  #Elevated troponin - Wellen's syndrome  #Hx CABG 2024, 4x grafts  #HTN/HLD  - trop 21 >> 113 >> 99 >> 62 >> 80  - EKG biphasic T waves V2-V3  - cards: Dr. Casey at [facility]  - CXR improved today  -Off NC today, comfortable on RA  - Afib 11PM 06/02 onwards, rate controlled  - off nitro gtt, off nicardipine gtt 06/02  - C/w coreg 6.25 mg BID, on discharge tentatively increase  - Bumex discontinued 06/02 given WALT and collapsible IVC (today 1.2 cm collapsible)  - c/w atorvastatin 80mg qhs  - c/w asa 81mg qd  - c/w hydralazine 100mg TID  - c/w isordil 10 mg TID  - c/w procardia 60 mg QD, tentatively increase on discharge  - A1c 6.5  - TSH 0.72  - Lipid profile notable for cholesterol 203 and   - Echo 06/01/25 EF 60-65%, mild MR/TR/AR/NH, normal global systolic function   - fluid restriction 1.5L, strict I/O, daily weight   - Chemical cardioversion with ibutilide today; precede with MgSO4 2g  - dc heparin gtt 06/03 AM, start eliquis (CHADVAS 4, HASBLED 3)  - Secondary HTN workup sent: renal duplex performed, renin:aldosterone ratio, metanephrines received -> OP follow up with Dr. 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 69yoM PMHx DM, HTN, HLD, previous CABG 2024 admitted for flash pulmonary edema and CHF. Hospital course complicated by new onset AFib rate controlled (06/02 overnight).     #AHRF 2/2 flash pulmonary edema  #Hypertensive emergency   #New onset Afib 06/02 overnight  #Elevated troponin - Wellen's syndrome  #Hx CABG 2024, 4x grafts  #HTN/HLD  - trop 21 >> 113 >> 99 >> 62 >> 80  - EKG biphasic T waves V2-V3  - cards: Dr. Casey at [facility]  - CXR improved today  -Off NC today, comfortable on RA  - Afib 11PM 06/02 onwards, rate controlled  - off nitro gtt, off nicardipine gtt 06/02  - C/w coreg 6.25 mg BID, on discharge tentatively increase  - Bumex discontinued 06/02 given WALT and collapsible IVC (today 1.2 cm collapsible)  - c/w atorvastatin 80mg qhs  - c/w asa 81mg qd  - c/w hydralazine 100mg TID  - c/w isordil 10 mg TID  - c/w procardia 60 mg QD, tentatively increase on discharge  - A1c 6.5  - TSH 0.72  - Lipid profile notable for cholesterol 203 and   - Echo 06/01/25 EF 60-65%, mild MR/TR/AR/NV, normal global systolic function   - fluid restriction 1.5L, strict I/O, daily weight   - Chemical cardioversion with ibutilide today; precede with MgSO4 2g -> self converted to NSR post magnesium, ibutilide not needed  - dc heparin gtt 06/03 AM, start eliquis (CHADVAS 4, HASBLED 3)  - Secondary HTN workup sent: renal duplex performed, renin:aldosterone ratio, metanephrines received -> OP follow up with Dr. 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

## 2025-06-03 NOTE — DISCHARGE NOTE PROVIDER - CARE PROVIDER_API CALL
Behuria, Supreeti  Cardiology  [address], [address]  [place] [address]  Phone: [phone]  Fax: [phone]  Follow Up Time: 2 weeks    Gaurav Santiago  Cardiac Electrophysiology  [address], [address]  [place] [address]  Phone: [phone]  Fax: [phone]  Follow Up Time: 2 weeks    Bindu Brown  Pulmonary Disease  [address], [address]  [place] [address]  Phone: [phone]  Fax: [phone]  Follow Up Time: 2 weeks    Yvon Traylor  Internal Medicine  [address] [place] [address]  Phone: [phone]  Fax: [phone]  Follow Up Time: 1 week   Behuria, Supreeti  Cardiology  [address], [address]  [place] [address]  Phone: [phone]  Fax: [phone]  Follow Up Time: 2 weeks    Gaurav Santiago  Cardiac Electrophysiology  [address], [address]  [place] [address]  Phone: [phone]  Fax: [phone]  Follow Up Time: 1 month    Bindu Brown  Pulmonary Disease  [address] [facility] [address], [address]  [place] [address]  Phone: [phone]  Fax: [phone]  Follow Up Time: 2 weeks    Yvon Traylor  Internal Medicine  [address] [place] [address]  Phone: [phone]  Fax: [phone]  Follow Up Time: 1 week

## 2025-06-03 NOTE — DIETITIAN INITIAL EVALUATION ADULT - ORAL INTAKE PTA/DIET HISTORY
Pt reports having a good appetite; consumes 2 meals daily. Denies taking vitamin or mineral supplements. Denies drinking protein shake supplements. NKFA; denies any restrictive cultural or Taoism food preferences. No chewing or swallowing difficulties noted with regular textured food.

## 2025-06-03 NOTE — DISCHARGE NOTE PROVIDER - NSDCFUADDAPPT_GEN_ALL_CORE_FT
APPTS ARE READY TO BE MADE: [ ] YES    Best Family or Patient Contact (if needed):    Additional Information about above appointments (if needed):    1: Dr. Behuria cardiologist 2 wks  2: Dr. Pamela MENDOZA 2 wks  3: Dr. Braga 2 wks  4: Dr. Traylor 1 wk    Other comments or requests:    APPTS ARE READY TO BE MADE: [ ] YES    Best Family or Patient Contact (if needed):    Additional Information about above appointments (if needed):    1: Dr. Behuria cardiologist  2: Dr. Santiago EP 4 wks  3: Dr. Braga 2 wks  4: Dr. Traylor 1 wk    Other comments or requests:

## 2025-06-03 NOTE — DIETITIAN INITIAL EVALUATION ADULT - NAME AND PHONE
Valerie [phone] or TEAMS    Nutrition Interventions: Meals, snacks, supplements; Nutrition Monitoring: Diet order, PO intake, weights, labs, NFPF, body composition, BM and tolerance to medical food supplements

## 2025-06-03 NOTE — DIETITIAN INITIAL EVALUATION ADULT - PERTINENT LABORATORY DATA
06-03    139  |  105  |  36[H]  ----------------------------<  178[H]  4.3   |  21  |  1.8[H]    Ca    8.5      03 Jun 2025 04:24  Phos  3.9     06-03  Mg     2.1     06-03    TPro  5.9[L]  /  Alb  3.3[L]  /  TBili  0.6  /  DBili  x   /  AST  16  /  ALT  20  /  AlkPhos  98  06-03  POCT Blood Glucose.: 119 mg/dL (06-03-25 @ 12:21)  A1C with Estimated Average Glucose Result: 6.5 % (06-02-25 @ 05:40)  A1C with Estimated Average Glucose Result: 6.8 % (06-01-25 @ 04:15)

## 2025-06-03 NOTE — DIETITIAN INITIAL EVALUATION ADULT - NS FNS DIET ORDER
Diet, Consistent Carbohydrate/No Snacks:   DASH/TLC {Sodium & Cholesterol Restricted} (DASH)  1500mL Fluid Restriction ([id number]) (06-01-25 @ 11:28) [Active]

## 2025-06-03 NOTE — DIETITIAN INITIAL EVALUATION ADULT - OTHER CALCULATIONS
Using ABW: ENERGY: 0797-0490 kcal/day (MSJ 1704.085*1-1.2 SF); PROTEIN: 105-140 g/day (1.5-2 g/kg IBW 70 KG); FLUID: 1500 mL fluid restriction -- with consideratio for age, BMI. critical care setting, fluid restriction

## 2025-06-03 NOTE — CONSULT NOTE ADULT - ASSESSMENT
Cardiologist: Dr Casey ([facility])    69yoM PMHx DM, HTN, HLD, previous CABG 2024 admitted for flash pulmonary edema and CHF. Hospital course complicated by new onset AFib rate controlled (06/02 overnight), now NSR    Impression:  New AF, now NSR  CHADSVASC = 4  Pulmonary Edema, resolved  HTN Emergency, resolved  HLD  DM  CAD sp CABG    Plan:  - Start Eliquis 5mg Q12h for stroke prevention  - Cont Coreg  - Tele monitoring while in hospital  - Will place MCOT on discharge for AF burden  - Follow up with Dr Santiago in 1 month Cardiologist: Dr Casey ([facility])    69yoM PMHx DM, HTN, HLD, previous CABG 2024 admitted for flash pulmonary edema and CHF. Hospital course complicated by new onset AFib rate controlled (06/02 overnight), now NSR    Impression:  New AF, now NSR  CHADSVASC = 4  Pulmonary Edema, resolved  HTN Emergency, resolved  HLD  DM  CAD sp CABG    Plan:  - Start Eliquis 5mg Q12h for stroke prevention  - Cont Coreg  - Tele monitoring while in hospital  - Will place MCOT on discharge for AF burden  - Follow up with Dr Mayes in 1 month

## 2025-06-03 NOTE — DISCHARGE NOTE NURSING/CASE MANAGEMENT/SOCIAL WORK - PATIENT PORTAL LINK FT
You can access the FollowMyHealth Patient Portal offered by [facility] by registering at the following website: [email or web address]. By joining [employer]’s FollowMyHealth portal, you will also be able to view your health information using other applications (apps) compatible with our system.

## 2025-06-03 NOTE — DIETITIAN INITIAL EVALUATION ADULT - ADD RECOMMEND
1. ADD Ensure Max 1X/DAILY -- provides 150 kcal, 30g pro total  2. Continue with current diet order  3. Encourage PO intake    High risk f/u

## 2025-06-03 NOTE — CONSULT NOTE ADULT - SUBJECTIVE AND OBJECTIVE BOX
Patient is a 69y old  Male who presents with a chief complaint of HTN emergency (2025 10:47)    HPI: Patient is a 69yoM PMHx DM, HTN, HLD, previous CABG , BIBEMS for acute shortness of breath. Patient reports starting to have shortness of breath at about 9pm last night, got acutely worse after he went to bed and woke up at about 1am and called 911. EMS report he was satting 50s% on room air, very short of breath with BP 270s/120s. Patient improved significantly after sublingual nitro x3 and placed on BIPAP and nitro gtt, given Lasix 40 mg IVP x 2 after which BP normalized. Patient denies any chest pain, recent fever or falls, nausea, vomiting, diarrhea, leg swelling or recent medication changes. He follows cardiologist Dr. Casey. Overnight pt went into AF RVR and self converted to NSR. No previous hx of arrhythmias.      PAST MEDICAL & SURGICAL HISTORY:  HTN  HLD  DM  CAD      PREVIOUS DIAGNOSTIC TESTING:      ECHO  FINDINGS:  < from: TTE Echo Complete w/ Contrast w/o Doppler (25 @ 11:50) >  Summary:   1. Left ventricular ejection fraction, by visual estimation, is 60 to   65%.   2. Normal global left ventricular systolic function.   3. Mildly enlarged left atrium.   4. Normal right atrial size.   5. Mild mitral valve regurgitation.   6. Mild tricuspid regurgitation.   7. Mild to moderate aortic regurgitation.   8. Mild pulmonic valve regurgitation.   9. Endocardial visualization was enhanced with intravenous echo contrast.    < end of copied text >      STRESS  FINDINGS:    CATHETERIZATION  FINDINGS:    ELECTROPHYSIOLOGY STUDY  FINDINGS:    CAROTID ULTRASOUND:  FINDINGS    VENOUS DUPLEX SCAN:  FINDINGS:    CHEST CT PULMONARY ANGIO with IV Contrast:  FINDINGS:    MEDICATIONS  (STANDING):  albuterol/ipratropium for Nebulization 3 milliLiter(s) Nebulizer every 6 hours  apixaban 5 milliGRAM(s) Oral every 12 hours  aspirin  chewable 81 milliGRAM(s) Oral daily  atorvastatin 80 milliGRAM(s) Oral at bedtime  carvedilol 6.25 milliGRAM(s) Oral every 12 hours  chlorhexidine 2% Cloths 1 Application(s) Topical <User Schedule>  dextrose 5%. 1000 milliLiter(s) (100 mL/Hr) IV Continuous <Continuous>  dextrose 5%. 1000 milliLiter(s) (50 mL/Hr) IV Continuous <Continuous>  dextrose 50% Injectable 25 Gram(s) IV Push once  dextrose 50% Injectable 12.5 Gram(s) IV Push once  dextrose 50% Injectable 25 Gram(s) IV Push once  glucagon  Injectable 1 milliGRAM(s) IntraMuscular once  hydrALAZINE 100 milliGRAM(s) Oral three times a day  ibutilide IVPB 1 milliGRAM(s) IV Intermittent once  insulin glargine Injectable (LANTUS) 9 Unit(s) SubCutaneous at bedtime  insulin lispro (ADMELOG) corrective regimen sliding scale   SubCutaneous three times a day before meals  insulin lispro Injectable (ADMELOG) 3 Unit(s) SubCutaneous three times a day before meals  isosorbide   dinitrate Tablet (ISORDIL) 10 milliGRAM(s) Oral three times a day  lactulose Syrup 10 Gram(s) Oral three times a day  magnesium sulfate  IVPB 2 Gram(s) IV Intermittent once  NIFEdipine XL 60 milliGRAM(s) Oral daily  polyethylene glycol 3350 17 Gram(s) Oral two times a day  senna 2 Tablet(s) Oral at bedtime    MEDICATIONS  (PRN):  acetaminophen     Tablet .. 650 milliGRAM(s) Oral every 6 hours PRN Temp greater or equal to 38C (100.4F), Mild Pain (1 - 3)  aluminum hydroxide/magnesium hydroxide/simethicone Suspension 30 milliLiter(s) Oral every 4 hours PRN Dyspepsia  dextrose Oral Gel 15 Gram(s) Oral once PRN Blood Glucose LESS THAN 70 milliGRAM(s)/deciliter  melatonin 3 milliGRAM(s) Oral at bedtime PRN Insomnia      FAMILY HISTORY: No significant hx    SOCIAL HISTORY: No smoking, ETOH or illicit drug use    Past Surgical History: see above    Allergies:  No Known Allergies      REVIEW OF SYSTEMS: No CP, palpitations, dizziness or SOB       Vital Signs Last 24 Hrs  T(C): 36.5 (2025 04:00), Max: 36.8 (2025 00:00)  T(F): 97.7 (2025 04:00), Max: 98.2 (2025 00:00)  HR: 82 (2025 11:00) (71 - 113)  BP: 137/62 (2025 10:55) (124/56 - 146/66)  BP(mean): 89 (2025 10:55) (81 - 95)  RR: 34 (2025 11:00) (5 - 34)  SpO2: 92% (2025 11:00) (91% - 100%)    Parameters below as of 2025 18:00  Patient On (Oxygen Delivery Method): room air        PHYSICAL EXAM:  GENERAL: In no apparent distress, well nourished, and hydrated.  NECK: Supple, No JVD   HEART: Regular rate and rhythm; No murmurs, rubs, or gallops.  PULMONARY: Clear to auscultation and perfusion.  No rales, wheezing, or rhonchi bilaterally.  EXTREMITIES:  2+ Peripheral Pulses, no LE edema BL  NEUROLOGICAL: Grossly nonfocal      INTERPRETATION OF TELEMETRY: NSR 78 bpm    ECG:  < from: 12 Lead ECG (25 @ 14:41) >  Ventricular Rate 88 BPM    Atrial Rate 468 BPM    QRS Duration 96 ms    Q-T Interval 356 ms    QTC Calculation(Bazett) 430 ms    P Axis 92 degrees    R Axis 97 degrees    T Axis 116 degrees    Diagnosis Line Normal sinus rhythm  Rightward axis  ST & T wave abnormality, consider inferior ischemia  Abnormal ECG    Confirmed by Wenceslao Dixon ([id number]) on 2025 12:00:13 AM    < end of copied text >      I&O's Detail    2025 07:01  -  2025 07:00  --------------------------------------------------------  IN:    Heparin Infusion: 206 mL    NiCARdipine: 62.5 mL    Oral Fluid: 840 mL  Total IN: 1108.5 mL    OUT:    Indwelling Catheter - Urethral (mL): 815 mL  Total OUT: 815 mL    Total NET: 293.5 mL      2025 07:01  -  2025 11:27  --------------------------------------------------------  IN:  Total IN: 0 mL    OUT:    Indwelling Catheter - Urethral (mL): 65 mL  Total OUT: 65 mL    Total NET: -65 mL          LABS:                        10.5   13.14 )-----------( 207      ( 2025 04:24 )             32.2     06-03    139  |  105  |  36[H]  ----------------------------<  178[H]  4.3   |  21  |  1.8[H]    Ca    8.5      2025 04:24  Phos  3.9     06-03  Mg     2.1     06-03    TPro  5.9[L]  /  Alb  3.3[L]  /  TBili  0.6  /  DBili  x   /  AST  16  /  ALT  20  /  AlkPhos  98  06-03        PTT - ( 2025 06:40 )  PTT:>200.0 sec  Urinalysis Basic - ( 2025 04:24 )    Color: x / Appearance: x / SG: x / pH: x  Gluc: 178 mg/dL / Ketone: x  / Bili: x / Urobili: x   Blood: x / Protein: x / Nitrite: x   Leuk Esterase: x / RBC: x / WBC x   Sq Epi: x / Non Sq Epi: x / Bacteria: x      BNP  I&O's Detail    2025 07:01  -  2025 07:00  --------------------------------------------------------  IN:    Heparin Infusion: 206 mL    NiCARdipine: 62.5 mL    Oral Fluid: 840 mL  Total IN: 1108.5 mL    OUT:    Indwelling Catheter - Urethral (mL): 815 mL  Total OUT: 815 mL    Total NET: 293.5 mL      2025 07:01  -  2025 11:28  --------------------------------------------------------  IN:  Total IN: 0 mL    OUT:    Indwelling Catheter - Urethral (mL): 65 mL  Total OUT: 65 mL    Total NET: -65 mL        Daily     Daily Weight in k.3 (2025 06:00)    RADIOLOGY & ADDITIONAL STUDIES:
HISTORY OF PRESENT ILLNESS:  69yoM PMHx DM, HTN, HLD, previous CABG 1/2024 in [place], [facility] for acute shortness of breath. Patient reports starting to have shortness of breath at about 9pm last night, got acutely worse after he went to bed and woke up at about 1am and called 911. EMS report he was satting 50s% on room air, very short of breath with BP 270s/120s. Patient improved significantly after sublingual nitro x3 and placed on BIPAP. Patient denies any chest pain, recent fever or falls, nausea, vomiting, diarrhea, leg swelling or recent medication changes    PAST MEDICAL & SURGICAL HISTORY      FAMILY HISTORY:  FAMILY HISTORY:      SOCIAL HISTORY:  Social History:      ALLERGIES:  No Known Allergies      MEDICATIONS:  nitroglycerin  Infusion 80 MICROgram(s)/Min (24 mL/Hr) IV Continuous <Continuous>    PRN:      HOME MEDICATIONS:  Home Medications:      VITALS:   T(F): 97.4 (06-01 @ 01:59), Max: 97.4 (06-01 @ 01:59)  HR: 58 (06-01 @ 02:35) (56 - 74)  BP: 155/57 (06-01 @ 02:35) (155/57 - 189/86)  BP(mean): --  RR: 24 (06-01 @ 02:25) (20 - 28)  SpO2: 99% (06-01 @ 02:25) (97% - 100%)    I&O's Summary      REVIEW OF SYSTEMS:  CONSTITUTIONAL: No weakness, fevers or chills  HEENT: No visual changes, neck/ear pain  RESPIRATORY: SOB  CARDIOVASCULAR: See HPI      PHYSICAL EXAM:  *General: Not in distress.  Non-toxic appearing.   *HEENT: EOMI  *Cardio: regular, S1, S2, no murmur  *Pulm: B/L BS.  No wheezing / crackles / rales  *Abdomen: Soft, non-tender, non-distended. Normoactive bowel sounds  *Extremities: No edema b/l le. Warm. Knee caps warm. Pulses + bilaterally. Normal capillary refill.   *Neuro: A&O x3. No focal deficits    LABS:                        11.9   10.42 )-----------( 195      ( 01 Jun 2025 01:27 )             38.5     06-01    138  |  103  |  27[H]  ----------------------------<  295[H]  5.4[H]   |  24  |  1.7[H]    Ca    9.1      01 Jun 2025 01:27    TPro  6.9  /  Alb  4.1  /  TBili  0.2  /  DBili  x   /  AST  92[H]  /  ALT  52[H]  /  AlkPhos  156[H]  06-01              Troponin trend:  21          IMAGING:  - CXR:  b/l congestion     ECG:  small q waves in inferior leads  biphasic Tw in v2,v3    TELEMETRY EVENTS:

## 2025-06-03 NOTE — DISCHARGE NOTE NURSING/CASE MANAGEMENT/SOCIAL WORK - FINANCIAL ASSISTANCE
[facility] provides services at a reduced cost to those who are determined to be eligible through [facility]’s financial assistance program. Information regarding [facility]’s financial assistance program can be found by going to [email or web address] or by calling [phone].

## 2025-06-03 NOTE — PROGRESS NOTE ADULT - SUBJECTIVE AND OBJECTIVE BOX
24H events:    Patient is a 69y old Male who presents with a chief complaint of htn emergency (2025 06:52)    Primary diagnosis of Flash pulmonary edema       Today is hospital day 2d. This morning patient was seen and examined at bedside, resting comfortably in bed. Pt went into new onset afib overnight rate controlled. Continues to be on heparin gtt.     PAST MEDICAL & SURGICAL HISTORY    ALLERGIES:  No Known Allergies    MEDICATIONS:  STANDING MEDICATIONS  albuterol/ipratropium for Nebulization 3 milliLiter(s) Nebulizer every 6 hours  aspirin  chewable 81 milliGRAM(s) Oral daily  atorvastatin 80 milliGRAM(s) Oral at bedtime  carvedilol 6.25 milliGRAM(s) Oral every 12 hours  chlorhexidine 2% Cloths 1 Application(s) Topical <User Schedule>  dextrose 5%. 1000 milliLiter(s) IV Continuous <Continuous>  dextrose 5%. 1000 milliLiter(s) IV Continuous <Continuous>  dextrose 50% Injectable 25 Gram(s) IV Push once  dextrose 50% Injectable 12.5 Gram(s) IV Push once  dextrose 50% Injectable 25 Gram(s) IV Push once  glucagon  Injectable 1 milliGRAM(s) IntraMuscular once  heparin  Infusion.  Unit(s)/Hr IV Continuous <Continuous>  hydrALAZINE 100 milliGRAM(s) Oral three times a day  insulin glargine Injectable (LANTUS) 9 Unit(s) SubCutaneous at bedtime  insulin lispro (ADMELOG) corrective regimen sliding scale   SubCutaneous three times a day before meals  insulin lispro Injectable (ADMELOG) 3 Unit(s) SubCutaneous three times a day before meals  isosorbide   dinitrate Tablet (ISORDIL) 10 milliGRAM(s) Oral three times a day  niCARdipine Infusion 2.5 mG/Hr IV Continuous <Continuous>  NIFEdipine XL 60 milliGRAM(s) Oral daily  nitroglycerin  Infusion 80 MICROgram(s)/Min IV Continuous <Continuous>  polyethylene glycol 3350 17 Gram(s) Oral two times a day  senna 2 Tablet(s) Oral at bedtime    PRN MEDICATIONS  acetaminophen     Tablet .. 650 milliGRAM(s) Oral every 6 hours PRN  aluminum hydroxide/magnesium hydroxide/simethicone Suspension 30 milliLiter(s) Oral every 4 hours PRN  dextrose Oral Gel 15 Gram(s) Oral once PRN  heparin   Injectable 8000 Unit(s) IV Push every 6 hours PRN  heparin   Injectable 4000 Unit(s) IV Push every 6 hours PRN  melatonin 3 milliGRAM(s) Oral at bedtime PRN    VITALS:   T(F): 97.7  HR: 108  BP: 144/64  RR: 22  SpO2: 98%    PHYSICAL EXAM:  GENERAL: NAD, well-groomed, well-developed  HEAD:  Atraumatic, Normocephalic  EYES: EOMI  NECK: Supple  NERVOUS SYSTEM:  Alert & Oriented X3, non focal   CHEST/LUNG: Clear to auscultation bilaterally; No rales, rhonchi, wheezing, or rubs  HEART: Regular rate and rhythm; No murmurs, rubs, or gallops  ABDOMEN: Soft, Nontender, Nondistended; Bowel sounds present  EXTREMITIES:  2+ Peripheral Pulses, No clubbing, cyanosis, or edema  LYMPH: No lymphadenopathy noted  SKIN: No rashes or lesions  LABS:                        10.5   13.14 )-----------( 207      ( 2025 04:24 )             32.2     06-02    138  |  105  |  33[H]  ----------------------------<  247[H]  4.4   |  23  |  1.9[H]    Ca    8.4      2025 05:40  Phos  3.2     06-02  Mg     2.2     06-02    TPro  5.8[L]  /  Alb  3.4[L]  /  TBili  0.6  /  DBili  x   /  AST  20  /  ALT  26  /  AlkPhos  106  06-02    PTT - ( 2025 00:05 )  PTT:113.1 sec  Urinalysis Basic - ( 2025 12:35 )    Color: Dark Yellow / Appearance: Cloudy / S.019 / pH: x  Gluc: x / Ketone: x  / Bili: Negative / Urobili: 1.0 mg/dL   Blood: x / Protein: 300 mg/dL / Nitrite: Negative   Leuk Esterase: Small / RBC: 27 /HPF / WBC 11 /HPF   Sq Epi: x / Non Sq Epi: 1 /HPF / Bacteria: Moderate /HPF       24H events:    Patient is a 69y old Male who presents with a chief complaint of htn emergency (2025 06:52)    Primary diagnosis of Flash pulmonary edema       Today is hospital day 2d. This morning patient was seen and examined at bedside, resting comfortably in bed. Pt went into new onset afib overnight rate controlled. Continues to be on heparin gtt.     PAST MEDICAL & SURGICAL HISTORY    SOCIAL HISTORY  -Former smoker  -Current marijuana use 3 per day    ALLERGIES:  No Known Allergies    MEDICATIONS:  STANDING MEDICATIONS  albuterol/ipratropium for Nebulization 3 milliLiter(s) Nebulizer every 6 hours  aspirin  chewable 81 milliGRAM(s) Oral daily  atorvastatin 80 milliGRAM(s) Oral at bedtime  carvedilol 6.25 milliGRAM(s) Oral every 12 hours  chlorhexidine 2% Cloths 1 Application(s) Topical <User Schedule>  dextrose 5%. 1000 milliLiter(s) IV Continuous <Continuous>  dextrose 5%. 1000 milliLiter(s) IV Continuous <Continuous>  dextrose 50% Injectable 25 Gram(s) IV Push once  dextrose 50% Injectable 12.5 Gram(s) IV Push once  dextrose 50% Injectable 25 Gram(s) IV Push once  glucagon  Injectable 1 milliGRAM(s) IntraMuscular once  heparin  Infusion.  Unit(s)/Hr IV Continuous <Continuous>  hydrALAZINE 100 milliGRAM(s) Oral three times a day  insulin glargine Injectable (LANTUS) 9 Unit(s) SubCutaneous at bedtime  insulin lispro (ADMELOG) corrective regimen sliding scale   SubCutaneous three times a day before meals  insulin lispro Injectable (ADMELOG) 3 Unit(s) SubCutaneous three times a day before meals  isosorbide   dinitrate Tablet (ISORDIL) 10 milliGRAM(s) Oral three times a day  niCARdipine Infusion 2.5 mG/Hr IV Continuous <Continuous>  NIFEdipine XL 60 milliGRAM(s) Oral daily  nitroglycerin  Infusion 80 MICROgram(s)/Min IV Continuous <Continuous>  polyethylene glycol 3350 17 Gram(s) Oral two times a day  senna 2 Tablet(s) Oral at bedtime    PRN MEDICATIONS  acetaminophen     Tablet .. 650 milliGRAM(s) Oral every 6 hours PRN  aluminum hydroxide/magnesium hydroxide/simethicone Suspension 30 milliLiter(s) Oral every 4 hours PRN  dextrose Oral Gel 15 Gram(s) Oral once PRN  heparin   Injectable 8000 Unit(s) IV Push every 6 hours PRN  heparin   Injectable 4000 Unit(s) IV Push every 6 hours PRN  melatonin 3 milliGRAM(s) Oral at bedtime PRN    VITALS:   T(F): 97.7  HR: 108  BP: 144/64  RR: 22  SpO2: 98%    PHYSICAL EXAM:  GENERAL: NAD, well-groomed, well-developed  HEAD:  Atraumatic, Normocephalic  EYES: EOMI  NECK: Supple  NERVOUS SYSTEM:  Alert & Oriented X3, non focal   CHEST/LUNG: Clear to auscultation bilaterally; No rales, rhonchi, wheezing, or rubs  HEART: Regular rate and irregular rhythm; No murmurs, rubs, or gallops  ABDOMEN: Soft, Nontender, Nondistended; Bowel sounds present  EXTREMITIES:  2+ Peripheral Pulses, No clubbing, cyanosis, or edema  LYMPH: No lymphadenopathy noted  SKIN: No rashes or lesions  LABS:                        10.5   13.14 )-----------( 207      ( 2025 04:24 )             32.2     06-02    138  |  105  |  33[H]  ----------------------------<  247[H]  4.4   |  23  |  1.9[H]    Ca    8.4      2025 05:40  Phos  3.2     06-02  Mg     2.2     06-02    TPro  5.8[L]  /  Alb  3.4[L]  /  TBili  0.6  /  DBili  x   /  AST  20  /  ALT  26  /  AlkPhos  106  06-02    PTT - ( 2025 00:05 )  PTT:113.1 sec  Urinalysis Basic - ( 2025 12:35 )    Color: Dark Yellow / Appearance: Cloudy / S.019 / pH: x  Gluc: x / Ketone: x  / Bili: Negative / Urobili: 1.0 mg/dL   Blood: x / Protein: 300 mg/dL / Nitrite: Negative   Leuk Esterase: Small / RBC: 27 /HPF / WBC 11 /HPF   Sq Epi: x / Non Sq Epi: 1 /HPF / Bacteria: Moderate /HPF

## 2025-06-03 NOTE — CONSULT NOTE ADULT - NS ATTEND AMEND GEN_ALL_CORE FT
PAF  Elevated CHADSVASc score  Admitted for HTN emergency and acute pulmonary edema  Eliquis 5 mg q12h  Beta-blockers  MCOT on discharge

## 2025-06-03 NOTE — CHART NOTE - NSCHARTNOTEFT_GEN_A_CORE
Patient developed new onset afib. Patient denied ever having a history of afib. He is already on heparin drip for NSTEMI and so will continue the drip. Patient is also on coreg already and so can be used as rate control

## 2025-06-03 NOTE — DIETITIAN INITIAL EVALUATION ADULT - OTHER INFO
Pertinent Medical Information: Per H&P, pt is a 68 y/o male w/ PMHx of DM, HTN, HLD, previous CABG 2024 admitted for flash pulmonary edema and CHF. Hospital course complicated by new onset AFib rate controlled (06/02 overnight). BIBEMS for acute shortness of breath.   # AHRF 2/2 flash pulmonary edema  # Hypertensive emergency   # New onset Afib 06/02 overnight  # Elevated troponin - Wellen's syndrome  # WALT vs. CKD   # Hyperkalemia   # Suspected URTI  # DM2 - hold home metformin    Pertinent Subjective Information: Pt reports having poor appetite; consumed a few bites of fruit this morning. Pt states he prefers to drink only coffee for breakfast. No chewing or swallowing difficulties reported. No nausea or vomiting reported.     Weight hx: #/90 KG - checked 1 month ago per pt. Current dosing weight is 97 KG. 7% unintentional weight gain in 1 month compared to current dosing weight. Pt does not meet weight criteria for malnutrition at this time.

## 2025-06-03 NOTE — DISCHARGE NOTE PROVIDER - NSDCMRMEDTOKEN_GEN_ALL_CORE_FT
amLODIPine 10 mg oral tablet: 1 tab(s) orally once a day  aspirin 81 mg oral tablet: 1 tab(s) orally once a day  atorvastatin 40 mg oral tablet: 1 tab(s) orally once a day (at bedtime)  lisinopril 10 mg oral tablet: 1 tab(s) orally once a day  metFORMIN:   metoprolol succinate 25 mg oral tablet, extended release: 1 tab(s) orally once a day   aspirin 81 mg oral tablet: 1 tab(s) orally once a day  metFORMIN:    apixaban 5 mg oral tablet: 1 tab(s) orally every 12 hours  aspirin 81 mg oral tablet: 1 tab(s) orally once a day  atorvastatin 80 mg oral tablet: 1 tab(s) orally once a day (at bedtime)  carvedilol 6.25 mg oral tablet: 1 tab(s) orally every 12 hours  metFORMIN: 2 times a day Resume metformin on Friday, 6/6/2025  NIFEdipine 90 mg oral tablet, extended release: 1 tab(s) orally once a day

## 2025-06-03 NOTE — DISCHARGE NOTE PROVIDER - CARE PROVIDERS DIRECT ADDRESSES
[email or web address]

## 2025-06-03 NOTE — PHARMACOTHERAPY INTERVENTION NOTE - COMMENTS
Ibutilide 1mg IV piggy bag once, stat was placed by dr. Mcclelland and dr. Coronel at the same time. One order was discontinued.
apixaban 5mg q 12 hours, stat was placed by dr. Mcclelland and dr. Coronel at the same time. One order was discontinued.
Dofetilide 1000mcg once was ordered. Md was contacted to clarify dose since there was no indiction for such a high dose. In addition patient had impaired renal function. It was recommended to change to 250mcg every 12 hours if needed to be initiated.

## 2025-06-03 NOTE — DISCHARGE NOTE NURSING/CASE MANAGEMENT/SOCIAL WORK - NSDCFUADDAPPT_GEN_ALL_CORE_FT
APPTS ARE READY TO BE MADE: [ ] YES    Best Family or Patient Contact (if needed):    Additional Information about above appointments (if needed):    1: Dr. Behuria cardiologist  2: Dr. Santiago EP 4 wks  3: Dr. Braga 2 wks  4: Dr. Traylor 1 wk    Other comments or requests:

## 2025-06-03 NOTE — DISCHARGE NOTE PROVIDER - NSDCCPCAREPLAN_GEN_ALL_CORE_FT
PRINCIPAL DISCHARGE DIAGNOSIS  Diagnosis: Flash pulmonary edema  Assessment and Plan of Treatment: You were brought to the hospital by ambulance for sudden and severe shortness of breath. EMS reported your oxygen saturation was in the 50s on room air and your blood pressure was very high, in the 270s/120s. During this hospitalization, you were treated for acute heart failure that led to flash pulmonary edema and a hypertensive emergency. 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 This narrowing puts you at high risk for a major heart attack soon if the artery becomes fully blocked), which you were managed for. You also developed a new onset of atrial fibrillation, which resolved on its own. You were started on Eliquis for stroke prevention, and electrophysiologists placed a monitor for your heart rhythms prior to dischrge. Chemical cardioversion was considered but was not needed as your heart rhythm returned to normal after magnesium administration.  Workup of your hypertension was done. You also experienced some kidney issues with elevated creatinine and potassium levels, diagnosed as acute kidney injury; you aren't to continue certain blood pressure medications on discharge for this reason. You also had a suspected upper respiratory tract infection with a productive cough and some findings on your chest x-ray, but your cough was improving by the time of discharge. You'll need several follow-up appointments after discharge, including with a pulmonologist for a sleep study to evaluate for sleep apnea, with Dr. Behuria to discuss the results of your hypertension workup, and with Dr. Santiago for your paroxysmal atrial fibrillation.  Please take medicines as prescribed. If you experience worsening of symptoms from baseline, please call 911 and come to hospital immediately. Have a low salt diet, limit caffeine intake, and stop marijuana use.     PRINCIPAL DISCHARGE DIAGNOSIS  Diagnosis: Flash pulmonary edema  Assessment and Plan of Treatment: You were brought to the hospital by ambulance for sudden and severe shortness of breath. EMS reported your oxygen saturation was in the 50s on room air and your blood pressure was very high, in the 270s/120s. During this hospitalization, you were treated for acute heart failure that led to flash pulmonary edema and a hypertensive emergency. 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 This narrowing puts you at high risk for a major heart attack soon if the artery becomes fully blocked), which you were managed for. You also developed a new onset of atrial fibrillation, which resolved on its own. You were started on Eliquis for stroke prevention, and electrophysiologists placed a monitor for your heart rhythms prior to dischrge. Chemical cardioversion was considered but was not needed as your heart rhythm returned to normal after magnesium administration.Workup of your hypertension was done. You also experienced some kidney issues with elevated creatinine and potassium levels, diagnosed as acute kidney injury; you aren't to continue certain blood pressure medications on discharge for this reason. You also had a suspected upper respiratory tract infection with a productive cough and some findings on your chest x-ray, but your cough was impr oving by the time of discharge. You'll need to follow-up with a pulmonologist for a sleep study to evaluate for sleep apnea, with Dr. Behuria to discuss the results of your hypertension workup, and with Dr. Santiago for your paroxysmal atrial fibrillation.Please follow-up with your PCP to have a BMP (basic metabolic panel) done within 1 week. Please take medicines as prescribed. If you experience worsening of symptoms from baseline, please call 911 and come to hospital immediately. Have a low salt diet, limit caffeine intake, and stop marijuana use.

## 2025-06-03 NOTE — CONSULT NOTE ADULT - TIME BILLING
Chart, telemetry, imaging review, discussion with teams, diagnosis: PAF, HFpEF, HTN    Lidia Mayes MD  Cardiac Electrophysiology

## 2025-06-03 NOTE — DISCHARGE NOTE PROVIDER - HOSPITAL COURSE
HPI  69yoM PMHx DM, HTN, HLD, previous CABG 2024, BIBEMS for acute shortness of breath. Patient reports starting to have shortness of breath at about 9pm last night, got acutely worse after he went to bed and woke up at about 1am and called 911. EMS report he was satting 50s% on room air, very short of breath with BP 270s/120s. Patient improved significantly after sublingual nitro x3 and placed on BIPAP and nitro gtt, given Lasix 40 mg IVP x 2 after which BP normalized. Patient denies any chest pain, recent fever or falls, nausea, vomiting, diarrhea, leg swelling or recent medication changes. He follows cardiologist Dr. Casey    T(F): 97.4, Max: 97.4 (01:59)  HR: 58 (56 - 74)  BP: 155/57 (155/57 - 189/86)  RR: 24 (20 - 28)  SpO2: 99% (97% - 100%) on 50% BiPAP   LABS                           11.9   10.42 )-----------( 195    MCV 90.6                 38.5     138  |  103  |  27  ----------------------------( 295      AG: x   5.4   |  24  |  1.7 (baseline 0.9 in 2019)    Ca: 9.1   Phos: x    Mg: x     Protein, Total: 6.9 / Albumin: 4.1 /  T. Bili 0.2 / D. Bili x  /  AST 92 /  ALT 52 /      VBG pH: 7.20  /  pCO2: 68    /  pO2: 13    / HCO3: 27    /  Lactate: 3.5          Trop 21       IMAGING  ECG NSR  CXR appears congested w/ bilateral infiltrates   Admitted to medicine for further management of hypertensive emergency.    Hospital Course  Patient weaned off nitro and nicardipine drp. Coreg 6.25 mg BID started, procardia increased to 60 mg QD, on isordil 10 mg TID, and hydralazine 100 mg TID. Pt was started on aspirin and heparin gtt for 48 hours of AC given EKG indicative of Wellen's changes; serial EKGs show improvement of T wave changes. Secondary HTN workup sent: renal duplex performed, serum renin:aldosterone, metanephrines sent, TSH wnl 0.72. Patient developed new onset Afib 06/02 overnight, rate controlled (<110 bpm however patient on coreg). Given CHADVASc score 4 and HASBLED score 3, transitioned to eliquis. Patient was planned for chemical cardioversion with ibutilide however post magnesium administration, patient went back into NSR. EP brought onboard for MCOT placement prior to discharge.     Labs and vitals stable. Patient is cleared for OP follow up with pulmonology for sleep study, ROMINA workup, Dr. Behuria for secondary hypertension workup follow up and management, and Dr. Santiago for pAfib management. Case discussed with Dr. Bonilla.     #AHRF 2/2 flash pulmonary edema  #Hypertensive emergency   #New onset Afib 06/02 overnight, self resolved -> pAfib  #Elevated troponin - Wellen's syndrome  #Hx CABG 2024, 4x grafts  #HTN/HLD  - trop 21 >> 113 >> 99 >> 62 >> 80  - EKG biphasic T waves V2-V3  - cards: [facility]. [facility] at [facility]  - CXR improved today  -Off NC today, comfortable on RA  - Afib 11PM 06/02 onwards, rate controlled  - off nitro gtt, off nicardipine gtt 06/02  - C/w coreg 6.25 mg BID, on discharge tentatively increase  - Bumex discontinued 06/02 given WALT and collapsible IVC (today 1.2 cm collapsible)  - c/w atorvastatin 80mg qhs  - c/w asa 81mg qd  - c/w hydralazine 100mg TID  - c/w isordil 10 mg TID  - c/w procardia 60 mg QD, tentatively increase on discharge  - A1c 6.5  - TSH 0.72  - Lipid profile notable for cholesterol 203 and   - Echo 06/01/25 EF 60-65%, mild MR/TR/AR/HI, normal global systolic function   - fluid restriction 1.5L, strict I/O, daily weight   - Chemical cardioversion with ibutilide today; precede with MgSO4 2g -> self converted to NSR post magnesium, ibutilide not needed  - dc heparin gtt 06/03 AM, start eliquis (CHADVAS 4, HASBLED 3)  -EP onboard for MCOT placement prior to discharge, OP follow up with Dr. Santiago  - Secondary HTN workup sent: renal duplex performed, renin:aldosterone ratio, metanephrines received -> OP follow up with Dr. 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

## 2025-06-04 NOTE — CHART NOTE - NSCHARTNOTEFT_GEN_A_CORE
emailed Yesi/ July 6/4- AC / appt scheduled with Dr. LINARES at [address] [place] [address] on 6/12 at 10:30AM 6/4 - DK (PCP Referral) emailed Yesi/ July - AC / appt scheduled with Dr. LINARES at [address] [place] [address] on  at 10:30AM  - DK (PCP Referral)  emailed Pulmo - AC/ as per Pulmo, PT IS IN THE HOSP -  inquiry sent - AC / as per pulmo pt is   -  (Pulmo Referral)

## 2025-06-04 NOTE — CHART NOTE - NSCHARTNOTESELECT_GEN_ALL_CORE
Electrophysiology PA
New Onset Afib/Event Note
Transfer Note
Non-Clinical Appointment Info/Event Note

## 2025-06-06 NOTE — H&P ADULT - HISTORY OF PRESENT ILLNESS
Patient is a 69 year old male with PMH of DM, HTN, HLD, and CAD (CABG 2024) presenting post-cardiac arrest. Per EMS, 911 was called by family after patient had a witnessed cardiac arrest at home; unclear if bystander CPR was initiated. EMS states they arrived to the home shortly thereafter (approximately 5 minutes); initial rhythm PEA, patient intubated in the field, and ROSC after two rounds of CPR with Epi x2 given. No further history obtained by family.     Of note, patient was discharged from the hospital two days ago after experiencing an acute flash pulmonary event and hypertensive emergency.    In ED:  /73, HR 66, Temp 95.5, Intubated  wbc 13.4  hb 11.6  plt 232  Na 139, K 5.4  Crea 1.8 (at baseline)   AG 19  Troponin 100   AST 98, ALT 66  Lactate 5.5     ECG: sinus latoya with nonspecific ST changes         Patient is a 69 year old male with PMH of DM, HTN, HLD, and CAD (CABG 2024) presenting post-cardiac arrest. Per EMS, 911 was called by family after patient had a witnessed cardiac arrest at home; unclear if bystander CPR was initiated. EMS states they arrived to the home shortly thereafter (approximately 5 minutes); initial rhythm PEA, patient intubated in the field, and ROSC after two rounds of CPR with Epi x2 given. No further history obtained by family.     Of note, patient was discharged from the hospital two days ago after experiencing an acute flash pulmonary event and hypertensive emergency.    In ED:  /73, HR 66, Temp 95.5, Intubated  wbc 13.4  hb 11.6  plt 232  Na 139, K 5.4  Crea 1.8 (at baseline)   AG 19  Troponin 100   AST 98, ALT 66  Lactate 5.5     ECG: sinus latoya with nonspecific ST changes     CT HEAD:  No evidence of acute intracranial pathology.    CT CERVICAL SPINE:  No evidence of acute cervical spine traumatic injury.      Patient is a 69 year old male with PMH of DM, HTN, HLD, and CAD (CABG 2024) presenting post-cardiac arrest. Per EMS, 911 was called by family after patient had a witnessed cardiac arrest at home; EMS states they arrived to the home shortly thereafter (approximately 5 minutes); initial rhythm PEA, patient intubated in the field, and ROSC after two rounds of CPR with Epi x2 given. No further history obtained by family.     Of note, patient was discharged from the hospital two days ago after experiencing an acute flash pulmonary event and hypertensive emergency.    In ED:  /73, HR 66, Temp 95.5, Intubated  wbc 13.4  hb 11.6  plt 232  Na 139, K 5.4  Crea 1.8 (at baseline)   AG 19  Troponin 100   AST 98, ALT 66  Lactate 5.5     ECG: sinus latoya with nonspecific ST changes     CT HEAD:  No evidence of acute intracranial pathology.    CT CERVICAL SPINE:  No evidence of acute cervical spine traumatic injury.    CT chest abdomen pelvis:    1.  No evidence for central or segmental pulmonary emboli.  2.  Left lower lobe consolidation, which may represent pneumonia in appropriate clinical setting.  3.  Mucosal hyperemia and mild mural thickening of the ascending, transverse, descending colon, possibly reflecting sequelae of hypoperfusion.  4.  Moderate right and small left pleural effusions.  5.  Mildly displaced left L3 transverse process fracture.  6.  Nondisplaced fractures of right ribs 3 through 6. Nondisplaced fractures of left ribs 4 through 6.  7.  Focus of air within the bladder. Correlate for history of instrumentation.      Patient is a 69 year old male with PMH of DM, HTN, HLD, and CAD (CABG 2024) presenting post-cardiac arrest. Per EMS, 911 was called by family after patient had a witnessed cardiac arrest at home; EMS states they arrived to the home shortly thereafter (approximately 5 minutes); initial rhythm PEA, patient intubated in the field, and ROSC after two rounds of CPR with Epi x2 given.   Spoke with wife, patient was fine all day, before he had the arrest he was lying down, complained of SOB them passed out.     Of note, patient was discharged from the hospital two days ago after experiencing an acute flash pulmonary event and hypertensive emergency.    In ED:  /73, HR 66, Temp 95.5, Intubated  wbc 13.4  hb 11.6  plt 232  Na 139, K 5.4  Crea 1.8 (at baseline)   AG 19  Troponin 100   AST 98, ALT 66  Lactate 5.5     ECG: sinus latoya with nonspecific ST changes     CT HEAD:  No evidence of acute intracranial pathology.    CT CERVICAL SPINE:  No evidence of acute cervical spine traumatic injury.    CT chest abdomen pelvis:    1.  No evidence for central or segmental pulmonary emboli.  2.  Left lower lobe consolidation, which may represent pneumonia in appropriate clinical setting.  3.  Mucosal hyperemia and mild mural thickening of the ascending, transverse, descending colon, possibly reflecting sequelae of hypoperfusion.  4.  Moderate right and small left pleural effusions.  5.  Mildly displaced left L3 transverse process fracture.  6.  Nondisplaced fractures of right ribs 3 through 6. Nondisplaced fractures of left ribs 4 through 6.  7.  Focus of air within the bladder. Correlate for history of instrumentation.

## 2025-06-06 NOTE — CONSULT NOTE ADULT - ASSESSMENT
EP: Dr. Mayes    69 year old male with PMH of DM, HTN, HLD, and CAD (CABG 2024) presenting post-cardiac arrest. Per EMS, 911 was called by family after patient had a witnessed cardiac arrest at home; EMS states they arrived to the home shortly thereafter (approximately 5 minutes); initial rhythm PEA, patient intubated in the field, and ROSC after two rounds of CPR with Epi x2 given.  Before he had the arrest he was lying down, complained of SOB them passed out. Of note, patient was discharged from the hospital two days ago after experiencing an acute flash pulmonary event and hypertensive emergency    EP: EP saw the pt on June 3rd for new onset Afib.  We put an MCOT on him on discharge for afib burden. Afib report reviewed.  No events leading up to his cardiac arrest.  Pt is intubated and on Propofol and Fentanyl.  On tele, he has been in Sinus bradycardia with HR in the 40s.  RN decreased Propofol and HR is now 52.  CT negative for PE    Impression:  PEA cardiac arrest  PAfib  HTN  DM  CAD/CABG    Plan:  No events on MCOT prior to cardiac arrest  Sinus Dre on tele - pt is on sedation  Agree with holding BB  Continue workup for PEA arrest per primary team  No indication for PPM at this time  (EP attending to see pt) Cards: Dr. Casey  EP: Dr. Mayes    69 year old male with PMH of DM, HTN, HLD, and CAD (CABG 2024) presenting post-cardiac arrest. Per EMS, 911 was called by family after patient had a witnessed cardiac arrest at home; EMS states they arrived to the home shortly thereafter (approximately 5 minutes); initial rhythm PEA, patient intubated in the field, and ROSC after two rounds of CPR with Epi x2 given.  Before he had the arrest he was lying down, complained of SOB them passed out. Of note, patient was discharged from the hospital two days ago after experiencing an acute flash pulmonary event and hypertensive emergency    EP: EP saw the pt on June 3rd for new onset Afib.  We put an MCOT on him on discharge for afib burden. Afib report reviewed.  No events leading up to his cardiac arrest.  Pt is intubated and on Propofol and Fentanyl.  On tele, he has been in Sinus bradycardia with HR in the 40s.  RN decreased Propofol and HR is now 52.  CT negative for PE    Impression:  PEA cardiac arrest  PAfib  HTN  DM  CAD/CABG    Plan:  No events on MCOT prior to cardiac arrest  Sinus Dre on tele - pt is on sedation  Agree with holding BB  Continue workup for PEA arrest per primary team  No indication for PPM at this time  (EP attending to see pt)

## 2025-06-06 NOTE — PATIENT PROFILE ADULT - FALL HARM RISK - HARM RISK INTERVENTIONS

## 2025-06-06 NOTE — CONSULT NOTE ADULT - NS ATTEND AMEND GEN_ALL_CORE FT
Cardio-pulmonary arrest  Hx of Parox AF    MCOT reviewed (put in chart). No episodes of VT/Asystole/pauses  No EP etiology for cardiac arrest  Considering doing secondary work-up as needed: CT head, CT chest/PE, Echo  No indication for PPM.  d/w ICU fellow at bedside  Recall as needed

## 2025-06-06 NOTE — CONSULT NOTE ADULT - ASSESSMENT
IMPRESSION:    Acute Hypoxemic respiratory failure   SP CPA.  OOH witnessed  Down time around 9 min  Possible aspiration   Bilateral effusions   HO HFPEF with recent admission for HFPEF exacerbation adn HTN emergency  CKD   DM, HTN, HLD, and CAD SP CABG 2024  HO Afib     PLAN:    CNS: EEG noted.  VEEG.  SAT.  Repeat CTH in am.  NSE.  Avoid hyperthermia     HEENT: Oral care.  ET care     PULMONARY:  HOB @ 45 degrees.  Aspiration precautions;  Wean O2.  .  A line.  Monitor airway pressures      CARDIOVASCULAR:  CE.  ECHO.  Maximize cardiac therapy and volume status     GI: GI prophylaxis.  Feeding.  Bowel regimen     RENAL:  Follow up lytes.  Correct as needed.  Monitor UO     INFECTIOUS DISEASE: Follow up cultures.  ZOsyn.  Nasal MRSA negative.      HEMATOLOGICAL:  DVT prophylaxis. Monitor CBC and coags.  Continue IV heparin.        ENDOCRINE:  Follow up FS.  Insulin protocol if needed.      MUSCULOSKELETAL:  Bed rest.  Off loading     MICU

## 2025-06-06 NOTE — PATIENT PROFILE ADULT - FUNCTIONAL SCREEN CURRENT LEVEL: SWALLOWING (IF SCORE 2 OR MORE FOR ANY ITEM, CONSULT REHAB SERVICES), MLM)
2 = difficulty swallowing liquids/foods Principal Discharge DX:	 delivery delivered  Goal:	Wound check  Assessment and plan of treatment:	Follow up in the office in 2 weeks for wound check

## 2025-06-06 NOTE — ED ADULT NURSE NOTE - NSFALLRISKINTERV_ED_ALL_ED

## 2025-06-06 NOTE — CONSULT NOTE ADULT - SUBJECTIVE AND OBJECTIVE BOX
Neurology Consult Note     FREDRICK HOFFMAN 69y Male [id number]  Hospital Day:      HPI  Patient is a 69 year old male with PMH of DM, HTN, HLD, and CAD (CABG 2024) presenting post-cardiac arrest. Per EMS, 911 was called by family after patient had a witnessed cardiac arrest at home; EMS states they arrived to the home shortly thereafter (approximately 5 minutes); Neurology consulted for myoclonic jerks.       PMH  Cardiac arrest        Vital Signs  T(F): 98 (16:00), Max: 98 (16:00)  HR: 48 (17:00) (44 - 66)  BP: 131/62 (17:00) (115/58 - 182/79)  RR: 20 (17:00) (14 - 50)  SpO2: 100% (17:00) (89% - 100%)    Neurological Exam: (propofol and fent)   Mental status: Unresponsive to noxious stimuli,   Cranial nerves:   - Eyes: PERRL, VOR not intact, slight abduction of the right eye, no blink to threat,    - Face/ENT: Corneal not intact, ETT in place   Motor/sensation: No withdrawal to pain in all four limbs      Labs:  WBC 9.33 /HGB 8.9 /MCV 89.6 /HCT 28.4 / / 06-06  WBC 9.11 /HGB 9.2 /MCV 89.6 /HCT 29.4 / / 06-06  WBC 10.37 /HGB 8.6 /MCV 89.7 /HCT 27.8 / / 06-06  WBC 13.41 /HGB 11.6 /MCV 89.3 /HCT 36.6 / / 06-06    06-06    133[L]  |  100  |  37[H]  ----------------------------<  437[H]  5.3[H]   |  20  |  1.7[H]    Ca    7.9[L]      06 Jun 2025 08:27  Phos  6.3     06-06  Mg     2.4     06-06    TPro  5.5[L]  /  Alb  3.4[L]  /  TBili  0.3  /  DBili  x   /  AST  45[H]  /  ALT  41  /  AlkPhos  89  06-06    LIVER FUNCTIONS - ( 06 Jun 2025 08:27 )  Alb: 3.4 g/dL / Pro: 5.5 g/dL / ALK PHOS: 89 U/L / ALT: 41 U/L / AST: 45 U/L / GGT: x           PT/INR - ( 06 Jun 2025 01:42 )   PT: 16.50 sec;   INR: 1.39 ratio         PTT - ( 06 Jun 2025 01:42 )  PTT:28.2 sec  Urinalysis Basic - ( 06 Jun 2025 08:27 )    Color: x / Appearance: x / SG: x / pH: x  Gluc: 437 mg/dL / Ketone: x  / Bili: x / Urobili: x   Blood: x / Protein: x / Nitrite: x   Leuk Esterase: x / RBC: x / WBC x   Sq Epi: x / Non Sq Epi: x / Bacteria: x        Medications:  aspirin  chewable 81 milliGRAM(s) Oral daily  atorvastatin 80 milliGRAM(s) Oral at bedtime  chlorhexidine 0.12% Liquid 15 milliLiter(s) Oral Mucosa every 12 hours  chlorhexidine 2% Cloths 1 Application(s) Topical <User Schedule>  dextrose 5%. 1000 milliLiter(s) IV Continuous <Continuous>  dextrose 50% Injectable 25 Gram(s) IV Push once  dextrose 50% Injectable 50 milliLiter(s) IV Push once  dextrose Oral Gel 15 Gram(s) Oral once PRN  fentaNYL   Infusion. 0.5 MICROgram(s)/kG/Hr IV Continuous <Continuous>  glucagon  Injectable 1 milliGRAM(s) IntraMuscular once  heparin   Injectable 7000 Unit(s) IV Push every 6 hours PRN  heparin   Injectable 3500 Unit(s) IV Push every 6 hours PRN  heparin  Infusion.  Unit(s)/Hr IV Continuous <Continuous>  insulin lispro (ADMELOG) corrective regimen sliding scale   SubCutaneous three times a day before meals  levETIRAcetam   Injectable 750 milliGRAM(s) IV Push every 12 hours  piperacillin/tazobactam IVPB.. 3.375 Gram(s) IV Intermittent every 8 hours  polyethylene glycol 3350 17 Gram(s) Oral two times a day PRN  propofol Infusion 0.495 MICROgram(s)/kG/Min IV Continuous <Continuous>  senna 2 Tablet(s) Oral at bedtime  sodium zirconium cyclosilicate 10 Gram(s) Oral every 8 hours      Neuroimaging:  CT Head No Cont:   ACC: [id number] EXAM:  CT CERVICAL SPINE   ORDERED BY: ANDRE COBIAN     ACC: [id number] EXAM:  CT BRAIN   ORDERED BY: ANDRE COBIAN     PROCEDURE DATE:  06/06/2025          INTERPRETATION:  CLINICAL INDICATION: Cardiac arrest. CT head, cervical   spine, chest, abdomen and pelvis requested.    TECHNIQUE: CT of the head and cervical spine was performed without the   administration of intravenous contrast.    COMPARISON: None available..    FINDINGS:    CT HEAD:  Ventricles and corticalsulcal pattern are age appropriate.    No acute intracranial hemorrhage.    Gray-white differentiation is maintained. No evidence of recent large   territorial infarction.    No mass effect, midline shift or evidence of vasogenic edema. Basal   cisterns are patent.    No depressed skull fracture.    Visualized mastoids are clear. Scattered sinus mucosal thickening.    Globes are intact. Lenses are normally located.      CT CERVICAL SPINE:  Vertebral body heights and alignment are maintained.    No acute fracture, compression deformity or facet subluxation.    Dens is intact. Atlantoaxial and atlantooccipital articulations are   maintained.    No thickening of the prevertebral soft tissues.    Multilevel degenerative changes. Findings most pronounced at C5-C6.    Partially imaged endotracheal and enteric tubes.      IMPRESSION:    CT HEAD:  1.  No evidence of acute intracranial pathology.  2.  Specifically no findings of cardiac arrest.    CT CERVICAL SPINE:  1.  No evidence of acute cervical spine traumatic injury.  2.  Specifically no findings of cardiac arrest.    --- End of Report ---            EMMA MATTHEWS MD; Attending Radiologist  This document has been electronically signed. Jun 6 2025  3:03AM (06-06-25 @ 01:28)  
Patient is a 69y old  Male who presents with a chief complaint of cardiac arrest (06 Jun 2025 08:31)    HPI:   69 year old male with PMH of DM, HTN, HLD, and CAD (CABG 2024) presenting post-cardiac arrest. Per EMS, 911 was called by family after patient had a witnessed cardiac arrest at home; EMS states they arrived to the home shortly thereafter (approximately 5 minutes); initial rhythm PEA, patient intubated in the field, and ROSC after two rounds of CPR with Epi x2 given.   Spoke with wife, patient was fine all day, before he had the arrest he was lying down, complained of SOB them passed out.     Of note, patient was discharged from the hospital two days ago after experiencing an acute flash pulmonary event and hypertensive emergency    EP: EP saw the pt on June 3rd for new onset Afib.  We put an MCOT on him on discharge for afib burden. Afib report reviewed.  No events leading up to his cardiac arrest.  Pt is intubated and on Propofol and Fentanyl.  On tele, he has been in Sinus bradycardia with HR in the 40s.  RN decreased Propofol and HR is now 52.      PAST MEDICAL & SURGICAL HISTORY:      PREVIOUS DIAGNOSTIC TESTING:      ECHO  FINDINGS:  < from: TTE Echo Complete w/ Contrast w/o Doppler (06.01.25 @ 11:50) >  Summary:   1. Left ventricular ejection fraction, by visual estimation, is 60 to   65%.   2. Normal global left ventricular systolic function.   3. Mildly enlarged left atrium.   4. Normal right atrial size.   5. Mild mitral valve regurgitation.   6. Mild tricuspid regurgitation.   7. Mild to moderate aortic regurgitation.   8. Mild pulmonic valve regurgitation.   9. Endocardial visualization was enhanced with intravenous echo contrast.    < end of copied text >    MEDICATIONS  (STANDING):  aspirin  chewable 81 milliGRAM(s) Oral daily  atorvastatin 80 milliGRAM(s) Oral at bedtime  chlorhexidine 0.12% Liquid 15 milliLiter(s) Oral Mucosa every 12 hours  chlorhexidine 2% Cloths 1 Application(s) Topical <User Schedule>  dextrose 5%. 1000 milliLiter(s) (50 mL/Hr) IV Continuous <Continuous>  dextrose 50% Injectable 25 Gram(s) IV Push once  fentaNYL   Infusion. 0.5 MICROgram(s)/kG/Hr (4.38 mL/Hr) IV Continuous <Continuous>  glucagon  Injectable 1 milliGRAM(s) IntraMuscular once  heparin  Infusion.  Unit(s)/Hr (16 mL/Hr) IV Continuous <Continuous>  insulin lispro (ADMELOG) corrective regimen sliding scale   SubCutaneous three times a day before meals  insulin regular  human recombinant 5 Unit(s) IV Push once  piperacillin/tazobactam IVPB.. 3.375 Gram(s) IV Intermittent every 8 hours  propofol Infusion 0.495 MICROgram(s)/kG/Min (0.26 mL/Hr) IV Continuous <Continuous>  senna 2 Tablet(s) Oral at bedtime    MEDICATIONS  (PRN):  dextrose Oral Gel 15 Gram(s) Oral once PRN Blood Glucose LESS THAN 70 milliGRAM(s)/deciliter  heparin   Injectable 7000 Unit(s) IV Push every 6 hours PRN For aPTT less than 40  heparin   Injectable 3500 Unit(s) IV Push every 6 hours PRN For aPTT between 40 - 57  polyethylene glycol 3350 17 Gram(s) Oral two times a day PRN Constipation      FAMILY HISTORY:  none    SOCIAL HISTORY:  none  CIGARETTES:  none  ALCOHOL:  none  Past Surgical History:    Allergies:    No Known Allergies      REVIEW OF SYSTEMS:    pt is intubated    Vital Signs Last 24 Hrs  T(C): 36.5 (06 Jun 2025 08:00), Max: 36.5 (06 Jun 2025 08:00)  T(F): 97.7 (06 Jun 2025 08:00), Max: 97.7 (06 Jun 2025 08:00)  HR: 58 (06 Jun 2025 09:00) (44 - 66)  BP: 119/56 (06 Jun 2025 09:00) (117/57 - 182/79)  BP(mean): 81 (06 Jun 2025 09:00) (81 - 114)  RR: 50 (06 Jun 2025 09:00) (14 - 50)  SpO2: 99% (06 Jun 2025 09:00) (96% - 100%)    Parameters below as of 06 Jun 2025 07:00  Patient On (Oxygen Delivery Method): ventilator    O2 Concentration (%): 60    PHYSICAL EXAM:    GENERAL: In no apparent distress, well nourished, and hydrated.  HEART: Regular rate and rhythm; No murmurs, rubs, or gallops.  PULMONARY: Clear to auscultation and perfusion.  No rales, wheezing, or rhonchi bilaterally.  ABDOMEN: Soft, Nontender, Nondistended; Bowel sounds present  EXTREMITIES:  2+ Peripheral Pulses, No clubbing, cyanosis, or edema  NEUROLOGICAL: Grossly nonfocal    INTERPRETATION OF TELEMETRY:  Sinus Dre    ECG:  Sinus Dre    LABS:                        8.6    10.37 )-----------( 182      ( 06 Jun 2025 08:27 )             27.8     06-06    133[L]  |  100  |  37[H]  ----------------------------<  437[H]  5.3[H]   |  20  |  1.7[H]    Ca    7.9[L]      06 Jun 2025 08:27  Phos  6.3     06-06  Mg     2.4     06-06    TPro  5.5[L]  /  Alb  3.4[L]  /  TBili  0.3  /  DBili  x   /  AST  45[H]  /  ALT  41  /  AlkPhos  89  06-06        PT/INR - ( 06 Jun 2025 01:42 )   PT: 16.50 sec;   INR: 1.39 ratio         PTT - ( 06 Jun 2025 01:42 )  PTT:28.2 sec  Urinalysis Basic - ( 06 Jun 2025 08:27 )    Color: x / Appearance: x / SG: x / pH: x  Gluc: 437 mg/dL / Ketone: x  / Bili: x / Urobili: x   Blood: x / Protein: x / Nitrite: x   Leuk Esterase: x / RBC: x / WBC x   Sq Epi: x / Non Sq Epi: x / Bacteria: x    
Patient is a 69y old  Male who presents with a chief complaint of cardiac arrest (06 Jun 2025 02:51)      HPI:  Patient is a 69 year old male with PMH of DM, HTN, HLD, and CAD (CABG 2024) presenting post-cardiac arrest. Per EMS, 911 was called by family after patient had a witnessed cardiac arrest at home; EMS states they arrived to the home shortly thereafter (approximately 5 minutes); initial rhythm PEA, patient intubated in the field, and ROSC after two rounds of CPR with Epi x2 given.   Spoke with wife, patient was fine all day, before he had the arrest he was lying down, complained of SOB them passed out.     Of note, patient was discharged from the hospital two days ago after experiencing an acute flash pulmonary event and hypertensive emergency.    In ED:  /73, HR 66, Temp 95.5, Intubated  wbc 13.4  hb 11.6  plt 232  Na 139, K 5.4  Crea 1.8 (at baseline)   AG 19  Troponin 100   AST 98, ALT 66  Lactate 5.5     ECG: sinus latoya with nonspecific ST changes     CT HEAD:  No evidence of acute intracranial pathology.    CT CERVICAL SPINE:  No evidence of acute cervical spine traumatic injury.    CT chest abdomen pelvis:    1.  No evidence for central or segmental pulmonary emboli.  2.  Left lower lobe consolidation, which may represent pneumonia in appropriate clinical setting.  3.  Mucosal hyperemia and mild mural thickening of the ascending, transverse, descending colon, possibly reflecting sequelae of hypoperfusion.  4.  Moderate right and small left pleural effusions.  5.  Mildly displaced left L3 transverse process fracture.  6.  Nondisplaced fractures of right ribs 3 through 6. Nondisplaced fractures of left ribs 4 through 6.  7.  Focus of air within the bladder. Correlate for history of instrumentation.   (06 Jun 2025 02:51)      PAST MEDICAL & SURGICAL HISTORY:      SOCIAL HX:   Smoking                         ETOH                            Other    FAMILY HISTORY:  :  No known cardiovacular family hisotry     Review Of Systems:     All ROS are negative except per HPI       Allergies    No Known Allergies    Intolerances          PHYSICAL EXAM    ICU Vital Signs Last 24 Hrs  T(C): 36.5 (06 Jun 2025 08:00), Max: 36.5 (06 Jun 2025 08:00)  T(F): 97.7 (06 Jun 2025 08:00), Max: 97.7 (06 Jun 2025 08:00)  HR: 45 (06 Jun 2025 08:15) (44 - 66)  BP: 124/58 (06 Jun 2025 08:00) (124/58 - 182/79)  BP(mean): 83 (06 Jun 2025 08:00) (83 - 114)  ABP: --  ABP(mean): --  RR: 20 (06 Jun 2025 08:15) (14 - 26)  SpO2: 100% (06 Jun 2025 08:15) (96% - 100%)    O2 Parameters below as of 06 Jun 2025 07:00  Patient On (Oxygen Delivery Method): ventilator    O2 Concentration (%): 60        CONSTITUTIONAL:  Well nourished.   NAD    ENT:   Airway patent,   Mouth with normal mucosa.   No thrush      CARDIAC:   Normal rate,   Regular rhythm.    No edema      Vascular:   normal systolic impulse  no bruits    RESPIRATORY:   No wheezing  Bilateral BS   Not tachypneic,  No use of accessory muscles    GASTROINTESTINAL:  Abdomen soft,   Non-tender,   No guarding,   + BS      NEUROLOGICAL:   Alert and oriented   No motor deficits.    SKIN:   Skin normal color for race,   No evidence of rash.      HEME LYMPH: .  No cervical  lymphadenopathy.  No inguinal lymphadenopathy            06-05-25 @ 07:01  -  06-06-25 @ 07:00  --------------------------------------------------------  IN:    FentaNYL: 52.6 mL    FentaNYL: 26.3 mL    FentaNYL: 26.3 mL    Heparin Infusion: 32 mL    IV PiggyBack: 550 mL    Propofol: 63.2 mL  Total IN: 750.4 mL    OUT:    Indwelling Catheter - Urethral (mL): 575 mL  Total OUT: 575 mL    Total NET: 175.4 mL          LABS:                          11.6   13.41 )-----------( 232      ( 06 Jun 2025 01:42 )             36.6                                               06-06    139  |  100  |  32[H]  ----------------------------<  253[H]  5.4[H]   |  20  |  1.8[H]    Ca    9.3      06 Jun 2025 01:42  Phos  6.3     06-06  Mg     2.4     06-06    TPro  7.3  /  Alb  4.4  /  TBili  0.6  /  DBili  x   /  AST  98[H]  /  ALT  66[H]  /  AlkPhos  146[H]  06-06      PT/INR - ( 06 Jun 2025 01:42 )   PT: 16.50 sec;   INR: 1.39 ratio         PTT - ( 06 Jun 2025 01:42 )  PTT:28.2 sec                                       Urinalysis Basic - ( 06 Jun 2025 01:42 )    Color: x / Appearance: x / SG: x / pH: x  Gluc: 253 mg/dL / Ketone: x  / Bili: x / Urobili: x   Blood: x / Protein: x / Nitrite: x   Leuk Esterase: x / RBC: x / WBC x   Sq Epi: x / Non Sq Epi: x / Bacteria: x                                                  LIVER FUNCTIONS - ( 06 Jun 2025 01:42 )  Alb: 4.4 g/dL / Pro: 7.3 g/dL / ALK PHOS: 146 U/L / ALT: 66 U/L / AST: 98 U/L / GGT: x                                                                                               Mode: AC/ CMV (Assist Control/ Continuous Mandatory Ventilation)  RR (machine): 20  TV (machine): 400  FiO2: 60  PEEP: 8  ITime: 1  MAP: 11  PIP: 23                                      ABG - ( 06 Jun 2025 06:41 )  pH, Arterial: 7.26  pH, Blood: x     /  pCO2: 58    /  pO2: 89    / HCO3: 26    / Base Excess: -1.5  /  SaO2: 98.3                X-Rays reviewed                                                                                     ECHO    CXR interpreted by me     MEDICATIONS  (STANDING):  aspirin  chewable 81 milliGRAM(s) Oral daily  atorvastatin 80 milliGRAM(s) Oral at bedtime  chlorhexidine 0.12% Liquid 15 milliLiter(s) Oral Mucosa every 12 hours  chlorhexidine 2% Cloths 1 Application(s) Topical <User Schedule>  dextrose 5%. 1000 milliLiter(s) (50 mL/Hr) IV Continuous <Continuous>  dextrose 50% Injectable 25 Gram(s) IV Push once  fentaNYL   Infusion. 0.5 MICROgram(s)/kG/Hr (4.38 mL/Hr) IV Continuous <Continuous>  glucagon  Injectable 1 milliGRAM(s) IntraMuscular once  heparin  Infusion.  Unit(s)/Hr (16 mL/Hr) IV Continuous <Continuous>  insulin lispro (ADMELOG) corrective regimen sliding scale   SubCutaneous three times a day before meals  piperacillin/tazobactam IVPB.. 3.375 Gram(s) IV Intermittent every 8 hours  propofol Infusion 0.5 MICROgram(s)/kG/Min (0.26 mL/Hr) IV Continuous <Continuous>  senna 2 Tablet(s) Oral at bedtime    MEDICATIONS  (PRN):  dextrose Oral Gel 15 Gram(s) Oral once PRN Blood Glucose LESS THAN 70 milliGRAM(s)/deciliter  heparin   Injectable 7000 Unit(s) IV Push every 6 hours PRN For aPTT less than 40  heparin   Injectable 3500 Unit(s) IV Push every 6 hours PRN For aPTT between 40 - 57  polyethylene glycol 3350 17 Gram(s) Oral two times a day PRN Constipation        
CC: cardiac arrest    HPI:     Patient is a 69 year old male with PMH of DM, HTN, HLD, and CAD (CABG 2024) presenting post-cardiac arrest. Per EMS, 911 was called by family after patient had a witnessed cardiac arrest at home; EMS states they arrived to the home shortly thereafter (approximately 5 minutes); initial rhythm PEA, patient intubated in the field, and ROSC after two rounds of CPR with Epi x2 given.   Spoke with wife, patient was fine all day, before he had the arrest he was lying down, complained of SOB them passed out.     Of note, patient was discharged from the hospital two days ago after experiencing an acute flash pulmonary event and hypertensive emergency.    In ED:  /73, HR 66, Temp 95.5, Intubated  wbc 13.4  hb 11.6  plt 232  Na 139, K 5.4  Crea 1.8 (at baseline)   AG 19  Troponin 100   AST 98, ALT 66  Lactate 5.5     ECG: sinus dre with nonspecific ST changes     CT HEAD:  No evidence of acute intracranial pathology.    CT CERVICAL SPINE:  No evidence of acute cervical spine traumatic injury.    CT chest abdomen pelvis:    1.  No evidence for central or segmental pulmonary emboli.  2.  Left lower lobe consolidation, which may represent pneumonia in appropriate clinical setting.  3.  Mucosal hyperemia and mild mural thickening of the ascending, transverse, descending colon, possibly reflecting sequelae of hypoperfusion.  4.  Moderate right and small left pleural effusions.  5.  Mildly displaced left L3 transverse process fracture.  6.  Nondisplaced fractures of right ribs 3 through 6. Nondisplaced fractures of left ribs 4 through 6.  7.  Focus of air within the bladder. Correlate for history of instrumentation.   (06 Jun 2025 02:51)    PERTINENT PM/SXH:   DM, HTN, HLD, and CAD (CABG 2024)    FAMILY HISTORY:    None pertinent    ITEMS NOT CHECKED ARE NOT PRESENT    SOCIAL HISTORY:   Significant other/partner[ ]  Children[ ]  Episcopalian/Spirituality:  Substance hx:  [ ]   Tobacco hx:  [ ]   Alcohol hx: [ ]   Living Situation: [ x]Home  [ ]Long term care  [ ]Rehab [ ]Other  Home Services: [ ] HHA [ ] Leeroy RN [ ] Hospice  Occupation:  Home Opioid hx:  [ ] Y [ ] N [ x] I-Stop Reference No:    Reference #: [id number] - no meds     ADVANCE DIRECTIVES:     [ ] Full Code [ x] DNR  MOLST  [ ]  Living Will  [ ]   DECISION MAKER(s):  [ ] Health Care Proxy(s)  [x ] Surrogate(s)  [ ] Guardian           Name(s): Phone Number(s):  Partner Lori Mayfield       BASELINE (I)ADL(s) (prior to admission):    [place]: [ ]Total  [ ] Moderate [ ]Dependent  Palliative Performance Status Version 2:         %    [email or web address]    Allergies    No Known Allergies    Intolerances    MEDICATIONS  (STANDING):  aspirin  chewable 81 milliGRAM(s) Oral daily  atorvastatin 80 milliGRAM(s) Oral at bedtime  chlorhexidine 0.12% Liquid 15 milliLiter(s) Oral Mucosa every 12 hours  chlorhexidine 2% Cloths 1 Application(s) Topical <User Schedule>  dextrose 5%. 1000 milliLiter(s) (50 mL/Hr) IV Continuous <Continuous>  dextrose 50% Injectable 25 Gram(s) IV Push once  fentaNYL   Infusion. 0.5 MICROgram(s)/kG/Hr (4.38 mL/Hr) IV Continuous <Continuous>  glucagon  Injectable 1 milliGRAM(s) IntraMuscular once  heparin  Infusion.  Unit(s)/Hr (16 mL/Hr) IV Continuous <Continuous>  insulin lispro (ADMELOG) corrective regimen sliding scale   SubCutaneous three times a day before meals  levETIRAcetam   Injectable 750 milliGRAM(s) IV Push every 12 hours  piperacillin/tazobactam IVPB.. 3.375 Gram(s) IV Intermittent every 8 hours  propofol Infusion 0.495 MICROgram(s)/kG/Min (0.26 mL/Hr) IV Continuous <Continuous>  senna 2 Tablet(s) Oral at bedtime  sodium zirconium cyclosilicate 10 Gram(s) Oral every 8 hours    MEDICATIONS  (PRN):  dextrose Oral Gel 15 Gram(s) Oral once PRN Blood Glucose LESS THAN 70 milliGRAM(s)/deciliter  heparin   Injectable 7000 Unit(s) IV Push every 6 hours PRN For aPTT less than 40  heparin   Injectable 3500 Unit(s) IV Push every 6 hours PRN For aPTT between 40 - 57  polyethylene glycol 3350 17 Gram(s) Oral two times a day PRN Constipation    PRESENT SYMPTOMS: [ x]Unable to obtain due to poor mentation   Source if other than patient:  [ ]Family   [ ]Team     Pain: [ ]yes [ ]no  ALL PAIN ASSESSMENT COMPONENTS WERE ASKED ABOUT UNLESS OTHERWISE NOTED  QOL impact -   Location -                    Aggravating factors -  Quality -  Radiation -  Timing-  Severity (0-10 scale):  Minimal acceptable level (0-10 scale):     CPOT:  0  https://[email or web address]/getattachment/[email or web address]/Critical-Care-Pain-Observation-Tool-(CPOT)    PAIN AD Score:   [email or web address]/painad.pdf (press ctrl +  left click to view)    Dyspnea:                           [ ]None[ ]Mild [ ]Moderate [ ]Severe     Respiratory Distress Observation Scale (RDOS): 0  A score of 0 to 2 signifies little or no respiratory distress, 3 signifies mild distress, scores 4 to 6 indicate moderate distress, and scores greater than 7 signify severe distress  [email or web address]/sites/[email or web address]-odcwssxeaxw-ojpchvzq-krphyyezlwp-wqvab.pdf    Anxiety:                             [ ]None[ ]Mild [ ]Moderate [ ]Severe   Fatigue:                             [ ]None[ ]Mild [ ]Moderate [ ]Severe   Nausea:                             [ ]None[ ]Mild [ ]Moderate [ ]Severe   Loss of appetite:              [ ]None[ ]Mild [ ]Moderate [ ]Severe   Constipation:                    [ ]None[ ]Mild [ ]Moderate [ ]Severe    Other Symptoms:  [ ]All other review of systems negative     Palliative Performance Status Version 2:        10%    [email or web address]    PHYSICAL EXAM:  Vital Signs Last 24 Hrs  T(C): 36.5 (06 Jun 2025 08:00), Max: 36.5 (06 Jun 2025 08:00)  T(F): 97.7 (06 Jun 2025 08:00), Max: 97.7 (06 Jun 2025 08:00)  HR: 46 (06 Jun 2025 15:19) (44 - 66)  BP: 119/56 (06 Jun 2025 09:00) (117/57 - 182/79)  BP(mean): 81 (06 Jun 2025 09:00) (81 - 114)  RR: 50 (06 Jun 2025 09:00) (14 - 50)  SpO2: 100% (06 Jun 2025 15:19) (96% - 100%)    Parameters below as of 06 Jun 2025 07:00  Patient On (Oxygen Delivery Method): ventilator    O2 Concentration (%): 60 I&O's Summary    05 Jun 2025 07:01  -  06 Jun 2025 07:00  --------------------------------------------------------  IN: 750.4 mL / OUT: 575 mL / NET: 175.4 mL        GENERAL:  [ ] No acute distress [ ]Lethargic  [ x]Unarousable  [ ]Verbal  [ ]Non-Verbal [ ]Cachexia    BEHAVIORAL/PSYCH:  [ ]Alert and Oriented x  [ ] Anxiety [ ] Delirium [ ] Agitation [x ] Calm   EYES: [x ] No scleral icterus [ ] Scleral icterus [ ] Closed  ENMT:  [ ]Dry mouth  [x ]No external oral lesions [ ] No external ear or nose lesions  CARDIOVASCULAR:  [ ]Regular [ ]Irregular [ ]Tachy [ ]Not Tachy  [ ]Dre [ ] Edema [ ] No edema  PULMONARY:  [ ]Tachypnea  [ ]Audible excessive secretions [x ] No labored breathing [ ] labored breathing  GASTROINTESTINAL: [ ]Soft  [ ]Distended  [ ]Not distended [ ]Non tender [ ]Tender  MUSCULOSKELETAL: [ ]No clubbing [ ] clubbing  [ ] No cyanosis [ ] cyanosis  NEUROLOGIC: [ ]No focal deficits  [ ]Follows commands  [ x]Does not follow commands  [ ]Cognitive impairment  [ ]Dysphagia  [ ]Dysarthria  [ ]Paresis   SKIN: [ ] Jaundiced [ ] Non-jaundiced [ ]Rash [ ]No Rash [ ] Warm [ ] Dry  MISC/LINES: [x ] ET tube [ ] Trach [ ]NGT/OGT [ ]PEG [ ]Cueto    LABS: Interpreted by me - BMP shows elevated creatinine, CBC shows anemia, LFTs show low total protein                        9.2    9.11  )-----------( 166      ( 06 Jun 2025 14:20 )             29.4   06-06    133[L]  |  100  |  37[H]  ----------------------------<  437[H]  5.3[H]   |  20  |  1.7[H]    Ca    7.9[L]      06 Jun 2025 08:27  Phos  6.3     06-06  Mg     2.4     06-06    TPro  5.5[L]  /  Alb  3.4[L]  /  TBili  0.3  /  DBili  x   /  AST  45[H]  /  ALT  41  /  AlkPhos  89  06-06  PT/INR - ( 06 Jun 2025 01:42 )   PT: 16.50 sec;   INR: 1.39 ratio         PTT - ( 06 Jun 2025 01:42 )  PTT:28.2 sec    Urinalysis Basic - ( 06 Jun 2025 08:27 )    Color: x / Appearance: x / SG: x / pH: x  Gluc: 437 mg/dL / Ketone: x  / Bili: x / Urobili: x   Blood: x / Protein: x / Nitrite: x   Leuk Esterase: x / RBC: x / WBC x   Sq Epi: x / Non Sq Epi: x / Bacteria: x      RADIOLOGY & ADDITIONAL STUDIES: Interpreted by me    < from: CT Abdomen and Pelvis w/ IV Cont (06.06.25 @ 01:53) >    IMPRESSION:  1.  No evidence for central or segmental pulmonary emboli.    2.  Left lower lobe consolidation, which may represent pneumonia in   appropriate clinical setting.    3.  Mucosal hyperemia and mild mural thickening of the ascending,   transverse, descending colon, possibly reflecting sequelae of   hypoperfusion.    4.  Moderate right and small left pleural effusions.    5.  Mildly displaced left L3 transverse process fracture.    6.  Nondisplaced fractures of right ribs 3 through 6. Nondisplaced   fractures of left ribs 4 through 6.    7.Focus of air within the bladder. Correlate for history of   instrumentation.    < end of copied text >      EKG: Interpreted by me    < from: 12 Lead ECG (06.03.25 @ 05:18) >  Ventricular Rate 103 BPM    QRS Duration 98 ms    Q-T Interval 348 ms    QTC Calculation(Bazett) 455 ms    R Axis 77 degrees    T Axis 157 degrees    Diagnosis Line Atrial fibrillation with rapid ventricular response  Statement not found (#710) Statement not found (#713)  Abnormal ECG    < end of copied text >      PROTEIN CALORIE MALNUTRITION PRESENT: [ ]mild [ ]moderate [ ]severe [ ]underweight [ ]morbid obesity  https://www.andeal.org/vault/2440/web/files/ONC/Table_Clinical%20Characteristics%20to%20Document%20Malnutrition-White%20JV%20et%20al%202012.pdf    Height (cm): 177.8 (06-06-25 @ 04:00), 172.7 (06-03-25 @ 12:51)  Weight (kg): 87.6 (06-06-25 @ 04:00), 97 (06-01-25 @ 01:59)  BMI (kg/m2): 27.7 (06-06-25 @ 04:00), 32.5 (06-03-25 @ 12:51)  [ ]PPSV2 < or = to 30% [ ]significant weight loss  [ ]poor nutritional intake  [ ]anasarca      [ ]Artificial Nutrition      Palliative Care Spiritual/Emotional Screening Tool Question  Severity (0-4):                    OR                    [ x] Unable to determine. Will assess at later time if appropriate.  Score of 2 or greater indicates recommendation of Chaplaincy and/or SW referral  Chaplaincy Referral: [ ] Yes [ ] Refused [ ] Following     Caregiver [place]:  [ ] Yes [ ] No    OR    [x ] Unable to determine. Will assess at later time if appropriate.  Social Work Referral [ ]  Patient and Family Centered Care Referral [ ]    Anticipatory Grief Present: [ ] Yes [ ] No    OR     [ x] Unable to determine. Will assess at later time if appropriate.  Social Work Referral [ ]  Patient and Family Centered Care Referral [ ]    Patient discussed with primary medical team MD  Palliative care education provided to patient and/or family

## 2025-06-06 NOTE — CONSULT NOTE ADULT - ASSESSMENT
Patient is a 69 year old male with PMH of DM, HTN, HLD, and CAD (CABG 2024) presenting post-cardiac arrest. Per EMS, 911 was called by family after patient had a witnessed cardiac arrest at home; EMS states they arrived to the home shortly thereafter (approximately 5 minutes); Neurology consulted for myoclonic jerks. EEG showing generalized spikes that reportedly is associated with myoclonus.     Plan:  - Seizure/Fall/Aspiration precautions  - Goal Pox >92%  - maintain K > 4.0- 4.9 and Mg > 2.0-2.5  - avoid carbapenems, 4th gen cephalosporins, fluoroquinolones  - F/u vEEG continuous monitoring  - Start keppra 750mg BID  - C/w Ativan 2mg PRN for seizure activity >2min       Patient is a 69 year old male with PMH of DM, HTN, HLD, and CAD (CABG 2024) presenting post-cardiac arrest. Per EMS, 911 was called by family after patient had a witnessed cardiac arrest at home; EMS states they arrived to the home shortly thereafter (approximately 5 minutes); Neurology consulted for myoclonic jerks. EEG showing generalized spikes that reportedly is associated with myoclonus.   Suspect underlying anoxia injury.    Plan:  - Seizure/Fall/Aspiration precautions  - Goal Pox >92%  - maintain K > 4.0- 4.9 and Mg > 2.0-2.5  - avoid carbapenems, 4th gen cephalosporins, fluoroquinolones  - F/u vEEG continuous monitoring  - Start keppra 750mg BID  - maximize propofol for now  - consider switch fentanyl to precedex  - C/w Ativan 2mg PRN for seizure activity >2min

## 2025-06-06 NOTE — ED PROVIDER NOTE - PHYSICAL EXAMINATION
VITAL SIGNS: I have reviewed nursing notes and confirm.  CONSTITUTIONAL: post-cardiac arrest ROSC  SKIN: Warm dry, normal skin turgor  EYES: No conjunctival injection, scleral anicteric  CARD: RRR, no murmurs, rubs or gallops  RESP: Intubated with diffuse crackles  ABD: soft, distended

## 2025-06-06 NOTE — ED PROVIDER NOTE - ATTENDING CONTRIBUTION TO CARE
69-year-old male with PMH DM, HTN, HLD, CAD status post CABG brought in by EMS status post cardiac arrest.  Patient found in arrest by family at home.  Per EMS [facility] was called by family after patient had witnessed cardiac arrest.  Unknown if bystander CPR was initiated.  EMS arrived quickly to scene and initiated CPR with PEA initially present.  Patient intubated in the field and ROSC obtained after 2 rounds CPR with 2 doses of epinephrine given.  Patient with recent hospitalization for flash pulmonary edema and hypertensive emergency 2 days earlier.    VITAL SIGNS: noted  CONSTITUTIONAL: Unresponsive, intubated  HEAD: Normocephalic; atraumatic  EYES: PERRL, EOM intact; conjunctiva and sclera clear  ENT: No nasal discharge; airway clear. MMM  NECK: Supple; non tender.    CARD: S1, S2 normal; no murmurs, gallops, or rubs. Regular rate and rhythm  RESP: diffuse crackles  ABD: Normal bowel sounds; soft; mildly distended tenderness  EXT: Normal ROM. No calf tenderness or edema. Distal pulses intact  NEURO: Unresponsive, GCS 3T  SKIN: Skin exam is warm and dry

## 2025-06-06 NOTE — ED ADULT NURSE NOTE - NS PRO PASSIVE SMOKE EXP
Dr. Kyle Hernandez updated that patient is having complaints of burning with urination. Orders given. Unknown

## 2025-06-06 NOTE — ED PROVIDER NOTE - CLINICAL SUMMARY MEDICAL DECISION MAKING FREE TEXT BOX
alert x3
Patient's evaluated status post cardiac arrest with ROSC obtained in field, airway confirmed and patient treated for possible pneumonia.  Pan scan initiated to rule out traumatic injuries, possible PE.  Patient admitted to ICU for continued monitoring and further care

## 2025-06-06 NOTE — H&P ADULT - NSHPPHYSICALEXAM_GEN_ALL_CORE
Patient is intubated and sedated  Regular S1S2  Clear lungs on anterior auscultation  Soft abdomen, non tender, +ve BS  Lower limb edema Patient is intubated and sedated, bilateral constrictive pupils  Regular S1S2  Clear lungs on anterior auscultation  Soft abdomen, non tender, +ve BS  No lower limb edema Patient is intubated and sedated, bilateral constrictive pupils  Rhythmic jerky movements on exam  Regular S1S2  Clear lungs on anterior auscultation  Soft abdomen, non tender, +ve BS  No lower limb edema

## 2025-06-06 NOTE — H&P ADULT - ASSESSMENT
IMPRESSION:    INCOMPLETE NOTE    Cardiopulmonary arrest   Possible aspiration PNA  Afib  DM  HTN       PLAN:    CNS: Sedated on Fentanyl,  SAT, EEG. Neuro for prognostication,     HEENT:  Oral care.  ET care     PULMONARY:  HOB @ 45 degrees, aspiration precaution.  Vent settings: .  Wean O2.  Keep sat 92 TO 96%.  Monitor airway pressures.     CARDIOVASCULAR: Trend Troponin. ECG sinus latoya with nonspecific ST changes. Recent TTE   Bedside pocus:   Monitor BP, resume home meds if needed (on Coreg 6.25 mg BID and Nifedipine 90 mg daily)    GI: GI prophylaxis. OG feeding. Bowel regimen. Trend LFT    RENAL: Crea 1.8 (around baseline). F/u  lytes. Correct as needed.  Accurate I/O.      INFECTIOUS DISEASE: Continue broad spectrum ABX. F/U blood cultures. Nasal MRSA.      HEMATOLOGICAL: DVT prophylaxis (On Eliquis at home). LE doppler.    ENDOCRINE:  Follow up FS.  Insulin protocol if needed.    MICU    IMPRESSION:    INCOMPLETE NOTE    Cardiopulmonary arrest   PNA  Afib, DM, HTN       PLAN:    CNS: Sedated on Fentanyl and Propofol,  SAT in am, VEEG. Neuro for prognostication,     HEENT:  Oral care.  ET care     PULMONARY:  HOB @ 45 degrees, aspiration precaution.  Vent settings: .  Wean O2.  Keep sat 92 TO 96%.  Monitor airway pressures.     CARDIOVASCULAR: Trend Troponin. ECG sinus latoya with nonspecific ST changes.   Recent TTE (6/2025) EF 60-65%  Monitor BP, resume home meds if needed (on Coreg 6.25 mg BID and Nifedipine 90 mg daily)    GI: GI prophylaxis. OG feeding. Bowel regimen. Trend LFT    RENAL: Crea 1.8 (around baseline). F/u  lytes. Correct as needed.  Accurate I/O.      INFECTIOUS DISEASE: Continue broad spectrum ABX. F/U blood cultures. Nasal MRSA.      HEMATOLOGICAL: DVT prophylaxis: Heparin drip, (on Eliquis at home). LE doppler.    ENDOCRINE:  Follow up FS.  Insulin protocol if needed.    MICU    IMPRESSION:      Cardiac arrest - downtime around 9 minutes   PNA  HO Afib, DM, HTN         PLAN:    CNS: Sedated on Fentanyl and Propofol,  SAT in am, assess neurological status, VEEG. Neuro for prognostication, avoid hyperthermia    HEENT:  Oral care.  ET care     PULMONARY:  HOB @ 45 degrees, aspiration precaution. Intubated, AC/VC mode, repeat CXR in am, ABGs in am. Wean O2.  Keep sat 92 TO 96%. Monitor airway pressures.     CARDIOVASCULAR: Off pressors. Target MAP > 65. Trend Troponin. ECG sinus latoya with nonspecific ST changes.   Recent TTE (6/2025) EF 60-65%  Monitor BP, resume home meds if needed (on Coreg 6.25 mg BID and Nifedipine 90 mg daily)    GI: GI prophylaxis. OG feeding. Bowel regimen. Trend LFT    RENAL: Crea 1.8 (around baseline). F/u  lytes. Correct as needed.  Accurate I/O.      INFECTIOUS DISEASE: Continue broad spectrum ABX. F/U blood cultures. Nasal MRSA.      HEMATOLOGICAL: DVT prophylaxis: Heparin drip, (on Eliquis at home). LE doppler.    ENDOCRINE:  Follow up FS.  Insulin protocol if needed.    MICU                IMPRESSION:      Cardiac arrest - downtime around 9 minutes   PNA  HO Afib, DM, HTN         PLAN:    CNS: Sedated on Fentanyl and Propofol,  SAT in am, assess neurological status, VEEG. Neuro for prognostication, avoid hyperthermia    HEENT:  Oral care.  ET care     PULMONARY:  HOB @ 45 degrees, aspiration precaution. Intubated, AC/VC mode, repeat CXR in am, ABGs in am. Wean O2.  Keep sat 92 TO 96%. Monitor airway pressures.     CARDIOVASCULAR: Off pressors. Target MAP > 65. Trend Troponin. ECG sinus latoya with nonspecific ST changes.   Recent TTE (6/2025) EF 60-65%   Bedside POCUS: Good EF, no D sign. Diffuse B lines.   Monitor BP, resume home meds if needed (on Coreg 6.25 mg BID and Nifedipine 90 mg daily)    GI: GI prophylaxis. OG feeding. Bowel regimen. Trend LFT    RENAL: Crea 1.8 (around baseline). F/u  lytes. Correct as needed.  Accurate I/O.      INFECTIOUS DISEASE: Continue broad spectrum ABX. F/U blood cultures. Nasal MRSA.      HEMATOLOGICAL: DVT prophylaxis: Heparin drip, (on Eliquis at home). LE doppler.    ENDOCRINE:  Follow up FS.  Insulin protocol if needed.    MICU

## 2025-06-06 NOTE — CONSULT NOTE ADULT - ASSESSMENT
69 year old male with PMH of DM, HTN, HLD, and CAD (CABG 2024) presenting post-cardiac arrest  Patient with ROSC after 9min and is now intubated.  Palliative care consulted for GOC.    Spoke with patient's partner, Lori, outside patient's room in ICU.  Palliative care introduced. She was able to provide a medical history and hospital course. She noted the patient went into cardiac arrest while laying down. We discussed code status and GOC.  Discussed DNR.  She noted that if the patient went into cardiac arrest, he would not want CPR again, as it would not provide a quality of life that would be acceptable to him. Patient is DNR.  We discussed goals should the patient not medically improve or be able to be weaned from the ventilator. She noted the patient would not likely wish for trach/PEG unless there was possibility of functional recovery, and she would likely wish to pursue comfort measures only and palliative extubation in that situation.  All questions answered.    Of note, patient and Lori Mayfield are not . However, per Lori, they have lived together for 10 years, have children together and share finances.  She would qualify as a domestic partner and would be surrogate for the patient in the absence of a HCP.    Plan  -DNR/intubate  -new MOLST filled out and placed in chart.  -ongoing medical management  -vent management per primary team  -patient's family believes family would likely not want trach/PEG and longterm ventillation, dependent on clinical course  -will follow for C    Education about palliative care provided to patient/family.  Discussed with Dr. Merino    Please call [phone] with questions or concerns 24/7.

## 2025-06-06 NOTE — ED PROVIDER NOTE - OBJECTIVE STATEMENT
Patient is a 69 year old male with PMH of DM, HTN, HLD, and CAD (CABG 2024) presenting post-cardiac arrest. Per EMS, 911 was called by family after patient had a witnessed cardiac arrest at home; unclear if bystander CPR was initiated. EMS states they arrived to the home shortly thereafter (approximately 5 minutes); initial rhythm PEA, patient intubated in the field, and ROSC after two rounds of CPR with Epi x2 given. No further history obtained by family.     Of note, patient was discharged from the hospital two days ago after experiencing an acute flash pulmonary event and hypertensive emergency.

## 2025-06-06 NOTE — CONSULT NOTE ADULT - CONVERSATION DETAILS
Spoke with patient's partner, Lori, outside patient's room in ICU.  Palliative care introduced. She was able to provide a medical history and hospital course. She noted the patient went into cardiac arrest while laying down. We discussed code status and GOC.  Discussed DNR.  She noted that if the patient went into cardiac arrest, he would not want CPR again, as it would not provide a quality of life that would be acceptable to him. Patient is DNR.  We discussed goals should the patient not medically improve or be able to be weaned from the ventilator. She noted the patient would not likely wish for trach/PEG unless there was possibility of functional recovery, and she would likely wish to pursue comfort measures only and palliative extubation in that situation.  All questions answered.

## 2025-06-07 NOTE — PROGRESS NOTE ADULT - ASSESSMENT
#Acute Hypoxemic respiratory failure   #SP CPA.  OOH witnessed  Down time around 9 min  #Possible aspiration   #Bilateral effusions   #HO HFPEF with recent admission for HFPEF exacerbation adn HTN emergency  #CKD   #DM, HTN, HLD, and CAD SP CABG 2024  #HO Afib     PLAN:    CNS: EEG noted.  VEEG.  SAT.  Repeat CTH in am.  NSE.  Avoid hyperthermia     HEENT: Oral care.  ET care     PULMONARY:  HOB @ 45 degrees.  Aspiration precautions;  Wean O2.  .  A line.  Monitor airway pressures      CARDIOVASCULAR:  CE.  ECHO.  Maximize cardiac therapy and volume status     GI: GI prophylaxis.  Feeding.  Bowel regimen     RENAL:  Follow up lytes.  Correct as needed.  Monitor UO     INFECTIOUS DISEASE: Follow up cultures.  ZOsyn.  Nasal MRSA negative.      HEMATOLOGICAL:  DVT prophylaxis. Monitor CBC and coags.  Continue IV heparin.        ENDOCRINE:  Follow up FS.  Insulin protocol if needed.      MUSCULOSKELETAL:  Bed rest.  Off loading     MICU     DNR/Intubate

## 2025-06-07 NOTE — PROGRESS NOTE ADULT - ASSESSMENT
IMPRESSION:    Acute Hypoxemic respiratory failure   SP CPA.  OOH witnessed  Down time around 9 min  Possible aspiration   Bilateral effusions   HO HFPEF with recent admission for HFPEF exacerbation adn HTN emergency  CKD   DM, HTN, HLD, and CAD SP CABG 2024  HO Afib     PLAN:    CNS: EEG noted.  VEEG.  SAT.  Repeat CTH in am.  NSE.  Avoid hyperthermia     HEENT: Oral care.  ET care     PULMONARY:  HOB @ 45 degrees.  Aspiration precautions;  Wean O2.  .  A line.  Monitor airway pressures      CARDIOVASCULAR:  CE.  ECHO.  Maximize cardiac therapy and volume status     GI: GI prophylaxis.  Feeding.  Bowel regimen     RENAL:  Follow up lytes.  Correct as needed.  Monitor UO     INFECTIOUS DISEASE: Follow up cultures.  ZOsyn.  Nasal MRSA negative.      HEMATOLOGICAL:  DVT prophylaxis. Monitor CBC and coags.  Continue IV heparin.        ENDOCRINE:  Follow up FS.  Insulin protocol if needed.      MUSCULOSKELETAL:  Bed rest.  Off loading     MICU                IMPRESSION:    Acute Hypoxemic respiratory failure   SP CPA.  OOH witnessed  Down time around 9 min  Possible aspiration   Bilateral effusions   HO HFPEF with recent admission for HFPEF exacerbation adn HTN emergency  CKD   DM, HTN, HLD, and CAD SP CABG 2024  HO Afib     PLAN:    CNS: EEG noted.  VEEG.   Repeat CTH in am. SAT  after CTH.  NSE.  Avoid hyperthermia. Neuro follow up. c/w keppra    HEENT: Oral care.  ET care     PULMONARY:  HOB @ 45 degrees.  Aspiration precautions;  Wean O2.  .  A line.  Monitor airway pressures.  sbt when awake     CARDIOVASCULAR:  CE.  ECHO.  Maximize cardiac therapy and volume status     GI: GI prophylaxis.  Feeding.  Bowel regimen     RENAL:  Follow up lytes.  Correct as needed.  Monitor UO.     INFECTIOUS DISEASE: Follow up cultures.  ZOsyn.  Nasal MRSA negative.  send DTA, procal, rvp.    HEMATOLOGICAL:  DVT prophylaxis. Monitor CBC and coags.  Continue IV heparin.        ENDOCRINE:  Follow up FS.  Insulin protocol if needed.      MUSCULOSKELETAL:  Bed rest.  Off loading     MICU

## 2025-06-07 NOTE — PROGRESS NOTE ADULT - ATTENDING COMMENTS
Attending Statement: I have personally performed a face to face diagnostic evaluation on this patient. The patient is suffering from:  Acute Hypoxemic respiratory failure   SP CPA.  OOH witnessed  Down time around 9 min  Possible aspiration   Bilateral effusions   HO HFPEF with recent admission for HFPEF exacerbation adn HTN emergency  CKD   DM, HTN, HLD, and CAD SP CABG 2024  I have made amendments to the documentation where necessary. I have personally seen and examined this patient.  I have fully participated in the care of this patient.  I have reviewed all pertinent clinical information, including history, physical exam, plan and note.

## 2025-06-07 NOTE — PROGRESS NOTE ADULT - SUBJECTIVE AND OBJECTIVE BOX
Neurology Progress Note    Interval History:    HPI:     Patient is a 69 year old male with PMH of DM, HTN, HLD, and CAD (CABG 2024) presenting post-cardiac arrest. Per EMS, 911 was called by family after patient had a witnessed cardiac arrest at home; EMS states they arrived to the home shortly thereafter (approximately 5 minutes); initial rhythm PEA, patient intubated in the field, and ROSC after two rounds of CPR with Epi x2 given.   Spoke with wife, patient was fine all day, before he had the arrest he was lying down, complained of SOB them passed out.     Of note, patient was discharged from the hospital two days ago after experiencing an acute flash pulmonary event and hypertensive emergency.    In ED:  /73, HR 66, Temp 95.5, Intubated  wbc 13.4  hb 11.6  plt 232  Na 139, K 5.4  Crea 1.8 (at baseline)   AG 19  Troponin 100   AST 98, ALT 66  Lactate 5.5     ECG: sinus latoya with nonspecific ST changes     CT HEAD:  No evidence of acute intracranial pathology.    CT CERVICAL SPINE:  No evidence of acute cervical spine traumatic injury.    CT chest abdomen pelvis:    1.  No evidence for central or segmental pulmonary emboli.  2.  Left lower lobe consolidation, which may represent pneumonia in appropriate clinical setting.  3.  Mucosal hyperemia and mild mural thickening of the ascending, transverse, descending colon, possibly reflecting sequelae of hypoperfusion.  4.  Moderate right and small left pleural effusions.  5.  Mildly displaced left L3 transverse process fracture.  6.  Nondisplaced fractures of right ribs 3 through 6. Nondisplaced fractures of left ribs 4 through 6.  7.  Focus of air within the bladder. Correlate for history of instrumentation.   (06 Jun 2025 02:51)        Vital Signs Last 24 Hrs  T(C): 37.9 (07 Jun 2025 04:00), Max: 38.3 (06 Jun 2025 23:32)  T(F): 100.3 (07 Jun 2025 04:00), Max: 100.9 (06 Jun 2025 23:32)  HR: 54 (07 Jun 2025 07:10) (45 - 72)  BP: 115/58 (07 Jun 2025 07:00) (84/48 - 148/61)  BP(mean): 84 (07 Jun 2025 07:00) (62 - 91)  RR: 20 (07 Jun 2025 07:00) (20 - 50)  SpO2: 100% (07 Jun 2025 07:10) (89% - 100%)    Parameters below as of 07 Jun 2025 07:00  Patient On (Oxygen Delivery Method): ventilator    O2 Concentration (%): 40    Neurological Exam:   Patient on propofol and fentanyl  No gaze, pupils 1mm and minimal reactivity  Not following commands  No facial asymmetry  Tremulous movements at times  Spontaneous blinking limiting exam.   Gag reflex non discernable  Minimal movement to bilateral UE LE to peripheral noxious stimuli    Medications:  MEDICATIONS  (STANDING):  aspirin  chewable 81 milliGRAM(s) Oral daily  atorvastatin 80 milliGRAM(s) Oral at bedtime  chlorhexidine 0.12% Liquid 15 milliLiter(s) Oral Mucosa every 12 hours  chlorhexidine 2% Cloths 1 Application(s) Topical <User Schedule>  dextrose 5%. 1000 milliLiter(s) (50 mL/Hr) IV Continuous <Continuous>  dextrose 50% Injectable 25 Gram(s) IV Push once  fentaNYL   Infusion. 0.5 MICROgram(s)/kG/Hr (4.38 mL/Hr) IV Continuous <Continuous>  glucagon  Injectable 1 milliGRAM(s) IntraMuscular once  heparin  Infusion.  Unit(s)/Hr (16 mL/Hr) IV Continuous <Continuous>  insulin lispro (ADMELOG) corrective regimen sliding scale   SubCutaneous three times a day before meals  levETIRAcetam   Injectable 750 milliGRAM(s) IV Push every 12 hours  piperacillin/tazobactam IVPB.. 3.375 Gram(s) IV Intermittent every 8 hours  propofol Infusion 0.495 MICROgram(s)/kG/Min (0.26 mL/Hr) IV Continuous <Continuous>  senna 2 Tablet(s) Oral at bedtime  sodium zirconium cyclosilicate 10 Gram(s) Oral every 8 hours      Labs:  CBC Full  -  ( 07 Jun 2025 04:31 )  WBC Count : 11.73 K/uL  RBC Count : 2.84 M/uL  Hemoglobin : 8.0 g/dL  Hematocrit : 25.4 %  Platelet Count - Automated : 149 K/uL  Mean Cell Volume : 89.4 fL  Mean Cell Hemoglobin : 28.2 pg  Mean Cell Hemoglobin Concentration : 31.5 g/dL  Auto Neutrophil # : x  Auto Lymphocyte # : x  Auto Monocyte # : x  Auto Eosinophil # : x  Auto Basophil # : x  Auto Neutrophil % : x  Auto Lymphocyte % : x  Auto Monocyte % : x  Auto Eosinophil % : x  Auto Basophil % : x    06-06    142  |  107  |  39[H]  ----------------------------<  61[L]  4.8   |  24  |  2.2[H]    Ca    8.1[L]      06 Jun 2025 21:04  Phos  6.3     06-06  Mg     2.2     06-06    TPro  5.1[L]  /  Alb  3.1[L]  /  TBili  0.4  /  DBili  x   /  AST  36  /  ALT  40  /  AlkPhos  89  06-06    LIVER FUNCTIONS - ( 06 Jun 2025 16:49 )  Alb: 3.1 g/dL / Pro: 5.1 g/dL / ALK PHOS: 89 U/L / ALT: 40 U/L / AST: 36 U/L / GGT: x           PT/INR - ( 06 Jun 2025 16:49 )   PT: 14.50 sec;   INR: 1.22 ratio         PTT - ( 07 Jun 2025 04:22 )  PTT:>200.0 sec  Urinalysis Basic - ( 06 Jun 2025 21:04 )    Color: x / Appearance: x / SG: x / pH: x  Gluc: 61 mg/dL / Ketone: x  / Bili: x / Urobili: x   Blood: x / Protein: x / Nitrite: x   Leuk Esterase: x / RBC: x / WBC x   Sq Epi: x / Non Sq Epi: x / Bacteria: x       Neurology Progress Note    Interval History:    HPI:     Patient is a 69 year old male with PMH of DM, HTN, HLD, and CAD (CABG 2024) presenting post-cardiac arrest. Per EMS, 911 was called by family after patient had a witnessed cardiac arrest at home; EMS states they arrived to the home shortly thereafter (approximately 5 minutes); initial rhythm PEA, patient intubated in the field, and ROSC after two rounds of CPR with Epi x2 given.   Spoke with wife, patient was fine all day, before he had the arrest he was lying down, complained of SOB them passed out.     Of note, patient was discharged from the hospital two days ago after experiencing an acute flash pulmonary event and hypertensive emergency.    In ED:  /73, HR 66, Temp 95.5, Intubated  wbc 13.4  hb 11.6  plt 232  Na 139, K 5.4  Crea 1.8 (at baseline)   AG 19  Troponin 100   AST 98, ALT 66  Lactate 5.5     ECG: sinus latoya with nonspecific ST changes     CT HEAD:  No evidence of acute intracranial pathology.    CT CERVICAL SPINE:  No evidence of acute cervical spine traumatic injury.    CT chest abdomen pelvis:    1.  No evidence for central or segmental pulmonary emboli.  2.  Left lower lobe consolidation, which may represent pneumonia in appropriate clinical setting.  3.  Mucosal hyperemia and mild mural thickening of the ascending, transverse, descending colon, possibly reflecting sequelae of hypoperfusion.  4.  Moderate right and small left pleural effusions.  5.  Mildly displaced left L3 transverse process fracture.  6.  Nondisplaced fractures of right ribs 3 through 6. Nondisplaced fractures of left ribs 4 through 6.  7.  Focus of air within the bladder. Correlate for history of instrumentation.   (06 Jun 2025 02:51)        Vital Signs Last 24 Hrs  T(C): 37.9 (07 Jun 2025 04:00), Max: 38.3 (06 Jun 2025 23:32)  T(F): 100.3 (07 Jun 2025 04:00), Max: 100.9 (06 Jun 2025 23:32)  HR: 54 (07 Jun 2025 07:10) (45 - 72)  BP: 115/58 (07 Jun 2025 07:00) (84/48 - 148/61)  BP(mean): 84 (07 Jun 2025 07:00) (62 - 91)  RR: 20 (07 Jun 2025 07:00) (20 - 50)  SpO2: 100% (07 Jun 2025 07:10) (89% - 100%)    Parameters below as of 07 Jun 2025 07:00  Patient On (Oxygen Delivery Method): ventilator    O2 Concentration (%): 40    Neurological Exam:   Patient on propofol and fentanyl  No gaze, pupils 1mm and minimal reactivity  Not following commands  No facial asymmetry  Tremulous movements at times  Spontaneous blinking limiting exam.   Gag reflex non discernable  Minimal movement to bilateral UE LE to peripheral noxious stimuli    Medications:  MEDICATIONS  (STANDING):  aspirin  chewable 81 milliGRAM(s) Oral daily  atorvastatin 80 milliGRAM(s) Oral at bedtime  chlorhexidine 0.12% Liquid 15 milliLiter(s) Oral Mucosa every 12 hours  chlorhexidine 2% Cloths 1 Application(s) Topical <User Schedule>  dextrose 5%. 1000 milliLiter(s) (50 mL/Hr) IV Continuous <Continuous>  dextrose 50% Injectable 25 Gram(s) IV Push once  fentaNYL   Infusion. 0.5 MICROgram(s)/kG/Hr (4.38 mL/Hr) IV Continuous <Continuous>  glucagon  Injectable 1 milliGRAM(s) IntraMuscular once  heparin  Infusion.  Unit(s)/Hr (16 mL/Hr) IV Continuous <Continuous>  insulin lispro (ADMELOG) corrective regimen sliding scale   SubCutaneous three times a day before meals  levETIRAcetam   Injectable 750 milliGRAM(s) IV Push every 12 hours  piperacillin/tazobactam IVPB.. 3.375 Gram(s) IV Intermittent every 8 hours  propofol Infusion 0.495 MICROgram(s)/kG/Min (0.26 mL/Hr) IV Continuous <Continuous>  senna 2 Tablet(s) Oral at bedtime  sodium zirconium cyclosilicate 10 Gram(s) Oral every 8 hours      Labs:  CBC Full  -  ( 07 Jun 2025 04:31 )  WBC Count : 11.73 K/uL  RBC Count : 2.84 M/uL  Hemoglobin : 8.0 g/dL  Hematocrit : 25.4 %  Platelet Count - Automated : 149 K/uL  Mean Cell Volume : 89.4 fL  Mean Cell Hemoglobin : 28.2 pg  Mean Cell Hemoglobin Concentration : 31.5 g/dL  Auto Neutrophil # : x  Auto Lymphocyte # : x  Auto Monocyte # : x  Auto Eosinophil # : x  Auto Basophil # : x  Auto Neutrophil % : x  Auto Lymphocyte % : x  Auto Monocyte % : x  Auto Eosinophil % : x  Auto Basophil % : x    06-06    142  |  107  |  39[H]  ----------------------------<  61[L]  4.8   |  24  |  2.2[H]    Ca    8.1[L]      06 Jun 2025 21:04  Phos  6.3     06-06  Mg     2.2     06-06    TPro  5.1[L]  /  Alb  3.1[L]  /  TBili  0.4  /  DBili  x   /  AST  36  /  ALT  40  /  AlkPhos  89  06-06    LIVER FUNCTIONS - ( 06 Jun 2025 16:49 )  Alb: 3.1 g/dL / Pro: 5.1 g/dL / ALK PHOS: 89 U/L / ALT: 40 U/L / AST: 36 U/L / GGT: x           PT/INR - ( 06 Jun 2025 16:49 )   PT: 14.50 sec;   INR: 1.22 ratio         PTT - ( 07 Jun 2025 04:22 )  PTT:>200.0 sec  Urinalysis Basic - ( 06 Jun 2025 21:04 )    Color: x / Appearance: x / SG: x / pH: x  Gluc: 61 mg/dL / Ketone: x  / Bili: x / Urobili: x   Blood: x / Protein: x / Nitrite: x   Leuk Esterase: x / RBC: x / WBC x   Sq Epi: x / Non Sq Epi: x / Bacteria: x      EEG CLASSIFICATION:   Findings: Abnormal.     Special findings: Burst-suppression pattern.     Seizure Classification: Tonic seizure generalized.    INTERPRETATION:   This is an abnormal EEG consistent with severe diffuse cerebral dysfunction.     Multiple brief tonic seizures of generalized onset.     Neurology Progress Note    Interval History:    HPI:     Patient is a 69 year old male with PMH of DM, HTN, HLD, and CAD (CABG 2024) presenting post-cardiac arrest. Per EMS, 911 was called by family after patient had a witnessed cardiac arrest at home; EMS states they arrived to the home shortly thereafter (approximately 5 minutes); initial rhythm PEA, patient intubated in the field, and ROSC after two rounds of CPR with Epi x2 given.   Spoke with wife, patient was fine all day, before he had the arrest he was lying down, complained of SOB them passed out.     Of note, patient was discharged from the hospital two days ago after experiencing an acute flash pulmonary event and hypertensive emergency.    In ED:  /73, HR 66, Temp 95.5, Intubated  wbc 13.4  hb 11.6  plt 232  Na 139, K 5.4  Crea 1.8 (at baseline)   AG 19  Troponin 100   AST 98, ALT 66  Lactate 5.5     ECG: sinus latoya with nonspecific ST changes     CT HEAD:  No evidence of acute intracranial pathology.    CT CERVICAL SPINE:  No evidence of acute cervical spine traumatic injury.    CT chest abdomen pelvis:    1.  No evidence for central or segmental pulmonary emboli.  2.  Left lower lobe consolidation, which may represent pneumonia in appropriate clinical setting.  3.  Mucosal hyperemia and mild mural thickening of the ascending, transverse, descending colon, possibly reflecting sequelae of hypoperfusion.  4.  Moderate right and small left pleural effusions.  5.  Mildly displaced left L3 transverse process fracture.  6.  Nondisplaced fractures of right ribs 3 through 6. Nondisplaced fractures of left ribs 4 through 6.  7.  Focus of air within the bladder. Correlate for history of instrumentation.   (06 Jun 2025 02:51)        Vital Signs Last 24 Hrs  T(C): 37.9 (07 Jun 2025 04:00), Max: 38.3 (06 Jun 2025 23:32)  T(F): 100.3 (07 Jun 2025 04:00), Max: 100.9 (06 Jun 2025 23:32)  HR: 54 (07 Jun 2025 07:10) (45 - 72)  BP: 115/58 (07 Jun 2025 07:00) (84/48 - 148/61)  BP(mean): 84 (07 Jun 2025 07:00) (62 - 91)  RR: 20 (07 Jun 2025 07:00) (20 - 50)  SpO2: 100% (07 Jun 2025 07:10) (89% - 100%)    Parameters below as of 07 Jun 2025 07:00  Patient On (Oxygen Delivery Method): ventilator    O2 Concentration (%): 40    Neurological Exam:   Patient on propofol and fentanyl  No gaze, pupils 1mm and minimal reactivity  Not following commands  No facial asymmetry  Tremulous movements at times  Spontaneous blinking limiting exam.   Gag reflex non discernable  Minimal movement to bilateral UE LE to peripheral noxious stimuli    Medications:  MEDICATIONS  (STANDING):  aspirin  chewable 81 milliGRAM(s) Oral daily  atorvastatin 80 milliGRAM(s) Oral at bedtime  chlorhexidine 0.12% Liquid 15 milliLiter(s) Oral Mucosa every 12 hours  chlorhexidine 2% Cloths 1 Application(s) Topical <User Schedule>  dextrose 5%. 1000 milliLiter(s) (50 mL/Hr) IV Continuous <Continuous>  dextrose 50% Injectable 25 Gram(s) IV Push once  fentaNYL   Infusion. 0.5 MICROgram(s)/kG/Hr (4.38 mL/Hr) IV Continuous <Continuous>  glucagon  Injectable 1 milliGRAM(s) IntraMuscular once  heparin  Infusion.  Unit(s)/Hr (16 mL/Hr) IV Continuous <Continuous>  insulin lispro (ADMELOG) corrective regimen sliding scale   SubCutaneous three times a day before meals  levETIRAcetam   Injectable 750 milliGRAM(s) IV Push every 12 hours  piperacillin/tazobactam IVPB.. 3.375 Gram(s) IV Intermittent every 8 hours  propofol Infusion 0.495 MICROgram(s)/kG/Min (0.26 mL/Hr) IV Continuous <Continuous>  senna 2 Tablet(s) Oral at bedtime  sodium zirconium cyclosilicate 10 Gram(s) Oral every 8 hours      Labs:  CBC Full  -  ( 07 Jun 2025 04:31 )  WBC Count : 11.73 K/uL  RBC Count : 2.84 M/uL  Hemoglobin : 8.0 g/dL  Hematocrit : 25.4 %  Platelet Count - Automated : 149 K/uL  Mean Cell Volume : 89.4 fL  Mean Cell Hemoglobin : 28.2 pg  Mean Cell Hemoglobin Concentration : 31.5 g/dL  Auto Neutrophil # : x  Auto Lymphocyte # : x  Auto Monocyte # : x  Auto Eosinophil # : x  Auto Basophil # : x  Auto Neutrophil % : x  Auto Lymphocyte % : x  Auto Monocyte % : x  Auto Eosinophil % : x  Auto Basophil % : x    06-06    142  |  107  |  39[H]  ----------------------------<  61[L]  4.8   |  24  |  2.2[H]    Ca    8.1[L]      06 Jun 2025 21:04  Phos  6.3     06-06  Mg     2.2     06-06    TPro  5.1[L]  /  Alb  3.1[L]  /  TBili  0.4  /  DBili  x   /  AST  36  /  ALT  40  /  AlkPhos  89  06-06    LIVER FUNCTIONS - ( 06 Jun 2025 16:49 )  Alb: 3.1 g/dL / Pro: 5.1 g/dL / ALK PHOS: 89 U/L / ALT: 40 U/L / AST: 36 U/L / GGT: x           PT/INR - ( 06 Jun 2025 16:49 )   PT: 14.50 sec;   INR: 1.22 ratio         PTT - ( 07 Jun 2025 04:22 )  PTT:>200.0 sec  Urinalysis Basic - ( 06 Jun 2025 21:04 )    Color: x / Appearance: x / SG: x / pH: x  Gluc: 61 mg/dL / Ketone: x  / Bili: x / Urobili: x   Blood: x / Protein: x / Nitrite: x   Leuk Esterase: x / RBC: x / WBC x   Sq Epi: x / Non Sq Epi: x / Bacteria: x    < from: CT Head No Cont (06.07.25 @ 10:20) >    IMPRESSION:  Examination is limited due to motion as well as streak artifact from   scalp leads.    Suggestion of global anoxic ischemic injury, not well evaluated due to   exam limitations. An MRI of the brain can be obtained for further   evaluation as clinically warranted.    --- End of Report ---    JOSIANE CHRISTENSEN MD; Attending Radiologist  This document has been electronically signed. Jun 7 2025 10:33AM    < end of copied text >      EEG CLASSIFICATION:   Findings: Abnormal.     Special findings: Burst-suppression pattern.     Seizure Classification: Tonic seizure generalized.    INTERPRETATION:   This is an abnormal EEG consistent with severe diffuse cerebral dysfunction.     Multiple brief tonic seizures of generalized onset.

## 2025-06-07 NOTE — PROGRESS NOTE ADULT - SUBJECTIVE AND OBJECTIVE BOX
SUBJECTIVE/OVERNIGHT EVENTS  Today is hospital day 1d. This morning patient was seen and examined at bedside, resting comfortably in bed. No acute or major events overnight. Patient evaluated for myoclonic jerks by neurology. Patient currently on propofol and fenatnyl. Ativan for seizure prophylaxis    HOSPITAL COURSE  Patient is a 69 year old male with PMH of DM, HTN, HLD, and CAD (CABG 2024) presenting post-cardiac arrest. Per EMS, 911 was called by family after patient had a witnessed cardiac arrest at home; EMS states they arrived to the home shortly thereafter (approximately 5 minutes); initial rhythm PEA, patient intubated in the field, and ROSC after two rounds of CPR with Epi x2 given.   Spoke with wife, patient was fine all day, before he had the arrest he was lying down, complained of SOB them passed out.     Of note, patient was discharged from the hospital two days ago after experiencing an acute flash pulmonary event and hypertensive emergency.      MEDICATIONS  STANDING MEDICATIONS  aspirin  chewable 81 milliGRAM(s) Oral daily  atorvastatin 80 milliGRAM(s) Oral at bedtime  chlorhexidine 0.12% Liquid 15 milliLiter(s) Oral Mucosa every 12 hours  chlorhexidine 2% Cloths 1 Application(s) Topical <User Schedule>  dextrose 5%. 1000 milliLiter(s) IV Continuous <Continuous>  dextrose 50% Injectable 25 Gram(s) IV Push once  fentaNYL   Infusion. 0.5 MICROgram(s)/kG/Hr IV Continuous <Continuous>  glucagon  Injectable 1 milliGRAM(s) IntraMuscular once  heparin  Infusion.  Unit(s)/Hr IV Continuous <Continuous>  insulin lispro (ADMELOG) corrective regimen sliding scale   SubCutaneous three times a day before meals  levETIRAcetam   Injectable 750 milliGRAM(s) IV Push every 12 hours  piperacillin/tazobactam IVPB.. 3.375 Gram(s) IV Intermittent every 8 hours  propofol Infusion 0.495 MICROgram(s)/kG/Min IV Continuous <Continuous>  senna 2 Tablet(s) Oral at bedtime  sodium zirconium cyclosilicate 10 Gram(s) Oral every 8 hours    PRN MEDICATIONS  dextrose Oral Gel 15 Gram(s) Oral once PRN  heparin   Injectable 7000 Unit(s) IV Push every 6 hours PRN  heparin   Injectable 3500 Unit(s) IV Push every 6 hours PRN  polyethylene glycol 3350 17 Gram(s) Oral two times a day PRN    VITALS  T(F): 100.3 (06-07-25 @ 04:00), Max: 100.9 (06-06-25 @ 23:32)  HR: 57 (06-07-25 @ 04:00) (45 - 72)  BP: 106/51 (06-07-25 @ 04:00) (84/48 - 148/61)  RR: 27 (06-07-25 @ 04:00) (16 - 50)  SpO2: 100% (06-07-25 @ 04:00) (89% - 100%)  POCT Blood Glucose.: 86 mg/dL (06-06-25 @ 23:38)  POCT Blood Glucose.: 83 mg/dL (06-06-25 @ 18:32)  POCT Blood Glucose.: 68 mg/dL (06-06-25 @ 17:29)  POCT Blood Glucose.: 230 mg/dL (06-06-25 @ 10:39)  POCT Blood Glucose.: 339 mg/dL (06-06-25 @ 06:27)    PHYSICAL EXAM  GENERAL  GENERAL: NAD, lying in bed comfortably  HEAD:  Atraumatic, normocephalic  EYES: EOMI, PERRL  NECK: Supple, trachea midline, no JVD  HEART: Regular rate and rhythm  LUNGS: Unlabored respirations.  Clear to auscultation bilaterally, no crackles, wheezing, or rhonchi  ABDOMEN: Soft, nontender, nondistended, +BS  EXTREMITIES: 2+ peripheral pulses bilaterally. No clubbing, cyanosis, or edema  NERVOUS SYSTEM:  A&Ox3, moving all extremities, no focal deficits     LABS             8.9    9.33  )-----------( 141      ( 06-06-25 @ 16:49 )             28.4     142  |  107  |  39  -------------------------<  61   06-06-25 @ 21:04  4.8  |  24  |  2.2    Ca      8.1     06-06-25 @ 21:04  Phos   6.3     06-06-25 @ 01:42  Mg     2.2     06-06-25 @ 21:04    TPro  5.1  /  Alb  3.1  /  TBili  0.4  /  DBili  x   /  AST  36  /  ALT  40  /  AlkPhos  89  /  GGT  x     06-06-25 @ 16:49    PT/INR - ( 06-06-25 @ 16:49 )   PT: 14.50 sec[H];   INR: 1.22 ratio  PTT - ( 06-07-25 @ 04:22 )  PTT:>200.0 sec    Troponin T, High Sensitivity Result: 135 ng/L (06-06-25 @ 08:27)  Troponin T, High Sensitivity Result: 100 ng/L (06-06-25 @ 01:42)  Pro-Brain Natriuretic Peptide: 2375 pg/mL (06-06-25 @ 01:42)    Urinalysis Basic - ( 06 Jun 2025 21:04 )    Color: x / Appearance: x / SG: x / pH: x  Gluc: 61 mg/dL / Ketone: x  / Bili: x / Urobili: x   Blood: x / Protein: x / Nitrite: x   Leuk Esterase: x / RBC: x / WBC x   Sq Epi: x / Non Sq Epi: x / Bacteria: x      ABG - ( 06 Jun 2025 09:36 )  pH, Arterial: 7.43  pH, Blood: x     /  pCO2: 35    /  pO2: 129   / HCO3: 23    / Base Excess: -0.6  /  SaO2: incalc         IMAGING        ECG: sinus latoya with nonspecific ST changes     CT HEAD:  No evidence of acute intracranial pathology.    CT CERVICAL SPINE:  No evidence of acute cervical spine traumatic injury.    CT chest abdomen pelvis:    1.  No evidence for central or segmental pulmonary emboli.  2.  Left lower lobe consolidation, which may represent pneumonia in appropriate clinical setting.  3.  Mucosal hyperemia and mild mural thickening of the ascending, transverse, descending colon, possibly reflecting sequelae of hypoperfusion.  4.  Moderate right and small left pleural effusions.  5.  Mildly displaced left L3 transverse process fracture.  6.  Nondisplaced fractures of right ribs 3 through 6. Nondisplaced fractures of left ribs 4 through 6.  7.  Focus of air within the bladder. Correlate for history of instrumentation.     SUBJECTIVE/OVERNIGHT EVENTS  Today is hospital day 1d. This morning patient was seen and examined at bedside, resting comfortably in bed. No acute or major events overnight. Patient evaluated for myoclonic jerks by neurology. Patient currently on propofol and fenatnyl. Ativan for seizure prophylaxis    HOSPITAL COURSE  Patient is a 69 year old male with PMH of DM, HTN, HLD, and CAD (CABG 2024) presenting post-cardiac arrest. Per EMS, 911 was called by family after patient had a witnessed cardiac arrest at home; EMS states they arrived to the home shortly thereafter (approximately 5 minutes); initial rhythm PEA, patient intubated in the field, and ROSC after two rounds of CPR with Epi x2 given.   Spoke with wife, patient was fine all day, before he had the arrest he was lying down, complained of SOB them passed out.     Of note, patient was discharged from the hospital two days ago after experiencing an acute flash pulmonary event and hypertensive emergency.      MEDICATIONS  STANDING MEDICATIONS  aspirin  chewable 81 milliGRAM(s) Oral daily  atorvastatin 80 milliGRAM(s) Oral at bedtime  chlorhexidine 0.12% Liquid 15 milliLiter(s) Oral Mucosa every 12 hours  chlorhexidine 2% Cloths 1 Application(s) Topical <User Schedule>  dextrose 5%. 1000 milliLiter(s) IV Continuous <Continuous>  dextrose 50% Injectable 25 Gram(s) IV Push once  fentaNYL   Infusion. 0.5 MICROgram(s)/kG/Hr IV Continuous <Continuous>  glucagon  Injectable 1 milliGRAM(s) IntraMuscular once  heparin  Infusion.  Unit(s)/Hr IV Continuous <Continuous>  insulin lispro (ADMELOG) corrective regimen sliding scale   SubCutaneous three times a day before meals  levETIRAcetam   Injectable 750 milliGRAM(s) IV Push every 12 hours  piperacillin/tazobactam IVPB.. 3.375 Gram(s) IV Intermittent every 8 hours  propofol Infusion 0.495 MICROgram(s)/kG/Min IV Continuous <Continuous>  senna 2 Tablet(s) Oral at bedtime  sodium zirconium cyclosilicate 10 Gram(s) Oral every 8 hours    PRN MEDICATIONS  dextrose Oral Gel 15 Gram(s) Oral once PRN  heparin   Injectable 7000 Unit(s) IV Push every 6 hours PRN  heparin   Injectable 3500 Unit(s) IV Push every 6 hours PRN  polyethylene glycol 3350 17 Gram(s) Oral two times a day PRN    VITALS  T(F): 100.3 (06-07-25 @ 04:00), Max: 100.9 (06-06-25 @ 23:32)  HR: 57 (06-07-25 @ 04:00) (45 - 72)  BP: 106/51 (06-07-25 @ 04:00) (84/48 - 148/61)  RR: 27 (06-07-25 @ 04:00) (16 - 50)  SpO2: 100% (06-07-25 @ 04:00) (89% - 100%)  POCT Blood Glucose.: 86 mg/dL (06-06-25 @ 23:38)  POCT Blood Glucose.: 83 mg/dL (06-06-25 @ 18:32)  POCT Blood Glucose.: 68 mg/dL (06-06-25 @ 17:29)  POCT Blood Glucose.: 230 mg/dL (06-06-25 @ 10:39)  POCT Blood Glucose.: 339 mg/dL (06-06-25 @ 06:27)    PHYSICAL EXAM  GENERAL  GENERAL: NAD, lying in bed comfortably  HEAD:  Atraumatic, normocephalic  EYES: EOMI, PERRL  NECK: Supple, trachea midline, no JVD  HEART: Regular rate and rhythm  LUNGS: Unlabored respirations.  Clear to auscultation bilaterally, no crackles, wheezing, or rhonchi  ABDOMEN: Soft, nontender, nondistended, +BS  EXTREMITIES: 2+ peripheral pulses bilaterally. No clubbing, cyanosis, or edema  NERVOUS SYSTEM:  A&Ox3, moving all extremities, no focal deficits     LABS             8.9    9.33  )-----------( 141      ( 06-06-25 @ 16:49 )             28.4     142  |  107  |  39  -------------------------<  61   06-06-25 @ 21:04  4.8  |  24  |  2.2    Ca      8.1     06-06-25 @ 21:04  Phos   6.3     06-06-25 @ 01:42  Mg     2.2     06-06-25 @ 21:04    TPro  5.1  /  Alb  3.1  /  TBili  0.4  /  DBili  x   /  AST  36  /  ALT  40  /  AlkPhos  89  /  GGT  x     06-06-25 @ 16:49    PT/INR - ( 06-06-25 @ 16:49 )   PT: 14.50 sec[H];   INR: 1.22 ratio  PTT - ( 06-07-25 @ 04:22 )  PTT:>200.0 sec    Troponin T, High Sensitivity Result: 135 ng/L (06-06-25 @ 08:27)  Troponin T, High Sensitivity Result: 100 ng/L (06-06-25 @ 01:42)  Pro-Brain Natriuretic Peptide: 2375 pg/mL (06-06-25 @ 01:42)    Urinalysis Basic - ( 06 Jun 2025 21:04 )    Color: x / Appearance: x / SG: x / pH: x  Gluc: 61 mg/dL / Ketone: x  / Bili: x / Urobili: x   Blood: x / Protein: x / Nitrite: x   Leuk Esterase: x / RBC: x / WBC x   Sq Epi: x / Non Sq Epi: x / Bacteria: x      ABG - ( 06 Jun 2025 09:36 )  pH, Arterial: 7.43  pH, Blood: x     /  pCO2: 35    /  pO2: 129   / HCO3: 23    / Base Excess: -0.6  /  SaO2: incalc       Mode: AC/ CMV (Assist Control/ Continuous Mandatory Ventilation)  RR (machine): 20  TV (machine): 440  FiO2: 40  PEEP: 8  ITime: 0.9  MAP: 14  PIP: 26        06-06 @ 07:01  -  06-07 @ 07:00  --------------------------------------------------------  IN: 1695.6 mL / OUT: 860 mL / NET: 835.6 mL        IMAGING        ECG: sinus latoya with nonspecific ST changes     CT HEAD:  No evidence of acute intracranial pathology.    CT CERVICAL SPINE:  No evidence of acute cervical spine traumatic injury.    CT chest abdomen pelvis:    1.  No evidence for central or segmental pulmonary emboli.  2.  Left lower lobe consolidation, which may represent pneumonia in appropriate clinical setting.  3.  Mucosal hyperemia and mild mural thickening of the ascending, transverse, descending colon, possibly reflecting sequelae of hypoperfusion.  4.  Moderate right and small left pleural effusions.  5.  Mildly displaced left L3 transverse process fracture.  6.  Nondisplaced fractures of right ribs 3 through 6. Nondisplaced fractures of left ribs 4 through 6.  7.  Focus of air within the bladder. Correlate for history of instrumentation.

## 2025-06-07 NOTE — PROGRESS NOTE ADULT - ASSESSMENT
Impression:  Patient is a 69 year old male with PMH of DM, HTN, HLD, and CAD (CABG 2024) presenting post-cardiac arrest. Per EMS, 911 was called by family after patient had a witnessed cardiac arrest at home; EMS states they arrived to the home shortly thereafter (approximately 5 minutes); Neurology consulted for myoclonic jerks. EEG showing generalized spikes that reportedly is associated with myoclonus.   Suspect underlying anoxic injury. Overnight no reported events.     Plan:  - Seizure/Fall/Aspiration precautions  - Goal Pox >92%  - maintain K > 4.0- 4.9 and Mg > 2.0-2.5  - avoid carbapenems, 4th gen cephalosporins, fluoroquinolones  - F/u vEEG continuous monitoring  - Continue keppra 750mg BID  - maximize propofol for now  - consider switch fentanyl to precedex  - C/w Ativan 2mg PRN for seizure activity >2min             Impression:  Patient is a 69 year old male with PMH of DM, HTN, HLD, and CAD (CABG 2024) presenting post-cardiac arrest. Per EMS, 911 was called by family after patient had a witnessed cardiac arrest at home; EMS states they arrived to the home shortly thereafter (approximately 5 minutes); Neurology consulted for myoclonic jerks. EEG showing generalized spikes that reportedly is associated with myoclonus.   Suspect underlying anoxic injury. Overnight no reported events. Burst suppression and GTC seizures on VEEG.    Plan:  - F/u vEEG continuous monitoring  - Continue Keppra and please increase to 1500 mg BID  - optimize propofol for now  - consider switch fentanyl to precedex  - Seizure/Fall/Aspiration precautions  - avoid carbapenems, 4th gen cephalosporins, fluoroquinolones  - maintain K > 4.0- 4.9 and Mg > 2.0-2.5  - C/w Ativan 2mg PRN for seizure activity >2min  - GOC, prognosis poor           Impression:  Patient is a 69 year old male with PMH of DM, HTN, HLD, and CAD (CABG 2024) presenting post-cardiac arrest. Per EMS, 911 was called by family after patient had a witnessed cardiac arrest at home; EMS states they arrived to the home shortly thereafter (approximately 5 minutes); Neurology consulted for myoclonic jerks. EEG showing generalized spikes that reportedly is associated with myoclonus likely secondary to underlying anoxic injury. Overnight no reported events. Burst suppression and GTC seizures on VEEG.    Plan:  - F/u vEEG continuous monitoring  - Continue Keppra and please increase to 1500 mg BID  - prognosis for meaningful recovery likely poor  - address GOCs w/ family

## 2025-06-07 NOTE — PROGRESS NOTE ADULT - SUBJECTIVE AND OBJECTIVE BOX
Patient is a 69y old  Male who presents with a chief complaint of cardiac arrest (07 Jun 2025 05:55)        Over Night Events:        ROS:     All ROS are negative except HPI         PHYSICAL EXAM    ICU Vital Signs Last 24 Hrs  T(C): 37.9 (07 Jun 2025 04:00), Max: 38.3 (06 Jun 2025 23:32)  T(F): 100.3 (07 Jun 2025 04:00), Max: 100.9 (06 Jun 2025 23:32)  HR: 53 (07 Jun 2025 06:00) (45 - 72)  BP: 106/53 (07 Jun 2025 06:00) (84/48 - 148/61)  BP(mean): 77 (07 Jun 2025 06:00) (62 - 91)  ABP: --  ABP(mean): --  RR: 20 (07 Jun 2025 06:00) (20 - 50)  SpO2: 98% (07 Jun 2025 06:00) (89% - 100%)    O2 Parameters below as of 07 Jun 2025 06:00  Patient On (Oxygen Delivery Method): ventilator  O2 Flow (L/min): 40          CONSTITUTIONAL:  Well nourished.  NAD    ENT:   Airway patent,   Mouth with normal mucosa.   No thrush    EYES:   Pupils equal,   Round and reactive to light.    CARDIAC:   Normal rate,   Regular rhythm.    No edema      Vascular:  Normal systolic impulse  No Carotid bruits    RESPIRATORY:   No wheezing  Bilateral BS  Normal chest expansion  Not tachypneic,  No use of accessory muscles    GASTROINTESTINAL:  Abdomen soft,   Non-tender,   No guarding,   + BS    MUSCULOSKELETAL:   Range of motion is not limited,  No clubbing, cyanosis    NEUROLOGICAL:   Alert and oriented   No motor  deficits.    SKIN:   Skin normal color for race,   Warm and dry and intact.   No evidence of rash.    PSYCHIATRIC:   Normal mood and affect.   No apparent risk to self or others.    HEMATOLOGICAL:  No cervical  lymphadenopathy.  no inguinal lymphadenopathy      06-06-25 @ 07:01  -  06-07-25 @ 07:00  --------------------------------------------------------  IN:    FentaNYL: 27 mL    FentaNYL: 585.8 mL    Heparin Infusion: 228 mL    IV PiggyBack: 300 mL    Propofol: 15.8 mL    Propofol: 539 mL  Total IN: 1695.6 mL    OUT:    Indwelling Catheter - Urethral (mL): 860 mL  Total OUT: 860 mL    Total NET: 835.6 mL          LABS:                            8.9    9.33  )-----------( 141      ( 06 Jun 2025 16:49 )             28.4                                               06-06    142  |  107  |  39[H]  ----------------------------<  61[L]  4.8   |  24  |  2.2[H]    Ca    8.1[L]      06 Jun 2025 21:04  Phos  6.3     06-06  Mg     2.2     06-06    TPro  5.1[L]  /  Alb  3.1[L]  /  TBili  0.4  /  DBili  x   /  AST  36  /  ALT  40  /  AlkPhos  89  06-06      PT/INR - ( 06 Jun 2025 16:49 )   PT: 14.50 sec;   INR: 1.22 ratio         PTT - ( 07 Jun 2025 04:22 )  PTT:>200.0 sec                                       Urinalysis Basic - ( 06 Jun 2025 21:04 )    Color: x / Appearance: x / SG: x / pH: x  Gluc: 61 mg/dL / Ketone: x  / Bili: x / Urobili: x   Blood: x / Protein: x / Nitrite: x   Leuk Esterase: x / RBC: x / WBC x   Sq Epi: x / Non Sq Epi: x / Bacteria: x                                                  LIVER FUNCTIONS - ( 06 Jun 2025 16:49 )  Alb: 3.1 g/dL / Pro: 5.1 g/dL / ALK PHOS: 89 U/L / ALT: 40 U/L / AST: 36 U/L / GGT: x                                                  Culture - Blood (collected 06 Jun 2025 02:50)  Source: Blood Blood  Preliminary Report (07 Jun 2025 07:02):    No growth at 24 hours                                                   Mode: AC/ CMV (Assist Control/ Continuous Mandatory Ventilation)  RR (machine): 20  TV (machine): 440  FiO2: 40  PEEP: 8  ITime: 0.9  MAP: 14  PIP: 26                                      ABG - ( 06 Jun 2025 09:36 )  pH, Arterial: 7.43  pH, Blood: x     /  pCO2: 35    /  pO2: 129   / HCO3: 23    / Base Excess: -0.6  /  SaO2: incalc               MEDICATIONS  (STANDING):  aspirin  chewable 81 milliGRAM(s) Oral daily  atorvastatin 80 milliGRAM(s) Oral at bedtime  chlorhexidine 0.12% Liquid 15 milliLiter(s) Oral Mucosa every 12 hours  chlorhexidine 2% Cloths 1 Application(s) Topical <User Schedule>  dextrose 5%. 1000 milliLiter(s) (50 mL/Hr) IV Continuous <Continuous>  dextrose 50% Injectable 25 Gram(s) IV Push once  fentaNYL   Infusion. 0.5 MICROgram(s)/kG/Hr (4.38 mL/Hr) IV Continuous <Continuous>  glucagon  Injectable 1 milliGRAM(s) IntraMuscular once  heparin  Infusion.  Unit(s)/Hr (16 mL/Hr) IV Continuous <Continuous>  insulin lispro (ADMELOG) corrective regimen sliding scale   SubCutaneous three times a day before meals  levETIRAcetam   Injectable 750 milliGRAM(s) IV Push every 12 hours  piperacillin/tazobactam IVPB.. 3.375 Gram(s) IV Intermittent every 8 hours  propofol Infusion 0.495 MICROgram(s)/kG/Min (0.26 mL/Hr) IV Continuous <Continuous>  senna 2 Tablet(s) Oral at bedtime  sodium zirconium cyclosilicate 10 Gram(s) Oral every 8 hours    MEDICATIONS  (PRN):  dextrose Oral Gel 15 Gram(s) Oral once PRN Blood Glucose LESS THAN 70 milliGRAM(s)/deciliter  heparin   Injectable 7000 Unit(s) IV Push every 6 hours PRN For aPTT less than 40  heparin   Injectable 3500 Unit(s) IV Push every 6 hours PRN For aPTT between 40 - 57  polyethylene glycol 3350 17 Gram(s) Oral two times a day PRN Constipation         Patient is a 69y old  Male who presents with a chief complaint of cardiac arrest (07 Jun 2025 05:55)  Hx of CAD and cabg  intubated in field brought in     of note: had been in ccu for pulm ying tx, and dc'd home  2 days later had cardiac arrest following complaints of sob  He had also been on beta blocker        Over Night Events:  Seen being here he has had EP- evaluate for latoya, states that latoya is unlikely the cause of arrest  He may have had aspiration event - unclear  also noted myoclonus - confirmed on eeg, now on keppra with neuro following   fever 100.9        ROS:     All ROS are negative except HPI         PHYSICAL EXAM    ICU Vital Signs Last 24 Hrs  T(C): 37.9 (07 Jun 2025 04:00), Max: 38.3 (06 Jun 2025 23:32)  T(F): 100.3 (07 Jun 2025 04:00), Max: 100.9 (06 Jun 2025 23:32)  HR: 53 (07 Jun 2025 06:00) (45 - 72)  BP: 106/53 (07 Jun 2025 06:00) (84/48 - 148/61)  BP(mean): 77 (07 Jun 2025 06:00) (62 - 91)  ABP: --  ABP(mean): --  RR: 20 (07 Jun 2025 06:00) (20 - 50)  SpO2: 98% (07 Jun 2025 06:00) (89% - 100%)    O2 Parameters below as of 07 Jun 2025 06:00  Patient On (Oxygen Delivery Method): ventilator  O2 Flow (L/min): 40          CONSTITUTIONAL:  .  NAD    ENT:   Airway patent,   Mouth with normal mucosa.   No thrush    EYES:   Pupils equal,   Round and reactive to light.    CARDIAC:   Normal rate,   Regular rhythm.    No edema        RESPIRATORY:   No wheezing  Bilateral BS  No use of accessory muscles    GASTROINTESTINAL:  Abdomen soft,   Non-tender,   No guarding,   + BS    MUSCULOSKELETAL:   Range of motion is not limited,  No clubbing, cyanosis        SKIN:   Skin normal color for race,   Warm and dry and intact.   No evidence of rash.          06-06-25 @ 07:01  -  06-07-25 @ 07:00  --------------------------------------------------------  IN:    FentaNYL: 27 mL    FentaNYL: 585.8 mL    Heparin Infusion: 228 mL    IV PiggyBack: 300 mL    Propofol: 15.8 mL    Propofol: 539 mL  Total IN: 1695.6 mL    OUT:    Indwelling Catheter - Urethral (mL): 860 mL  Total OUT: 860 mL    Total NET: 835.6 mL          LABS:                            8.9    9.33  )-----------( 141      ( 06 Jun 2025 16:49 )             28.4                                               06-06    142  |  107  |  39[H]  ----------------------------<  61[L]  4.8   |  24  |  2.2[H]    Ca    8.1[L]      06 Jun 2025 21:04  Phos  6.3     06-06  Mg     2.2     06-06    TPro  5.1[L]  /  Alb  3.1[L]  /  TBili  0.4  /  DBili  x   /  AST  36  /  ALT  40  /  AlkPhos  89  06-06      PT/INR - ( 06 Jun 2025 16:49 )   PT: 14.50 sec;   INR: 1.22 ratio         PTT - ( 07 Jun 2025 04:22 )  PTT:>200.0 sec                                       Urinalysis Basic - ( 06 Jun 2025 21:04 )    Color: x / Appearance: x / SG: x / pH: x  Gluc: 61 mg/dL / Ketone: x  / Bili: x / Urobili: x   Blood: x / Protein: x / Nitrite: x   Leuk Esterase: x / RBC: x / WBC x   Sq Epi: x / Non Sq Epi: x / Bacteria: x                                                  LIVER FUNCTIONS - ( 06 Jun 2025 16:49 )  Alb: 3.1 g/dL / Pro: 5.1 g/dL / ALK PHOS: 89 U/L / ALT: 40 U/L / AST: 36 U/L / GGT: x                                                  Culture - Blood (collected 06 Jun 2025 02:50)  Source: Blood Blood  Preliminary Report (07 Jun 2025 07:02):    No growth at 24 hours                                                   Mode: AC/ CMV (Assist Control/ Continuous Mandatory Ventilation)  RR (machine): 20  TV (machine): 440  FiO2: 40  PEEP: 8  ITime: 0.9  MAP: 14  PIP: 26                                      ABG - ( 06 Jun 2025 09:36 )  pH, Arterial: 7.43  pH, Blood: x     /  pCO2: 35    /  pO2: 129   / HCO3: 23    / Base Excess: -0.6  /  SaO2: incalc               MEDICATIONS  (STANDING):  aspirin  chewable 81 milliGRAM(s) Oral daily  atorvastatin 80 milliGRAM(s) Oral at bedtime  chlorhexidine 0.12% Liquid 15 milliLiter(s) Oral Mucosa every 12 hours  chlorhexidine 2% Cloths 1 Application(s) Topical <User Schedule>  dextrose 5%. 1000 milliLiter(s) (50 mL/Hr) IV Continuous <Continuous>  dextrose 50% Injectable 25 Gram(s) IV Push once  fentaNYL   Infusion. 0.5 MICROgram(s)/kG/Hr (4.38 mL/Hr) IV Continuous <Continuous>  glucagon  Injectable 1 milliGRAM(s) IntraMuscular once  heparin  Infusion.  Unit(s)/Hr (16 mL/Hr) IV Continuous <Continuous>  insulin lispro (ADMELOG) corrective regimen sliding scale   SubCutaneous three times a day before meals  levETIRAcetam   Injectable 750 milliGRAM(s) IV Push every 12 hours  piperacillin/tazobactam IVPB.. 3.375 Gram(s) IV Intermittent every 8 hours  propofol Infusion 0.495 MICROgram(s)/kG/Min (0.26 mL/Hr) IV Continuous <Continuous>  senna 2 Tablet(s) Oral at bedtime  sodium zirconium cyclosilicate 10 Gram(s) Oral every 8 hours    MEDICATIONS  (PRN):  dextrose Oral Gel 15 Gram(s) Oral once PRN Blood Glucose LESS THAN 70 milliGRAM(s)/deciliter  heparin   Injectable 7000 Unit(s) IV Push every 6 hours PRN For aPTT less than 40  heparin   Injectable 3500 Unit(s) IV Push every 6 hours PRN For aPTT between 40 - 57  polyethylene glycol 3350 17 Gram(s) Oral two times a day PRN Constipation

## 2025-06-07 NOTE — EEG REPORT - EVENT1DETAILS
Multiple episodes of tonic extension arms with eye opening. EEG shows diffuse Gamma followed by polyspikes up to 6 seconds preceding clinical event. Return to diffuse suppression at time of clinical event.

## 2025-06-08 NOTE — PROGRESS NOTE ADULT - ASSESSMENT
Impression:  Patient is a 69 year old male with PMH of DM, HTN, HLD, and CAD (CABG 2024) presenting post-cardiac arrest. Per EMS, 911 was called by family after patient had a witnessed cardiac arrest at home; EMS states they arrived to the home shortly thereafter (approximately 5 minutes); Neurology consulted for myoclonic jerks. EEG showing generalized spikes that reportedly is associated with myoclonus likely secondary to underlying anoxic injury. The previous 24 hours revealed burst suppression and GTC seizures on VEEG, keppra was increased.    Plan:  - F/u vEEG continuous monitoring  - Continue Keppra  - prognosis for meaningful recovery likely poor  - address GOCs w/ family   Impression:  Patient is a 69 year old male with PMH of DM, HTN, HLD, and CAD (CABG 2024) presenting post-cardiac arrest. Per EMS, 911 was called by family after patient had a witnessed cardiac arrest at home; EMS states they arrived to the home shortly thereafter (approximately 5 minutes); Neurology consulted for myoclonic jerks. EEG showing generalized spikes that reportedly is associated with myoclonus likely secondary to underlying anoxic injury. The previous 24 hours revealed burst suppression and GTC seizures on VEEG, keppra was increased. Burst suppression improved since yesterday.    Plan:  - F/u vEEG continuous monitoring  - Continue Keppra  - prognosis for meaningful recovery likely poor  - address GOCs w/ family   Impression:  Patient is a 69 year old male with PMH of DM, HTN, HLD, and CAD (CABG 2024) presenting post-cardiac arrest. Per EMS, 911 was called by family after patient had a witnessed cardiac arrest at home; EMS states they arrived to the home shortly thereafter (approximately 5 minutes); Neurology consulted for myoclonic jerks. EEG showing generalized spikes that reportedly is associated with myoclonus likely secondary to underlying anoxic injury. The previous 24 hours revealed burst suppression and GTC seizures on VEEG, keppra was increased. Burst suppression improved since yesterday.    Plan:  - vEEG continuous monitoring  - Continue Keppra  - Wean sedation  - prognosis for meaningful recovery likely poor  - address GOCs w/ family   Impression:  Patient is a 69 year old male with PMH of DM, HTN, HLD, and CAD (CABG 2024) presenting post-cardiac arrest. Per EMS, 911 was called by family after patient had a witnessed cardiac arrest at home; EMS states they arrived to the home shortly thereafter (approximately 5 minutes); Neurology consulted for myoclonic jerks. EEG showing generalized spikes that reportedly is associated with myoclonus likely secondary to underlying anoxic injury. The previous 24 hours revealed burst suppression and GTC seizures on VEEG, keppra was increased. Burst suppression improved since yesterday.    Plan:  - vEEG continuous monitoring  - Continue Keppra 1500 Q12  - Wean sedation  - prognosis for meaningful recovery likely poor  - address GOCs w/ family  - supportive care as per ICU team

## 2025-06-08 NOTE — PROGRESS NOTE ADULT - SUBJECTIVE AND OBJECTIVE BOX
Neurology Progress Note    Interval History:    Patient is a 69 year old male with PMH of DM, HTN, HLD, and CAD (CABG 2024) presenting post-cardiac arrest. Per EMS, 911 was called by family after patient had a witnessed cardiac arrest at home; EMS states they arrived to the home shortly thereafter (approximately 5 minutes); initial rhythm PEA, patient intubated in the field, and ROSC after two rounds of CPR with Epi x2 given.   Spoke with wife, patient was fine all day, before he had the arrest he was lying down, complained of SOB them passed out.     Of note, patient was discharged from the hospital two days ago after experiencing an acute flash pulmonary event and hypertensive emergency.    In ED:  /73, HR 66, Temp 95.5, Intubated  wbc 13.4  hb 11.6  plt 232  Na 139, K 5.4  Crea 1.8 (at baseline)   AG 19  Troponin 100   AST 98, ALT 66  Lactate 5.5     ECG: sinus latoya with nonspecific ST changes     CT HEAD:  No evidence of acute intracranial pathology.    CT CERVICAL SPINE:  No evidence of acute cervical spine traumatic injury.    CT chest abdomen pelvis:    1.  No evidence for central or segmental pulmonary emboli.  2.  Left lower lobe consolidation, which may represent pneumonia in appropriate clinical setting.  3.  Mucosal hyperemia and mild mural thickening of the ascending, transverse, descending colon, possibly reflecting sequelae of hypoperfusion.  4.  Moderate right and small left pleural effusions.  5.  Mildly displaced left L3 transverse process fracture.  6.  Nondisplaced fractures of right ribs 3 through 6. Nondisplaced fractures of left ribs 4 through 6.  7.  Focus of air within the bladder. Correlate for history of instrumentation.   (06 Jun 2025 02:51)        Vital Signs Last 24 Hrs  T(C): 37.8 (08 Jun 2025 04:00), Max: 38.3 (07 Jun 2025 10:30)  T(F): 100.1 (08 Jun 2025 04:00), Max: 101 (07 Jun 2025 10:30)  HR: 49 (08 Jun 2025 07:10) (46 - 61)  BP: 120/59 (08 Jun 2025 07:00) (105/52 - 133/61)  BP(mean): 85 (08 Jun 2025 07:00) (74 - 88)  RR: 20 (08 Jun 2025 07:00) (20 - 26)  SpO2: 100% (08 Jun 2025 07:10) (100% - 100%)    Parameters below as of 08 Jun 2025 04:00  Patient On (Oxygen Delivery Method): ventilator    O2 Concentration (%): 40    Neurological Exam:   Patient on propofol and fentanyl  No gaze, pupils 2mm and minimal reactivity  Not following commands  No facial asymmetry  Tremulous movements at times  Spontaneous blinking limiting corneal exam.   Gag reflex non discernable  Minimal movement to bilateral UE LE to peripheral noxious stimuli        Medications:  MEDICATIONS  (STANDING):  aspirin  chewable 81 milliGRAM(s) Oral daily  atorvastatin 80 milliGRAM(s) Oral at bedtime  chlorhexidine 0.12% Liquid 15 milliLiter(s) Oral Mucosa every 12 hours  chlorhexidine 2% Cloths 1 Application(s) Topical <User Schedule>  dextrose 5%. 1000 milliLiter(s) (50 mL/Hr) IV Continuous <Continuous>  dextrose 50% Injectable 25 Gram(s) IV Push once  fentaNYL   Infusion. 0.5 MICROgram(s)/kG/Hr (4.38 mL/Hr) IV Continuous <Continuous>  glucagon  Injectable 1 milliGRAM(s) IntraMuscular once  heparin  Infusion.  Unit(s)/Hr (16 mL/Hr) IV Continuous <Continuous>  insulin lispro (ADMELOG) corrective regimen sliding scale   SubCutaneous three times a day before meals  lactated ringers. 500 milliLiter(s) (100 mL/Hr) IV Continuous <Continuous>  levETIRAcetam   Injectable 1500 milliGRAM(s) IV Push every 12 hours  piperacillin/tazobactam IVPB.. 3.375 Gram(s) IV Intermittent every 8 hours  propofol Infusion 0.495 MICROgram(s)/kG/Min (0.26 mL/Hr) IV Continuous <Continuous>  senna 2 Tablet(s) Oral at bedtime  sodium zirconium cyclosilicate 10 Gram(s) Oral every 8 hours      Labs:  CBC Full  -  ( 08 Jun 2025 04:25 )  WBC Count : 9.15 K/uL  RBC Count : 3.04 M/uL  Hemoglobin : 8.7 g/dL  Hematocrit : 27.1 %  Platelet Count - Automated : 125 K/uL  Mean Cell Volume : 89.1 fL  Mean Cell Hemoglobin : 28.6 pg  Mean Cell Hemoglobin Concentration : 32.1 g/dL  Auto Neutrophil # : x  Auto Lymphocyte # : x  Auto Monocyte # : x  Auto Eosinophil # : x  Auto Basophil # : x  Auto Neutrophil % : x  Auto Lymphocyte % : x  Auto Monocyte % : x  Auto Eosinophil % : x  Auto Basophil % : x    06-08    134[L]  |  101  |  46[H]  ----------------------------<  91  4.3   |  18  |  2.9[H]    Ca    7.5[L]      08 Jun 2025 04:25  Mg     2.2     06-06    TPro  4.9[L]  /  Alb  3.0[L]  /  TBili  0.5  /  DBili  x   /  AST  35  /  ALT  27  /  AlkPhos  112  06-08    LIVER FUNCTIONS - ( 08 Jun 2025 04:25 )  Alb: 3.0 g/dL / Pro: 4.9 g/dL / ALK PHOS: 112 U/L / ALT: 27 U/L / AST: 35 U/L / GGT: x           PT/INR - ( 07 Jun 2025 22:51 )   PT: 18.10 sec;   INR: 1.52 ratio         PTT - ( 07 Jun 2025 22:51 )  PTT:>200.0 sec  Urinalysis Basic - ( 08 Jun 2025 04:25 )    Color: x / Appearance: x / SG: x / pH: x  Gluc: 91 mg/dL / Ketone: x  / Bili: x / Urobili: x   Blood: x / Protein: x / Nitrite: x   Leuk Esterase: x / RBC: x / WBC x   Sq Epi: x / Non Sq Epi: x / Bacteria: x     Neurology Progress Note    Interval History:    Patient is a 69 year old male with PMH of DM, HTN, HLD, and CAD (CABG 2024) presenting post-cardiac arrest. Per EMS, 911 was called by family after patient had a witnessed cardiac arrest at home; EMS states they arrived to the home shortly thereafter (approximately 5 minutes); initial rhythm PEA, patient intubated in the field, and ROSC after two rounds of CPR with Epi x2 given.   Spoke with wife, patient was fine all day, before he had the arrest he was lying down, complained of SOB them passed out.     Of note, patient was discharged from the hospital two days ago after experiencing an acute flash pulmonary event and hypertensive emergency.    In ED:  /73, HR 66, Temp 95.5, Intubated  wbc 13.4  hb 11.6  plt 232  Na 139, K 5.4  Crea 1.8 (at baseline)   AG 19  Troponin 100   AST 98, ALT 66  Lactate 5.5     ECG: sinus latoya with nonspecific ST changes     CT HEAD:  No evidence of acute intracranial pathology.    CT CERVICAL SPINE:  No evidence of acute cervical spine traumatic injury.    CT chest abdomen pelvis:    1.  No evidence for central or segmental pulmonary emboli.  2.  Left lower lobe consolidation, which may represent pneumonia in appropriate clinical setting.  3.  Mucosal hyperemia and mild mural thickening of the ascending, transverse, descending colon, possibly reflecting sequelae of hypoperfusion.  4.  Moderate right and small left pleural effusions.  5.  Mildly displaced left L3 transverse process fracture.  6.  Nondisplaced fractures of right ribs 3 through 6. Nondisplaced fractures of left ribs 4 through 6.  7.  Focus of air within the bladder. Correlate for history of instrumentation.   (06 Jun 2025 02:51)        Vital Signs Last 24 Hrs  T(C): 37.8 (08 Jun 2025 04:00), Max: 38.3 (07 Jun 2025 10:30)  T(F): 100.1 (08 Jun 2025 04:00), Max: 101 (07 Jun 2025 10:30)  HR: 49 (08 Jun 2025 07:10) (46 - 61)  BP: 120/59 (08 Jun 2025 07:00) (105/52 - 133/61)  BP(mean): 85 (08 Jun 2025 07:00) (74 - 88)  RR: 20 (08 Jun 2025 07:00) (20 - 26)  SpO2: 100% (08 Jun 2025 07:10) (100% - 100%)    Parameters below as of 08 Jun 2025 04:00  Patient On (Oxygen Delivery Method): ventilator    O2 Concentration (%): 40    Neurological Exam:   Patient on propofol and fentanyl  No gaze, pupils 2mm and minimal reactivity  Not following commands  No facial asymmetry  Tremulous movements at times  Spontaneous blinking limiting corneal exam.   Gag reflex non discernable  Minimal movement to bilateral UE LE to peripheral noxious stimuli        Medications:  MEDICATIONS  (STANDING):  aspirin  chewable 81 milliGRAM(s) Oral daily  atorvastatin 80 milliGRAM(s) Oral at bedtime  chlorhexidine 0.12% Liquid 15 milliLiter(s) Oral Mucosa every 12 hours  chlorhexidine 2% Cloths 1 Application(s) Topical <User Schedule>  dextrose 5%. 1000 milliLiter(s) (50 mL/Hr) IV Continuous <Continuous>  dextrose 50% Injectable 25 Gram(s) IV Push once  fentaNYL   Infusion. 0.5 MICROgram(s)/kG/Hr (4.38 mL/Hr) IV Continuous <Continuous>  glucagon  Injectable 1 milliGRAM(s) IntraMuscular once  heparin  Infusion.  Unit(s)/Hr (16 mL/Hr) IV Continuous <Continuous>  insulin lispro (ADMELOG) corrective regimen sliding scale   SubCutaneous three times a day before meals  lactated ringers. 500 milliLiter(s) (100 mL/Hr) IV Continuous <Continuous>  levETIRAcetam   Injectable 1500 milliGRAM(s) IV Push every 12 hours  piperacillin/tazobactam IVPB.. 3.375 Gram(s) IV Intermittent every 8 hours  propofol Infusion 0.495 MICROgram(s)/kG/Min (0.26 mL/Hr) IV Continuous <Continuous>  senna 2 Tablet(s) Oral at bedtime  sodium zirconium cyclosilicate 10 Gram(s) Oral every 8 hours      Labs:  CBC Full  -  ( 08 Jun 2025 04:25 )  WBC Count : 9.15 K/uL  RBC Count : 3.04 M/uL  Hemoglobin : 8.7 g/dL  Hematocrit : 27.1 %  Platelet Count - Automated : 125 K/uL  Mean Cell Volume : 89.1 fL  Mean Cell Hemoglobin : 28.6 pg  Mean Cell Hemoglobin Concentration : 32.1 g/dL  Auto Neutrophil # : x  Auto Lymphocyte # : x  Auto Monocyte # : x  Auto Eosinophil # : x  Auto Basophil # : x  Auto Neutrophil % : x  Auto Lymphocyte % : x  Auto Monocyte % : x  Auto Eosinophil % : x  Auto Basophil % : x    06-08    134[L]  |  101  |  46[H]  ----------------------------<  91  4.3   |  18  |  2.9[H]    Ca    7.5[L]      08 Jun 2025 04:25  Mg     2.2     06-06    TPro  4.9[L]  /  Alb  3.0[L]  /  TBili  0.5  /  DBili  x   /  AST  35  /  ALT  27  /  AlkPhos  112  06-08    LIVER FUNCTIONS - ( 08 Jun 2025 04:25 )  Alb: 3.0 g/dL / Pro: 4.9 g/dL / ALK PHOS: 112 U/L / ALT: 27 U/L / AST: 35 U/L / GGT: x           PT/INR - ( 07 Jun 2025 22:51 )   PT: 18.10 sec;   INR: 1.52 ratio         PTT - ( 07 Jun 2025 22:51 )  PTT:>200.0 sec  Urinalysis Basic - ( 08 Jun 2025 04:25 )    Color: x / Appearance: x / SG: x / pH: x  Gluc: 91 mg/dL / Ketone: x  / Bili: x / Urobili: x   Blood: x / Protein: x / Nitrite: x   Leuk Esterase: x / RBC: x / WBC x   Sq Epi: x / Non Sq Epi: x / Bacteria: x      Special findings: Burst-suppression pattern.     Burst-suppression pattern Decreased duration of suppression compared to yesterday.    INTERPRETATION:   This is an abnormal EEG consistent with severe diffuse cerebral dysfunction.

## 2025-06-08 NOTE — DIETITIAN INITIAL EVALUATION ADULT - NS FNS DIET ORDER
Diet, NPO with Tube Feed:   Tube Feeding Modality: Orogastric  Jevity 1.2 [place] (JEVITY1.2RTH)  Total Volume for 24 Hours (mL): 1440  Continuous  Starting Tube Feed Rate {mL per Hour}: 10  Increase Tube Feed Rate by (mL): 10     Every 2 hours  Until Goal Tube Feed Rate (mL per Hour): 60  Tube Feed Duration (in Hours): 24  Tube Feed Start Time: 12:00  Tube Feed Stop Time: 06:00 (06-06-25 @ 13:33) [Active]

## 2025-06-08 NOTE — PROGRESS NOTE ADULT - ASSESSMENT
70 yo M with PMH DM, HTN, HLD, and CAD (CABG 2024) who had a witnessed out of hospital cardiac arrest on 6/5 s/p 2 rounds CPR with approx. 10 minutes of downtime. Patient had recently been discharged 3 days prior from CCU with [facility]. [facility] interrogated and without any significant arrythmias.     IMPRESSION:    # Acute Hypoxemic respiratory failure   # Anoxic brain injury  # SP CPA.  OOH witnessed  Down time around 9 min  # Possible aspiration   # Bilateral effusions   # HO HFPEF with recent admission for HFPEF exacerbation and HTN emergency  # CKD   # DM, HTN, HLD, and CAD SP CABG 2024  # HO Afib     PLAN:     CNS:   - Continue vEEG  - CT with anoxic brain injury   - MRI brain for neuroprognostication   - Send NSE  - Avoid hyperthermia  - F/u Neuro recs  - Keppra BID     HEENT:   - Oral care  - ETT care    PULM:   - Aspiration precautions  - Minimal vent settings  - Monitor airway pressures  - SBT if wakes up     CARDIO:   - F/u echo   - Hep gtt for cardiac therapy     GI:   - GI ppx   - Tube feeding  - Bowel regimen     ID:   - Continue Zosyn  - F/u cultures    HEME:   - Hep gtt    ENDO:   - Insulin protocol    MSK:   - Bedrest     MICU  B/l PIVs  DNR/intubate

## 2025-06-08 NOTE — DIETITIAN INITIAL EVALUATION ADULT - OTHER INFO
Pertinent Medical Information: Per H&P, pt is a 69 year old male with PMH of DM, HTN, HLD, and CAD (CABG 2024) presenting post-cardiac arrest. Per EMS, 911 was called by family after patient had a witnessed cardiac arrest at home; EMS states they arrived to the home shortly thereafter (approximately 5 minutes); initial rhythm PEA, patient intubated in the field, and ROSC after two rounds of CPR with Epi x2 given. Spoke with wife, patient was fine all day, before he had the arrest he was lying down, complained of SOB then passed out. Palliative care team following.     Per MD note on 6/8:  # Acute Hypoxemic respiratory failure   - SBT when awake  - ve 9.09, tmax 37.5 C, on propofol -- 26.3 mL/hr  - Possible CMO on Monday   # Anoxic brain injury  # Possible aspiration     Pertinent Subjective Information: Current TF regimen at goal rate provides -- 1728 kcal, 80g pro, 1162 mL free water.    Weight hx: #/90 KG -- checked 1 month ago. Current dosing weight is 87.6 KG. 2.6% unintentional weight loss in 1 month compared to current dosing weight. pt does not meet weight criteria for malnutrition at this time.

## 2025-06-08 NOTE — DIETITIAN INITIAL EVALUATION ADULT - PERTINENT LABORATORY DATA
06-08    133[L]  |  98  |  43[H]  ----------------------------<  81  3.8   |  21  |  2.9[H]    Ca    7.3[L]      08 Jun 2025 11:40  Phos  6.0     06-08  Mg     2.1     06-08    TPro  4.7[L]  /  Alb  2.7[L]  /  TBili  0.6  /  DBili  x   /  AST  31  /  ALT  27  /  AlkPhos  116[H]  06-08  POCT Blood Glucose.: 93 mg/dL (06-08-25 @ 11:39)  A1C with Estimated Average Glucose Result: 6.5 % (06-02-25 @ 05:40)  A1C with Estimated Average Glucose Result: 6.8 % (06-01-25 @ 04:15)

## 2025-06-08 NOTE — PROGRESS NOTE ADULT - TIME BILLING
above.  Total time spent includes but is not limited to personal review of patient chart, available results, collateral information (if necessary) and examination of patient at bedside and time spent formulating assessment and recommendations independent of teaching time.
above.  Total time spent includes but is not limited to personal review of patient chart, available results, collateral information (if necessary) and examination of patient at bedside and time spent formulating assessment and recommendations independent of teaching time.

## 2025-06-08 NOTE — DIETITIAN INITIAL EVALUATION ADULT - ADD RECOMMEND
1. Recommend to adjust TF regiment to meet estimated needs and pt is on a high rate of propofol: Vital HP (lower in fat) via OGT, continuous 18hr feeds, total volume: 1260 mL, start rate at 25 mL/hr and increase q4hrs as tolerated until goal rate of 70 mL/hr is reached. TF regimen at goal rate + propofol (694.32 kcal) to provide -- 1954 kcal, 110g pro, 1053 mL free water + additional water flushes of 50 mL q4hrs -- team to adjust water flushes prn. TF regimen at goal rate meets >85 - 105% of estimated needs.     High risk f/u

## 2025-06-08 NOTE — DIETITIAN INITIAL EVALUATION ADULT - OTHER CALCULATIONS
HR=94 bpm, QLBI=567/85 mmhg, SpO2=90.0 %, Comment=SR Using ABW: ENERGY: PSE 1908.66 kcal/day (ve 9.09, tmax 37.5 C); PROTEIN: 105-123 g/day (1.2-1.4 g/kg); FLUID: 1mL/kcal -- with consideration for age, BMI, intubated on propofol -- additional 694.32 kcal

## 2025-06-08 NOTE — DIETITIAN INITIAL EVALUATION ADULT - NSFNSGIIOFT_GEN_A_CORE
No BMs documented; GI: WDL per flow sheet     06-07-25 @ 07:01  -  06-08-25 @ 07:00  --------------------------------------------------------  OUT:  Total OUT: 0 mL    Total NET: 300 mL      06-08-25 @ 07:01  -  06-08-25 @ 12:23  --------------------------------------------------------  OUT:  Total OUT: 0 mL    Total NET: 60 mL

## 2025-06-08 NOTE — PROGRESS NOTE ADULT - SUBJECTIVE AND OBJECTIVE BOX
MICU RESIDENT PROGRESS NOTE    Patient is a 69y old  Male who presents with a chief complaint of cardiac arrest (08 Jun 2025 12:23)        INTERVAL HPI/OVERNIGHT EVENTS:   No acute events overnight.       ICU Vital Signs Last 24 Hrs  T(C): 37.5 (08 Jun 2025 12:00), Max: 38 (07 Jun 2025 20:00)  T(F): 99.5 (08 Jun 2025 12:00), Max: 100.4 (07 Jun 2025 20:00)  HR: 50 (08 Jun 2025 13:20) (46 - 53)  BP: 129/54 (08 Jun 2025 13:00) (110/54 - 133/58)  BP(mean): 78 (08 Jun 2025 13:00) (74 - 88)  ABP: --  ABP(mean): --  RR: 23 (08 Jun 2025 13:00) (20 - 33)  SpO2: 100% (08 Jun 2025 13:20) (100% - 100%)    O2 Parameters below as of 08 Jun 2025 13:00  Patient On (Oxygen Delivery Method): ventilator    O2 Concentration (%): 40      I&O's Summary    07 Jun 2025 07:01  -  08 Jun 2025 07:00  --------------------------------------------------------  IN: 3183.6 mL / OUT: 650 mL / NET: 2533.6 mL    08 Jun 2025 07:01  -  08 Jun 2025 14:50  --------------------------------------------------------  IN: 651.6 mL / OUT: 360 mL / NET: 291.6 mL      Mode: AC/ CMV (Assist Control/ Continuous Mandatory Ventilation)  RR (machine): 20  TV (machine): 440  FiO2: 40  PEEP: 8  ITime: 1  MAP: 18  PIP: 27      LABS:                        7.9    10.10 )-----------( 141      ( 08 Jun 2025 11:40 )             24.5     06-08    133[L]  |  98  |  43[H]  ----------------------------<  81  3.8   |  21  |  2.9[H]    Ca    7.3[L]      08 Jun 2025 11:40  Phos  6.0     06-08  Mg     2.1     06-08    TPro  4.7[L]  /  Alb  2.7[L]  /  TBili  0.6  /  DBili  x   /  AST  31  /  ALT  27  /  AlkPhos  116[H]  06-08    PT/INR - ( 07 Jun 2025 22:51 )   PT: 18.10 sec;   INR: 1.52 ratio         PTT - ( 08 Jun 2025 08:10 )  PTT:106.2 sec  Urinalysis Basic - ( 08 Jun 2025 11:40 )    Color: x / Appearance: x / SG: x / pH: x  Gluc: 81 mg/dL / Ketone: x  / Bili: x / Urobili: x   Blood: x / Protein: x / Nitrite: x   Leuk Esterase: x / RBC: x / WBC x   Sq Epi: x / Non Sq Epi: x / Bacteria: x      CAPILLARY BLOOD GLUCOSE      POCT Blood Glucose.: 93 mg/dL (08 Jun 2025 11:39)  POCT Blood Glucose.: 92 mg/dL (08 Jun 2025 04:58)  POCT Blood Glucose.: 78 mg/dL (07 Jun 2025 21:29)  POCT Blood Glucose.: 170 mg/dL (07 Jun 2025 17:20)  POCT Blood Glucose.: 58 mg/dL (07 Jun 2025 17:12)    ABG - ( 08 Jun 2025 03:01 )  pH, Arterial: 7.37  pH, Blood: x     /  pCO2: 35    /  pO2: 100   / HCO3: 20    / Base Excess: -4.4  /  SaO2: 98.5                RADIOLOGY & ADDITIONAL TESTS:    Consultant(s) Notes Reviewed:  [x ] YES  [ ] NO    MEDICATIONS  (STANDING):  aspirin  chewable 81 milliGRAM(s) Oral daily  atorvastatin 80 milliGRAM(s) Oral at bedtime  chlorhexidine 0.12% Liquid 15 milliLiter(s) Oral Mucosa every 12 hours  chlorhexidine 2% Cloths 1 Application(s) Topical <User Schedule>  dextrose 5%. 1000 milliLiter(s) (50 mL/Hr) IV Continuous <Continuous>  dextrose 50% Injectable 25 Gram(s) IV Push once  fentaNYL   Infusion. 0.5 MICROgram(s)/kG/Hr (4.38 mL/Hr) IV Continuous <Continuous>  glucagon  Injectable 1 milliGRAM(s) IntraMuscular once  heparin  Infusion.  Unit(s)/Hr (16 mL/Hr) IV Continuous <Continuous>  insulin lispro (ADMELOG) corrective regimen sliding scale   SubCutaneous three times a day before meals  lactated ringers. 500 milliLiter(s) (100 mL/Hr) IV Continuous <Continuous>  levETIRAcetam   Injectable 1500 milliGRAM(s) IV Push every 12 hours  piperacillin/tazobactam IVPB.. 3.375 Gram(s) IV Intermittent every 8 hours  propofol Infusion 0.495 MICROgram(s)/kG/Min (0.26 mL/Hr) IV Continuous <Continuous>  senna 2 Tablet(s) Oral at bedtime  sodium zirconium cyclosilicate 10 Gram(s) Oral every 8 hours    MEDICATIONS  (PRN):  dextrose Oral Gel 15 Gram(s) Oral once PRN Blood Glucose LESS THAN 70 milliGRAM(s)/deciliter  heparin   Injectable 7000 Unit(s) IV Push every 6 hours PRN For aPTT less than 40  heparin   Injectable 3500 Unit(s) IV Push every 6 hours PRN For aPTT between 40 - 57  polyethylene glycol 3350 17 Gram(s) Oral two times a day PRN Constipation      PHYSICAL EXAM:  GENERAL:   HEAD:  Atraumatic, Normocephalic  EYES: EOMI, PERRLA, conjunctiva and sclera clear  NECK: Supple, No JVD, Normal thyroid, no enlarged nodes  NERVOUS SYSTEM:  GCS3T, myoclonic jerks   CHEST/LUNG: B/L good air entry; No rales, rhonchi, or wheezing  HEART: S1S2 normal, no S3, Regular rate and rhythm; No murmurs  ABDOMEN: Soft, Nontender, Nondistended; Bowel sounds present  EXTREMITIES:  2+ Peripheral Pulses, No clubbing, cyanosis, or edema  LYMPH: No lymphadenopathy noted  SKIN: No rashes or lesions

## 2025-06-08 NOTE — PROGRESS NOTE ADULT - SUBJECTIVE AND OBJECTIVE BOX
Patient is a 69y old  Male who presents with a chief complaint of cardiac arrest (08 Jun 2025 07:30)        Over Night Events:    On MV FiO2 40%. Off pressors    ROS:     All pertinent ROS are negative except HPI         PHYSICAL EXAM    ICU Vital Signs Last 24 Hrs  T(C): 37.8 (08 Jun 2025 08:00), Max: 38.3 (07 Jun 2025 10:30)  T(F): 100 (08 Jun 2025 08:00), Max: 101 (07 Jun 2025 10:30)  HR: 50 (08 Jun 2025 09:00) (46 - 61)  BP: 127/60 (08 Jun 2025 09:00) (105/52 - 129/60)  BP(mean): 86 (08 Jun 2025 09:00) (74 - 88)  ABP: --  ABP(mean): --  RR: 21 (08 Jun 2025 09:00) (20 - 33)  SpO2: 100% (08 Jun 2025 09:00) (100% - 100%)    O2 Parameters below as of 08 Jun 2025 08:00  Patient On (Oxygen Delivery Method): ventilator    O2 Concentration (%): 40        CONSTITUTIONAL:  NAD    ENT:   ET tube    EYES:   Pupils equal,   Round and reactive to light.    CARDIAC:   Normal rate,   Regular rhythm.      RESPIRATORY:   No wheezing  Bilateral BS  Normal chest expansion  Not tachypneic,  No use of accessory muscles    GASTROINTESTINAL:  Abdomen soft,   Non-tender,   No guarding,   + BS        MUSCULOSKELETAL:   No clubbing, cyanosis    NEUROLOGICAL:   Sedated    SKIN:   Skin normal color for race,         06-07-25 @ 07:01  -  06-08-25 @ 07:00  --------------------------------------------------------  IN:    Enteral Tube Flush: 400 mL    FentaNYL: 210 mL    FentaNYL: 139.9 mL    FentaNYL: 52.5 mL    Heparin Infusion: 308 mL    IV PiggyBack: 300 mL    Jevity 1.2: 300 mL    Lactated Ringers: 600 mL    Lactated Ringers Bolus: 300 mL    Propofol: 510.2 mL    Propofol: 63 mL  Total IN: 3183.6 mL    OUT:    Indwelling Catheter - Urethral (mL): 650 mL  Total OUT: 650 mL    Total NET: 2533.6 mL      06-08-25 @ 07:01  -  06-08-25 @ 09:56  --------------------------------------------------------  IN:    FentaNYL: 52.5 mL    Heparin Infusion: 29 mL    Propofol: 78.9 mL  Total IN: 160.4 mL    OUT:    Indwelling Catheter - Urethral (mL): 185 mL    Jevity 1.2: 0 mL    Lactated Ringers: 0 mL  Total OUT: 185 mL    Total NET: -24.6 mL          LABS:                            8.7    9.15  )-----------( 125      ( 08 Jun 2025 04:25 )             27.1                                               06-08    134[L]  |  101  |  46[H]  ----------------------------<  91  4.3   |  18  |  2.9[H]    Ca    7.5[L]      08 Jun 2025 04:25  Mg     2.2     06-06    TPro  4.9[L]  /  Alb  3.0[L]  /  TBili  0.5  /  DBili  x   /  AST  35  /  ALT  27  /  AlkPhos  112  06-08      PT/INR - ( 07 Jun 2025 22:51 )   PT: 18.10 sec;   INR: 1.52 ratio         PTT - ( 08 Jun 2025 08:10 )  PTT:106.2 sec                                       Urinalysis Basic - ( 08 Jun 2025 04:25 )    Color: x / Appearance: x / SG: x / pH: x  Gluc: 91 mg/dL / Ketone: x  / Bili: x / Urobili: x   Blood: x / Protein: x / Nitrite: x   Leuk Esterase: x / RBC: x / WBC x   Sq Epi: x / Non Sq Epi: x / Bacteria: x                                                  LIVER FUNCTIONS - ( 08 Jun 2025 04:25 )  Alb: 3.0 g/dL / Pro: 4.9 g/dL / ALK PHOS: 112 U/L / ALT: 27 U/L / AST: 35 U/L / GGT: x                                                  Culture - Blood (collected 06 Jun 2025 06:10)  Source: Blood Blood  Preliminary Report (07 Jun 2025 14:02):    No growth at 24 hours    Culture - Blood (collected 06 Jun 2025 02:50)  Source: Blood Blood  Preliminary Report (08 Jun 2025 07:01):    No growth at 48 Hours                                                   Mode: AC/ CMV (Assist Control/ Continuous Mandatory Ventilation)  RR (machine): 20  TV (machine): 440  FiO2: 40  PEEP: 8  ITime: 0.9  MAP: 11  PIP: 26                                      ABG - ( 08 Jun 2025 03:01 )  pH, Arterial: 7.37  pH, Blood: x     /  pCO2: 35    /  pO2: 100   / HCO3: 20    / Base Excess: -4.4  /  SaO2: 98.5                MEDICATIONS  (STANDING):  aspirin  chewable 81 milliGRAM(s) Oral daily  atorvastatin 80 milliGRAM(s) Oral at bedtime  chlorhexidine 0.12% Liquid 15 milliLiter(s) Oral Mucosa every 12 hours  chlorhexidine 2% Cloths 1 Application(s) Topical <User Schedule>  dextrose 5%. 1000 milliLiter(s) (50 mL/Hr) IV Continuous <Continuous>  dextrose 50% Injectable 25 Gram(s) IV Push once  fentaNYL   Infusion. 0.5 MICROgram(s)/kG/Hr (4.38 mL/Hr) IV Continuous <Continuous>  glucagon  Injectable 1 milliGRAM(s) IntraMuscular once  heparin  Infusion.  Unit(s)/Hr (16 mL/Hr) IV Continuous <Continuous>  insulin lispro (ADMELOG) corrective regimen sliding scale   SubCutaneous three times a day before meals  lactated ringers. 500 milliLiter(s) (100 mL/Hr) IV Continuous <Continuous>  levETIRAcetam   Injectable 1500 milliGRAM(s) IV Push every 12 hours  piperacillin/tazobactam IVPB.. 3.375 Gram(s) IV Intermittent every 8 hours  propofol Infusion 0.495 MICROgram(s)/kG/Min (0.26 mL/Hr) IV Continuous <Continuous>  senna 2 Tablet(s) Oral at bedtime  sodium zirconium cyclosilicate 10 Gram(s) Oral every 8 hours    MEDICATIONS  (PRN):  dextrose Oral Gel 15 Gram(s) Oral once PRN Blood Glucose LESS THAN 70 milliGRAM(s)/deciliter  heparin   Injectable 7000 Unit(s) IV Push every 6 hours PRN For aPTT less than 40  heparin   Injectable 3500 Unit(s) IV Push every 6 hours PRN For aPTT between 40 - 57  polyethylene glycol 3350 17 Gram(s) Oral two times a day PRN Constipation      New X-rays reviewed:                                                                                  ECHO    CXR interpreted by me:

## 2025-06-08 NOTE — DIETITIAN INITIAL EVALUATION ADULT - ORAL INTAKE PTA/DIET HISTORY
Pt unable to participate in nutrition assessment interview at time of visit; intubated. No family observed at bedside.     Obtained nutrition hx from previous RD assessment obtain on 6/3: Pt reports having a good appetite; consumes 2 meals daily. Denies taking vitamin or mineral supplements. Denies drinking protein shake supplements. NKFA; denies any restrictive cultural or Religion food preferences. No chewing or swallowing difficulties noted with regular textured food. #/90 KG -- checked 1 month ago.

## 2025-06-08 NOTE — EEG REPORT - NO NORMAL FEATURES OF WAKEFULNESS, DROWSINESS OR SLEEP ARE PRESENT
Problem: Goal Outcome Summary  Goal: Goal Outcome Summary  Surgeon Discharge Plan: Home today with son.    Current Functional Status: Pt ambulates 200+' with 2WW and SBA x1. Pt transfers in/out BR with SBAx1 and 2WW. Did not need grab bars.     Barriers to Plan/Home: None known.               
Statement Selected
Statement Selected

## 2025-06-08 NOTE — DIETITIAN INITIAL EVALUATION ADULT - PERTINENT MEDS FT
Where are we with her capsule study?  MEDICATIONS  (STANDING):  aspirin  chewable 81 milliGRAM(s) Oral daily  atorvastatin 80 milliGRAM(s) Oral at bedtime  chlorhexidine 0.12% Liquid 15 milliLiter(s) Oral Mucosa every 12 hours  chlorhexidine 2% Cloths 1 Application(s) Topical <User Schedule>  dextrose 5%. 1000 milliLiter(s) (50 mL/Hr) IV Continuous <Continuous>  dextrose 50% Injectable 25 Gram(s) IV Push once  fentaNYL   Infusion. 0.5 MICROgram(s)/kG/Hr (4.38 mL/Hr) IV Continuous <Continuous>  glucagon  Injectable 1 milliGRAM(s) IntraMuscular once  heparin  Infusion.  Unit(s)/Hr (16 mL/Hr) IV Continuous <Continuous>  insulin lispro (ADMELOG) corrective regimen sliding scale   SubCutaneous three times a day before meals  lactated ringers. 500 milliLiter(s) (100 mL/Hr) IV Continuous <Continuous>  levETIRAcetam   Injectable 1500 milliGRAM(s) IV Push every 12 hours  piperacillin/tazobactam IVPB.. 3.375 Gram(s) IV Intermittent every 8 hours  propofol Infusion 0.495 MICROgram(s)/kG/Min (0.26 mL/Hr) IV Continuous <Continuous>  senna 2 Tablet(s) Oral at bedtime  sodium zirconium cyclosilicate 10 Gram(s) Oral every 8 hours    MEDICATIONS  (PRN):  dextrose Oral Gel 15 Gram(s) Oral once PRN Blood Glucose LESS THAN 70 milliGRAM(s)/deciliter  heparin   Injectable 7000 Unit(s) IV Push every 6 hours PRN For aPTT less than 40  heparin   Injectable 3500 Unit(s) IV Push every 6 hours PRN For aPTT between 40 - 57  polyethylene glycol 3350 17 Gram(s) Oral two times a day PRN Constipation

## 2025-06-08 NOTE — PROGRESS NOTE ADULT - ATTENDING COMMENTS
Attending Statement: I have personally performed a face to face diagnostic evaluation on this patient. The patient is suffering from:    I have made amendments to the documentation where necessary. I have personally seen and examined this patient.  I have fully participated in the care of this patient.  I have reviewed all pertinent clinical information, including history, physical exam, plan and note.

## 2025-06-08 NOTE — PROGRESS NOTE ADULT - ASSESSMENT
IMPRESSION:    Acute Hypoxemic respiratory failure   Anoxic brain injury  SP CPA.  OOH witnessed  Down time around 9 min  Possible aspiration   Bilateral effusions   HO HFPEF with recent admission for HFPEF exacerbation and HTN emergency  CKD   DM, HTN, HLD, and CAD SP CABG 2024  HO Afib     PLAN:    CNS: EEG noted.  VEEG.   CT shows anoxic brain injury. SAT. MRI for prognostication NSE.  Avoid hyperthermia. Neuro follow up. c/w keppra    HEENT: Oral care.  ET care     PULMONARY:  HOB @ 45 degrees.  Aspiration precautions;  Wean O2.  .  A line.  Monitor airway pressures.  sbt when awake     CARDIOVASCULAR: Trend CE until peak.  ECHO.  Maximize cardiac therapy and volume status     GI: GI prophylaxis.  Feeding.  Bowel regimen MD    RENAL:  Follow up lytes.  Correct as needed.  Monitor UO.     INFECTIOUS DISEASE: Follow up cultures.  ZOsyn.  Nasal MRSA negative.  send DTA, procal, rvp.    HEMATOLOGICAL:  DVT prophylaxis. Monitor CBC and coags.  Continue IV heparin.        ENDOCRINE:  Follow up FS.  Insulin protocol if needed.      MUSCULOSKELETAL:  Bed rest.  Off loading     MICU     Palliative f/u  Possible CMO on monday  Poor prognosis

## 2025-06-08 NOTE — DIETITIAN INITIAL EVALUATION ADULT - NAME AND PHONE
Valerie [phone] or TEAMS    Nutrition Interventions: TF regimen; Nutrition Monitoring: Diet order, weights, labs, NFPF, body composition, BM, GOC, and tolerance to TF regimen

## 2025-06-09 NOTE — PROGRESS NOTE ADULT - CONVERSATION DETAILS
Spoke with patient's partner, Lori in patient's room. Palliative care reintroduced. Dr. García provided a medical update and hospital course.  We discussed the patient's prognosis and noted that he was not clinically improving, and that ongoing aggressive medical management would require trach/PEG and ongoing poor quality of life.  We discussed the option of comfort measures only and palliative extubation. Patient's partner would like to pursue comfort measures only and palliative extubation tomorrow at 11am.  No escalation of care until that time. All questions answered.

## 2025-06-09 NOTE — PROGRESS NOTE ADULT - SUBJECTIVE AND OBJECTIVE BOX
Over Night Events: events noted, remains critically ill, still intubated, ventilated, on fentanyl, propofol, off hep., dec hb    PHYSICAL EXAM    ICU Vital Signs Last 24 Hrs  T(C): 36.5 (09 Jun 2025 08:00), Max: 37.5 (08 Jun 2025 12:00)  T(F): 97.7 (09 Jun 2025 08:00), Max: 99.5 (08 Jun 2025 12:00)  HR: 51 (09 Jun 2025 08:00) (47 - 61)  BP: 123/79 (09 Jun 2025 08:00) (112/56 - 178/118)  BP(mean): 75 (09 Jun 2025 08:00) (75 - 131)  RR: 34 (09 Jun 2025 08:00) (18 - 42)  SpO2: 100% (09 Jun 2025 08:00) (97% - 100%)    O2 Parameters below as of 09 Jun 2025 08:00  Patient On (Oxygen Delivery Method): ventilator    O2 Concentration (%): 40        General: ill lookig  ETT  Lungs: Bilateral Rhonchi  Cardiovascular: Regular   Abdomen: Soft, Positive BS  Extremities: No clubbing   not following commands  brainstem activity present    06-08-25 @ 07:01  -  06-09-25 @ 07:00  --------------------------------------------------------  IN:    Enteral Tube Flush: 100 mL    FentaNYL: 175 mL    FentaNYL: 245 mL    Heparin Infusion: 200 mL    IV PiggyBack: 350 mL    Jevity 1.2: 1140 mL    Propofol: 604.9 mL  Total IN: 2814.9 mL    OUT:    Indwelling Catheter - Urethral (mL): 1555 mL    Lactated Ringers: 0 mL  Total OUT: 1555 mL    Total NET: 1259.9 mL      06-09-25 @ 07:01  -  06-09-25 @ 08:53  --------------------------------------------------------  IN:    FentaNYL: 17.5 mL    Propofol: 26.3 mL  Total IN: 43.8 mL    OUT:    Indwelling Catheter - Urethral (mL): 35 mL  Total OUT: 35 mL    Total NET: 8.8 mL          LABS:                          6.7    8.24  )-----------( 172      ( 09 Jun 2025 06:34 )             20.6                                               06-09    138  |  100  |  46[H]  ----------------------------<  191[H]  3.6   |  23  |  2.7[H]    Ca    7.3[L]      09 Jun 2025 05:20  Phos  6.4     06-09  Mg     2.2     06-09    TPro  4.6[L]  /  Alb  2.5[L]  /  TBili  0.4  /  DBili  x   /  AST  26  /  ALT  20  /  AlkPhos  128[H]  06-09      PT/INR - ( 07 Jun 2025 22:51 )   PT: 18.10 sec;   INR: 1.52 ratio         PTT - ( 09 Jun 2025 05:20 )  PTT:68.0 sec                                       Urinalysis Basic - ( 09 Jun 2025 05:20 )    Color: x / Appearance: x / SG: x / pH: x  Gluc: 191 mg/dL / Ketone: x  / Bili: x / Urobili: x   Blood: x / Protein: x / Nitrite: x   Leuk Esterase: x / RBC: x / WBC x   Sq Epi: x / Non Sq Epi: x / Bacteria: x                                                  LIVER FUNCTIONS - ( 09 Jun 2025 05:20 )  Alb: 2.5 g/dL / Pro: 4.6 g/dL / ALK PHOS: 128 U/L / ALT: 20 U/L / AST: 26 U/L / GGT: x                                                                                               Mode: AC/ CMV (Assist Control/ Continuous Mandatory Ventilation)  RR (machine): 20  TV (machine): 440  FiO2: 40  PEEP: 8  ITime: 0.9  MAP: 10  PIP: 26                                      ABG - ( 08 Jun 2025 03:01 )  pH, Arterial: 7.37  pH, Blood: x     /  pCO2: 35    /  pO2: 100   / HCO3: 20    / Base Excess: -4.4  /  SaO2: 98.5                MEDICATIONS  (STANDING):  aspirin  chewable 81 milliGRAM(s) Oral daily  atorvastatin 80 milliGRAM(s) Oral at bedtime  chlorhexidine 0.12% Liquid 15 milliLiter(s) Oral Mucosa every 12 hours  chlorhexidine 2% Cloths 1 Application(s) Topical <User Schedule>  dextrose 5%. 1000 milliLiter(s) (50 mL/Hr) IV Continuous <Continuous>  dextrose 50% Injectable 25 Gram(s) IV Push once  fentaNYL   Infusion 0.5 MICROgram(s)/kG/Hr (4.38 mL/Hr) IV Continuous <Continuous>  glucagon  Injectable 1 milliGRAM(s) IntraMuscular once  insulin lispro (ADMELOG) corrective regimen sliding scale   SubCutaneous three times a day before meals  lactated ringers. 500 milliLiter(s) (100 mL/Hr) IV Continuous <Continuous>  levETIRAcetam   Injectable 1500 milliGRAM(s) IV Push every 12 hours  piperacillin/tazobactam IVPB.. 3.375 Gram(s) IV Intermittent every 8 hours  propofol Infusion 0.495 MICROgram(s)/kG/Min (0.26 mL/Hr) IV Continuous <Continuous>  senna 2 Tablet(s) Oral at bedtime    MEDICATIONS  (PRN):  dextrose Oral Gel 15 Gram(s) Oral once PRN Blood Glucose LESS THAN 70 milliGRAM(s)/deciliter  polyethylene glycol 3350 17 Gram(s) Oral two times a day PRN Constipation

## 2025-06-09 NOTE — EEG REPORT - NS EEG TEXT BOX
Epilepsy Attending Note:     FREDRICK HOFFMAN    69y Male  MRN [id number]    Vital Signs Last 24 Hrs  T(C): 36.5 (09 Jun 2025 08:00), Max: 37.5 (08 Jun 2025 12:00)  T(F): 97.7 (09 Jun 2025 08:00), Max: 99.5 (08 Jun 2025 12:00)  HR: 51 (09 Jun 2025 08:00) (47 - 61)  BP: 123/79 (09 Jun 2025 08:00) (112/56 - 178/118)  BP(mean): 75 (09 Jun 2025 08:00) (75 - 131)  RR: 34 (09 Jun 2025 08:00) (18 - 42)  SpO2: 100% (09 Jun 2025 08:00) (97% - 100%)    Parameters below as of 09 Jun 2025 08:00  Patient On (Oxygen Delivery Method): ventilator    O2 Concentration (%): 40                          6.7    8.24  )-----------( 172      ( 09 Jun 2025 06:34 )             20.6       06-09    138  |  100  |  46[H]  ----------------------------<  191[H]  3.6   |  23  |  2.7[H]    Ca    7.3[L]      09 Jun 2025 05:20  Phos  6.4     06-09  Mg     2.2     06-09    TPro  4.6[L]  /  Alb  2.5[L]  /  TBili  0.4  /  DBili  x   /  AST  26  /  ALT  20  /  AlkPhos  128[H]  06-09      MEDICATIONS  (STANDING):  aspirin  chewable 81 milliGRAM(s) Oral daily  atorvastatin 80 milliGRAM(s) Oral at bedtime  chlorhexidine 0.12% Liquid 15 milliLiter(s) Oral Mucosa every 12 hours  chlorhexidine 2% Cloths 1 Application(s) Topical <User Schedule>  dextrose 5%. 1000 milliLiter(s) (50 mL/Hr) IV Continuous <Continuous>  dextrose 50% Injectable 25 Gram(s) IV Push once  fentaNYL   Infusion 0.5 MICROgram(s)/kG/Hr (4.38 mL/Hr) IV Continuous <Continuous>  glucagon  Injectable 1 milliGRAM(s) IntraMuscular once  insulin lispro (ADMELOG) corrective regimen sliding scale   SubCutaneous three times a day before meals  lactated ringers. 500 milliLiter(s) (100 mL/Hr) IV Continuous <Continuous>  levETIRAcetam   Injectable 1500 milliGRAM(s) IV Push every 12 hours  piperacillin/tazobactam IVPB.. 3.375 Gram(s) IV Intermittent every 8 hours  propofol Infusion 0.495 MICROgram(s)/kG/Min (0.26 mL/Hr) IV Continuous <Continuous>  senna 2 Tablet(s) Oral at bedtime    MEDICATIONS  (PRN):  dextrose Oral Gel 15 Gram(s) Oral once PRN Blood Glucose LESS THAN 70 milliGRAM(s)/deciliter  polyethylene glycol 3350 17 Gram(s) Oral two times a day PRN Constipation            VEEG in the last 24 hours:    Background------------------ symmetrical , poorly organized and poorly reactive and modulating consisting of two patterns                                           1- continues diffusely expressed 3-4 hz spikes and polyspikes  2- burst suppression pattern each lasting for 2-3 seconds. Bursts consisting of 10-15 hz rhythmic discharges that at times presented with epileptiform features    Focal and generalized slowing--------severe generalized slowing    Interictal activity-----as above    Events--- Electrographical pattern as above suggestive of subclinical/clinical ongoing epileptic pattern    Seizures---as above    Impression: abnormal as above    Plan - as/neurology    
Brief Clinical History:  FREDRICK HOFFMAN is a 69 year old Male; study performed to investigate for seizures or markers of epilepsy.   Diagnosis Code: R56.9 convulsions/seizure  CPT:  55878 (coma)     Patient Medication:  PERIDEX    INSULIN    PROTONIX    ZOSYN      Acquisition Details:  Electroencephalography was acquired using a minimum of 21 channels on an [email or web address] Neu[employer] system v 9.3.1 with electrode placement according to the standard International 10-20 system following ACNS (American Clinical Neurophysiology Society) guidelines.  Anterior temporal T1 and T2 electrodes were utilized whenever possible.   The [employer] automated spike & seizure detections were all reviewed in detail, in addition to the entire raw EEG.    Findings:  Background:  continuous. Low amplitude cortical activity seen at 2 microvolts/mm.  Voltage:  Suppressed (all <10 uV)  Organization:  Rudimentary  Posterior Dominant Rhythm:  <7 Hz ,symmetrical  Variability:  Unclear	Reactivity:  No  Sleep:  Absent.  Focal abnormalities:  No persistent asymmetries of voltage or frequency.  Interictal Activity:  occasional generalized spikes or polyspikes  Location:  as above  Focal Slowing:  None  Generalized Slowing:  Severe  Events:  1)	No electrographic seizures or significant clinical events.  Provocations:  1)	Hyperventilation: was not performed.  2)	Photic stimulation: was not performed.    Impression:  Abnormal due to  1)	Severe generalized slowing  2)	generalized spikes that reportedly is associated with myoclonus    Clinical Correlation:  Findings consistent with diffuse electrocerebral dysfunction secondary to structural/metabolic/nonspecific etiology  And generalized cortical  myoclonic events    Oc Reynoso MD  Attending Neurologist, Division of Epilepsy  
6/7/25 07:00- 6/8/25 07:00
6/6/25 12:53- 6/7/25 07:00

## 2025-06-09 NOTE — PROGRESS NOTE ADULT - ASSESSMENT
-- DO NOT REPLY / DO NOT REPLY ALL --  -- Message is from the [facility]--    COVID-19 Universal Screening: Negative    General Patient Message      Reason for Call: Patient's father was advised to schedule a 3 month follow up visit through video, per KB send message to provider. Please call to schedule     Caller Information       Type Contact Phone    06/28/2021 03:35 PM CDT Phone (Incoming) MEGAN SANTANA (Father) [phone]          Alternative phone number: n/a    Turnaround time given to caller:   \"This message will be sent to [state Provider's name]. The clinical team will fulfill your request as soon as they review your message.\"     IMPRESSION:    Acute Hypoxemic respiratory failure   Anoxic brain injury  acute blood loss/ anemia  SP CPA.  OOH witnessed  Down time around 9 min  Possible aspiration   Bilateral effusions   HO HFPEF with recent admission for HFPEF exacerbation and HTN emergency  CKD   DM, HTN, HLD, and CAD SP CABG 2024  HO Afib     PLAN:    CNS: VEEG.   CT shows anoxic brain injury. SAT. MRI for prognostication NSE.  Avoid hyperthermia. Neuro follow up. c/w keppra    HEENT: Oral care.  ET care     PULMONARY:  HOB @ 45 degrees.  Aspiration precautions;  Wean O2.  .  A line.  Monitor airway pressures.     CARDIOVASCULAR: Trend CE until peak.  ECHO.  Maximize cardiac therapy and volume status     GI: GI prophylaxis.  Feeding.  Bowel regimen    RENAL:  Follow up lytes.  Correct as needed.  Monitor UO.     INFECTIOUS DISEASE: Follow up cultures.  ZOsyn.      HEMATOLOGICAL:  DVT prophylaxis. Monitor CBC and coags.  Continue IV heparin.        ENDOCRINE:  Follow up FS.  Insulin protocol if needed.      MUSCULOSKELETAL:  Bed rest.  Off loading     MICU     Palliative f/u  Possible CMO   Poor prognosis    no TLC

## 2025-06-09 NOTE — PROGRESS NOTE ADULT - SUBJECTIVE AND OBJECTIVE BOX
Neurology Progress Note    Interval History:    Patient seen and examined today. No events overnight. Still on VEEG. Yesterday's EEG showed reduced burst suppression and currently on keppra 1500mg BID with sedation. This morning pt seen to be having frequent myoclonic jerks with EEG correlation.         Medications:  aspirin  chewable 81 milliGRAM(s) Oral daily  atorvastatin 80 milliGRAM(s) Oral at bedtime  chlorhexidine 0.12% Liquid 15 milliLiter(s) Oral Mucosa every 12 hours  chlorhexidine 2% Cloths 1 Application(s) Topical <User Schedule>  dextrose 5%. 1000 milliLiter(s) IV Continuous <Continuous>  dextrose 50% Injectable 25 Gram(s) IV Push once  dextrose Oral Gel 15 Gram(s) Oral once PRN  fentaNYL   Infusion 0.5 MICROgram(s)/kG/Hr IV Continuous <Continuous>  glucagon  Injectable 1 milliGRAM(s) IntraMuscular once  insulin lispro (ADMELOG) corrective regimen sliding scale   SubCutaneous three times a day before meals  lactated ringers. 500 milliLiter(s) IV Continuous <Continuous>  levETIRAcetam   Injectable 1500 milliGRAM(s) IV Push every 12 hours  piperacillin/tazobactam IVPB.. 3.375 Gram(s) IV Intermittent every 8 hours  polyethylene glycol 3350 17 Gram(s) Oral two times a day PRN  propofol Infusion 0.495 MICROgram(s)/kG/Min IV Continuous <Continuous>  senna 2 Tablet(s) Oral at bedtime      Vital Signs Last 24 Hrs  T(C): 36.5 (09 Jun 2025 08:00), Max: 37.5 (08 Jun 2025 12:00)  T(F): 97.7 (09 Jun 2025 08:00), Max: 99.5 (08 Jun 2025 12:00)  HR: 51 (09 Jun 2025 08:00) (47 - 61)  BP: 123/79 (09 Jun 2025 08:00) (112/56 - 178/118)  BP(mean): 75 (09 Jun 2025 08:00) (75 - 131)  RR: 34 (09 Jun 2025 08:00) (18 - 42)  SpO2: 100% (09 Jun 2025 08:00) (97% - 100%)    Parameters below as of 09 Jun 2025 08:00  Patient On (Oxygen Delivery Method): ventilator    O2 Concentration (%): 40    Neurological Exam:   General: frequent myoclonic jerks in b/l UE and LE  Mental status: Comatose, on propofol and fentanyl  ENT: gag and cough reflex +  Cranial nerves: Pupils equally round  about 2mm and reactive to light; corneal reflex present b/l; no oculocephalic reflex  Motor:  myoclonic jerks in UE and LE; no response to noxious stimuli   Sensation: no response to noxious stimuli   Gait: Narrow and steady. No ataxia.  Romberg negative    Labs:  CBC Full  -  ( 09 Jun 2025 06:34 )  WBC Count : 8.24 K/uL  RBC Count : 2.39 M/uL  Hemoglobin : 6.7 g/dL  Hematocrit : 20.6 %  Platelet Count - Automated : 172 K/uL  Mean Cell Volume : 86.2 fL  Mean Cell Hemoglobin : 28.0 pg  Mean Cell Hemoglobin Concentration : 32.5 g/dL  Auto Neutrophil # : x  Auto Lymphocyte # : x  Auto Monocyte # : x  Auto Eosinophil # : x  Auto Basophil # : x  Auto Neutrophil % : x  Auto Lymphocyte % : x  Auto Monocyte % : x  Auto Eosinophil % : x  Auto Basophil % : x    06-09    138  |  100  |  46[H]  ----------------------------<  191[H]  3.6   |  23  |  2.7[H]    Ca    7.3[L]      09 Jun 2025 05:20  Phos  6.4     06-09  Mg     2.2     06-09    TPro  4.6[L]  /  Alb  2.5[L]  /  TBili  0.4  /  DBili  x   /  AST  26  /  ALT  20  /  AlkPhos  128[H]  06-09    LIVER FUNCTIONS - ( 09 Jun 2025 05:20 )  Alb: 2.5 g/dL / Pro: 4.6 g/dL / ALK PHOS: 128 U/L / ALT: 20 U/L / AST: 26 U/L / GGT: x           PT/INR - ( 07 Jun 2025 22:51 )   PT: 18.10 sec;   INR: 1.52 ratio         PTT - ( 09 Jun 2025 05:20 )  PTT:68.0 sec  Urinalysis Basic - ( 09 Jun 2025 05:20 )    Color: x / Appearance: x / SG: x / pH: x  Gluc: 191 mg/dL / Ketone: x  / Bili: x / Urobili: x   Blood: x / Protein: x / Nitrite: x   Leuk Esterase: x / RBC: x / WBC x   Sq Epi: x / Non Sq Epi: x / Bacteria: x        Radiology: Reviewed Neurology Progress Note    Interval History:    Patient seen and examined today. Hb dropped overnight from 7.9 to 6.8, currently PRBC not given. Still on VEEG. Yesterday's EEG showed reduced burst suppression and currently on keppra 1500mg BID with sedation. This morning pt seen to be having frequent myoclonic jerks with EEG correlation.         Medications:  aspirin  chewable 81 milliGRAM(s) Oral daily  atorvastatin 80 milliGRAM(s) Oral at bedtime  chlorhexidine 0.12% Liquid 15 milliLiter(s) Oral Mucosa every 12 hours  chlorhexidine 2% Cloths 1 Application(s) Topical <User Schedule>  dextrose 5%. 1000 milliLiter(s) IV Continuous <Continuous>  dextrose 50% Injectable 25 Gram(s) IV Push once  dextrose Oral Gel 15 Gram(s) Oral once PRN  fentaNYL   Infusion 0.5 MICROgram(s)/kG/Hr IV Continuous <Continuous>  glucagon  Injectable 1 milliGRAM(s) IntraMuscular once  insulin lispro (ADMELOG) corrective regimen sliding scale   SubCutaneous three times a day before meals  lactated ringers. 500 milliLiter(s) IV Continuous <Continuous>  levETIRAcetam   Injectable 1500 milliGRAM(s) IV Push every 12 hours  piperacillin/tazobactam IVPB.. 3.375 Gram(s) IV Intermittent every 8 hours  polyethylene glycol 3350 17 Gram(s) Oral two times a day PRN  propofol Infusion 0.495 MICROgram(s)/kG/Min IV Continuous <Continuous>  senna 2 Tablet(s) Oral at bedtime      Vital Signs Last 24 Hrs  T(C): 36.5 (09 Jun 2025 08:00), Max: 37.5 (08 Jun 2025 12:00)  T(F): 97.7 (09 Jun 2025 08:00), Max: 99.5 (08 Jun 2025 12:00)  HR: 51 (09 Jun 2025 08:00) (47 - 61)  BP: 123/79 (09 Jun 2025 08:00) (112/56 - 178/118)  BP(mean): 75 (09 Jun 2025 08:00) (75 - 131)  RR: 34 (09 Jun 2025 08:00) (18 - 42)  SpO2: 100% (09 Jun 2025 08:00) (97% - 100%)    Parameters below as of 09 Jun 2025 08:00  Patient On (Oxygen Delivery Method): ventilator    O2 Concentration (%): 40    Neurological Exam:   General: frequent myoclonic jerks in b/l UE and LE  Mental status: Comatose, on propofol and fentanyl  ENT: gag and cough reflex +  Cranial nerves: Pupils equally round  about 2mm and reactive to light; corneal reflex present b/l; no oculocephalic reflex  Motor:  myoclonic jerks in UE and LE; no response to noxious stimuli   Sensation: no response to noxious stimuli   Gait: Narrow and steady. No ataxia.  Romberg negative    Labs:  CBC Full  -  ( 09 Jun 2025 06:34 )  WBC Count : 8.24 K/uL  RBC Count : 2.39 M/uL  Hemoglobin : 6.7 g/dL  Hematocrit : 20.6 %  Platelet Count - Automated : 172 K/uL  Mean Cell Volume : 86.2 fL  Mean Cell Hemoglobin : 28.0 pg  Mean Cell Hemoglobin Concentration : 32.5 g/dL  Auto Neutrophil # : x  Auto Lymphocyte # : x  Auto Monocyte # : x  Auto Eosinophil # : x  Auto Basophil # : x  Auto Neutrophil % : x  Auto Lymphocyte % : x  Auto Monocyte % : x  Auto Eosinophil % : x  Auto Basophil % : x    06-09    138  |  100  |  46[H]  ----------------------------<  191[H]  3.6   |  23  |  2.7[H]    Ca    7.3[L]      09 Jun 2025 05:20  Phos  6.4     06-09  Mg     2.2     06-09    TPro  4.6[L]  /  Alb  2.5[L]  /  TBili  0.4  /  DBili  x   /  AST  26  /  ALT  20  /  AlkPhos  128[H]  06-09    LIVER FUNCTIONS - ( 09 Jun 2025 05:20 )  Alb: 2.5 g/dL / Pro: 4.6 g/dL / ALK PHOS: 128 U/L / ALT: 20 U/L / AST: 26 U/L / GGT: x           PT/INR - ( 07 Jun 2025 22:51 )   PT: 18.10 sec;   INR: 1.52 ratio         PTT - ( 09 Jun 2025 05:20 )  PTT:68.0 sec  Urinalysis Basic - ( 09 Jun 2025 05:20 )    Color: x / Appearance: x / SG: x / pH: x  Gluc: 191 mg/dL / Ketone: x  / Bili: x / Urobili: x   Blood: x / Protein: x / Nitrite: x   Leuk Esterase: x / RBC: x / WBC x   Sq Epi: x / Non Sq Epi: x / Bacteria: x        Radiology: Reviewed

## 2025-06-09 NOTE — PROGRESS NOTE ADULT - SUBJECTIVE AND OBJECTIVE BOX
CC: cardiac arrest    HPI: SOB     Patient is a 69 year old male with PMH of DM, HTN, HLD, and CAD (CABG 2024) presenting post-cardiac arrest. Per EMS, 911 was called by family after patient had a witnessed cardiac arrest at home; EMS states they arrived to the home shortly thereafter (approximately 5 minutes); initial rhythm PEA, patient intubated in the field, and ROSC after two rounds of CPR with Epi x2 given.   Spoke with wife, patient was fine all day, before he had the arrest he was lying down, complained of SOB them passed out.     Interval history  -Patient seen at bedside with partner Lori  -Patient unable to      ADVANCE DIRECTIVES:     [ ] Full Code [ x] DNR  MOLST  [ ]  Living Will  [ ]   DECISION MAKER(s):  [ ] Health Care Proxy(s)  [x ] Surrogate(s)  [ ] Guardian           Name(s): Phone Number(s):  Partner Lori Mayfield       BASELINE (I)ADL(s) (prior to admission):    [place]: [ ]Total  [ ] Moderate [ ]Dependent  Palliative Performance Status Version 2:         %    http://npcrc.org/files/news/palliative_performance_scale_ppsv2.pdf    Allergies    No Known Allergies    Intolerances    MEDICATIONS  (STANDING):  aspirin  chewable 81 milliGRAM(s) Oral daily  atorvastatin 80 milliGRAM(s) Oral at bedtime  chlorhexidine 0.12% Liquid 15 milliLiter(s) Oral Mucosa every 12 hours  chlorhexidine 2% Cloths 1 Application(s) Topical <User Schedule>  dextrose 5%. 1000 milliLiter(s) (50 mL/Hr) IV Continuous <Continuous>  dextrose 50% Injectable 25 Gram(s) IV Push once  fentaNYL   Infusion 0.5 MICROgram(s)/kG/Hr (4.38 mL/Hr) IV Continuous <Continuous>  glucagon  Injectable 1 milliGRAM(s) IntraMuscular once  insulin lispro (ADMELOG) corrective regimen sliding scale   SubCutaneous every 6 hours  lacosamide Solution 150 milliGRAM(s) Oral two times a day  lactated ringers. 500 milliLiter(s) (100 mL/Hr) IV Continuous <Continuous>  levETIRAcetam   Injectable 1500 milliGRAM(s) IV Push every 12 hours  naloxegol 12.5 milliGRAM(s) Oral daily  piperacillin/tazobactam IVPB.. 3.375 Gram(s) IV Intermittent every 8 hours  polyethylene glycol 3350 17 Gram(s) Oral two times a day  propofol Infusion 0.495 MICROgram(s)/kG/Min (0.26 mL/Hr) IV Continuous <Continuous>  senna 2 Tablet(s) Oral at bedtime    MEDICATIONS  (PRN):  dextrose Oral Gel 15 Gram(s) Oral once PRN Blood Glucose LESS THAN 70 milliGRAM(s)/deciliter      PRESENT SYMPTOMS: [ x]Unable to obtain due to poor mentation   Source if other than patient:  [ ]Family   [ ]Team     Pain: [ ]yes [ ]no  ALL PAIN ASSESSMENT COMPONENTS WERE ASKED ABOUT UNLESS OTHERWISE NOTED  QOL impact -   Location -                    Aggravating factors -  Quality -  Radiation -  Timing-  Severity (0-10 scale):  Minimal acceptable level (0-10 scale):     CPOT:  0  https://[email or web address]/getattachment/[email or web address]/Critical-Care-Pain-Observation-Tool-(CPOT)    PAIN AD Score:   [email or web address]/painad.pdf (press ctrl +  left click to view)    Dyspnea:                           [ ]None[ ]Mild [ ]Moderate [ ]Severe     Respiratory Distress Observation Scale (RDOS): 0  A score of 0 to 2 signifies little or no respiratory distress, 3 signifies mild distress, scores 4 to 6 indicate moderate distress, and scores greater than 7 signify severe distress  [email or web address]/sites/default/files/PDFS/800933-lczmsltyvzp-gdigzndn-knwvwsnoovd-kmxan.pdf    Anxiety:                             [ ]None[ ]Mild [ ]Moderate [ ]Severe   Fatigue:                             [ ]None[ ]Mild [ ]Moderate [ ]Severe   Nausea:                             [ ]None[ ]Mild [ ]Moderate [ ]Severe   Loss of appetite:              [ ]None[ ]Mild [ ]Moderate [ ]Severe   Constipation:                    [ ]None[ ]Mild [ ]Moderate [ ]Severe    Other Symptoms:  [ ]All other review of systems negative     Palliative Performance Status Version 2:        10%    [email or web address]/news/palliative_performance_scale_[email or web address].pdf    PHYSICAL EXAM:  Vital Signs Last 24 Hrs  T(C): 37.1 (09 Jun 2025 12:00), Max: 37.3 (08 Jun 2025 20:00)  T(F): 98.8 (09 Jun 2025 12:00), Max: 99.1 (08 Jun 2025 20:00)  HR: 66 (09 Jun 2025 12:00) (47 - 66)  BP: 148/65 (09 Jun 2025 12:00) (120/54 - 178/118)  BP(mean): 94 (09 Jun 2025 12:00) (75 - 131)  RR: 20 (09 Jun 2025 12:00) (18 - 44)  SpO2: 99% (09 Jun 2025 14:19) (94% - 100%)    Parameters below as of 09 Jun 2025 08:00  Patient On (Oxygen Delivery Method): ventilator    O2 Concentration (%): 40    GENERAL:  [ ] No acute distress [ ]Lethargic  [ x]Unarousable  [ ]Verbal  [ ]Non-Verbal [ ]Cachexia    BEHAVIORAL/PSYCH:  [ ]Alert and Oriented x  [ ] Anxiety [ ] Delirium [ ] Agitation [x ] Calm   EYES: [x ] No scleral icterus [ ] Scleral icterus [ ] Closed  ENMT:  [ ]Dry mouth  [x ]No external oral lesions [ ] No external ear or nose lesions  CARDIOVASCULAR:  [ ]Regular [ ]Irregular [ ]Tachy [ ]Not Tachy  [ ]Dre [ ] Edema [ ] No edema  PULMONARY:  [ ]Tachypnea  [ ]Audible excessive secretions [x ] No labored breathing [ ] labored breathing  GASTROINTESTINAL: [ ]Soft  [ ]Distended  [ ]Not distended [ ]Non tender [ ]Tender  MUSCULOSKELETAL: [ ]No clubbing [ ] clubbing  [ ] No cyanosis [ ] cyanosis  NEUROLOGIC: [ ]No focal deficits  [ ]Follows commands  [ x]Does not follow commands  [ ]Cognitive impairment  [ ]Dysphagia  [ ]Dysarthria  [ ]Paresis   SKIN: [ ] Jaundiced [ ] Non-jaundiced [ ]Rash [ ]No Rash [ ] Warm [ ] Dry  MISC/LINES: [x ] ET tube [ ] Trach [ ]NGT/OGT [ ]PEG [ ]Cueto    LABS: Interpreted by me - BMP shows elevated creatinine, CBC shows anemia and leukocytosis, LFTs show low total protein and albumin                                   8.1    10.27 )-----------( 162      ( 09 Jun 2025 11:26 )             24.8       06-09    138  |  100  |  46[H]  ----------------------------<  191[H]  3.6   |  23  |  2.7[H]    Ca    7.3[L]      09 Jun 2025 05:20  Phos  6.4     06-09  Mg     2.2     06-09    TPro  4.6[L]  /  Alb  2.5[L]  /  TBili  0.4  /  DBili  x   /  AST  26  /  ALT  20  /  AlkPhos  128[H]  06-09          ABG - ( 08 Jun 2025 03:01 )  pH, Arterial: 7.37  pH, Blood: x     /  pCO2: 35    /  pO2: 100   / HCO3: 20    / Base Excess: -4.4  /  SaO2: 98.5                Urinalysis Basic - ( 09 Jun 2025 05:20 )    Color: x / Appearance: x / SG: x / pH: x  Gluc: 191 mg/dL / Ketone: x  / Bili: x / Urobili: x   Blood: x / Protein: x / Nitrite: x   Leuk Esterase: x / RBC: x / WBC x   Sq Epi: x / Non Sq Epi: x / Bacteria: x        PT/INR - ( 07 Jun 2025 22:51 )   PT: 18.10 sec;   INR: 1.52 ratio         PTT - ( 09 Jun 2025 05:20 )  PTT:68.0 sec          CAPILLARY BLOOD GLUCOSE      POCT Blood Glucose.: 208 mg/dL (09 Jun 2025 11:08)              RADIOLOGY & ADDITIONAL STUDIES: Interpreted by me    < from: Xray Chest 1 View- PORTABLE-Routine (Xray Chest 1 View- PORTABLE-Routine in AM.) (06.09.25 @ 06:23) >  IMPRESSION:    Stable bilateral lung opacities    < end of copied text >        EKG: Interpreted by me    < from: 12 Lead ECG (06.06.25 @ 00:53) >  Ventricular Rate 74 BPM    Atrial Rate 74 BPM    P-R Interval 154 ms    QRS Duration 98 ms    Q-T Interval 396 ms    QTC Calculation(Bazett) 439 ms    P Axis 73 degrees    R Axis 87 degrees    T Axis 162 degrees    Diagnosis Line Normal sinus rhythm  ST & T wave abnormality, consider inferior ischemia  Abnormal ECG    < end of copied text >        PROTEIN CALORIE MALNUTRITION PRESENT: [ ]mild [ ]moderate [ ]severe [ ]underweight [ ]morbid obesity  https://www.andeal.org/vault/2440/web/files/ONC/Table_Clinical%20Characteristics%20to%20Document%20Malnutrition-White%20JV%20et%20al%202012.pdf    Height (cm): 177.8 (06-06-25 @ 04:00), 172.7 (06-03-25 @ 12:51)  Weight (kg): 87.6 (06-06-25 @ 04:00), 97 (06-01-25 @ 01:59)  BMI (kg/m2): 27.7 (06-06-25 @ 04:00), 32.5 (06-03-25 @ 12:51)  [ ]PPSV2 < or = to 30% [ ]significant weight loss  [ ]poor nutritional intake  [ ]anasarca      [ ]Artificial Nutrition      Palliative Care Spiritual/Emotional Screening Tool Question  Severity (0-4):                    OR                    [ x] Unable to determine. Will assess at later time if appropriate.  Score of 2 or greater indicates recommendation of Chaplaincy and/or SW referral  Chaplaincy Referral: [ ] Yes [ ] Refused [ ] Following     Caregiver [place]:  [ ] Yes [ ] No    OR    [x ] Unable to determine. Will assess at later time if appropriate.  Social Work Referral [ ]  Patient and Family Centered Care Referral [ ]    Anticipatory Grief Present: [ x] Yes [ ] No    OR     [ ] Unable to determine. Will assess at later time if appropriate.  Social Work Referral [x ]  Patient and Family Centered Care Referral [ ]    Patient discussed with primary medical team MD  Palliative care education provided to patient and/or family

## 2025-06-09 NOTE — PROGRESS NOTE ADULT - ASSESSMENT
69 year old male with PMH of DM, HTN, HLD, and CAD (CABG 2024) presenting post-cardiac arrest  Patient with ROSC after 9min and is now intubated.  Palliative care consulted for [facility].    Spoke with patient's partner, Lori in patient's room. Palliative care reintroduced. Dr. García provided a medical update and hospital course.  We discussed the patient's prognosis and noted that he was not clinically improving, and that ongoing aggressive medical management would require trach/PEG and ongoing poor quality of life.  We discussed the option of comfort measures only and palliative extubation. Patient's partner would like to pursue comfort measures only and palliative extubation tomorrow at 11am. No escalation of care until that time. All questions answered.    Plan  -DNR/intubate  -no escalation of care  -plan for palliative extubation and comfort measures only tomorrow at 11am  -vent management and rest of care per primary ICU team  -will follow    Education about palliative care provided to patient/family.  Discussed with Dr. García    Please call [phone] with questions or concerns 24/7.

## 2025-06-09 NOTE — CHART NOTE - NSCHARTNOTEFT_GEN_A_CORE
Per Dr. Marcus, palliative team had a in-depth conversation with the family. Family is requesting no escalation of care and to continue with ongoing management at this time. Patient is scheduled to have palliative extubation tomorrow 6/10/2025 at 11: 00 AM alongside family. Will update the night team.

## 2025-06-09 NOTE — CHART NOTE - NSCHARTNOTEFT_GEN_A_CORE
Patient currently has one peripheral IV. A second IV was attempted to be placed however patient is refusing. I explained that as a GI bleed patient he may decompensate rapidly and therefore need adequate access. He understands this and has declined another IV.       Irena Lizarraga, DO  Emergency Medicine PGY1

## 2025-06-09 NOTE — PHARMACOTHERAPY INTERVENTION NOTE - INTERVENTION TYPE RECOOMEND
Route of Administration Change
Therapy Recommended - Alternative treatment
Timing/Frequency of Administration Recommended
Therapy Recommended - Additional therapy

## 2025-06-09 NOTE — PROGRESS NOTE ADULT - ASSESSMENT
Patient is a 69 year old male with PMH of DM, HTN, HLD, and CAD (CABG 2024) presenting post-cardiac arrest. Per EMS, 911 was called by family after patient had a witnessed cardiac arrest at home; EMS states they arrived to the home shortly thereafter (approximately 5 minutes); Neurology consulted for myoclonic jerks. EEG still showing burst suppression and epileptic activity associated with myoclonus likely secondary to underlying anoxic injury. He will need additional ASM, and will likely need to increase seadation as well for now.    Recommendations:  - Vimpat 300mg IV load STAT and then 150mg BID  - Continue Keppra 1500 Q12  - c/w vEEG monitoring  - will avoid going down on sedation for now given myoclonic jerks and abnormal EEG  - prognosis for meaningful recovery likely poor  - address GOCs w/ family  - supportive care as per ICU team    Plan discussed with ICU team and neurology attending

## 2025-06-09 NOTE — PROGRESS NOTE ADULT - SUBJECTIVE AND OBJECTIVE BOX
MICU RESIDENT PROGRESS NOTE    Patient is a 69y old  Male who presents with a chief complaint of cardiac arrest (09 Jun 2025 08:52)        INTERVAL HPI/OVERNIGHT EVENTS:   Overnight with a Hb drop from 7.9 to 6.8   No signs of overt bleeding  Remains unresponsive to all stimuli        ICU Vital Signs Last 24 Hrs  T(C): 36.5 (09 Jun 2025 08:00), Max: 37.5 (08 Jun 2025 12:00)  T(F): 97.7 (09 Jun 2025 08:00), Max: 99.5 (08 Jun 2025 12:00)  HR: 51 (09 Jun 2025 08:00) (47 - 61)  BP: 123/79 (09 Jun 2025 08:00) (112/56 - 178/118)  BP(mean): 75 (09 Jun 2025 08:00) (75 - 131)  ABP: --  ABP(mean): --  RR: 34 (09 Jun 2025 08:00) (18 - 42)  SpO2: 100% (09 Jun 2025 08:00) (97% - 100%)    O2 Parameters below as of 09 Jun 2025 08:00  Patient On (Oxygen Delivery Method): ventilator    O2 Concentration (%): 40      I&O's Summary    08 Jun 2025 07:01  -  09 Jun 2025 07:00  --------------------------------------------------------  IN: 2814.9 mL / OUT: 1555 mL / NET: 1259.9 mL    09 Jun 2025 07:01  -  09 Jun 2025 09:53  --------------------------------------------------------  IN: 43.8 mL / OUT: 35 mL / NET: 8.8 mL      Mode: AC/ CMV (Assist Control/ Continuous Mandatory Ventilation)  RR (machine): 20  TV (machine): 440  FiO2: 40  PEEP: 8  ITime: 0.9  MAP: 10  PIP: 26      LABS:                        6.7    8.24  )-----------( 172      ( 09 Jun 2025 06:34 )             20.6     06-09    138  |  100  |  46[H]  ----------------------------<  191[H]  3.6   |  23  |  2.7[H]    Ca    7.3[L]      09 Jun 2025 05:20  Phos  6.4     06-09  Mg     2.2     06-09    TPro  4.6[L]  /  Alb  2.5[L]  /  TBili  0.4  /  DBili  x   /  AST  26  /  ALT  20  /  AlkPhos  128[H]  06-09    PT/INR - ( 07 Jun 2025 22:51 )   PT: 18.10 sec;   INR: 1.52 ratio         PTT - ( 09 Jun 2025 05:20 )  PTT:68.0 sec  Urinalysis Basic - ( 09 Jun 2025 05:20 )    Color: x / Appearance: x / SG: x / pH: x  Gluc: 191 mg/dL / Ketone: x  / Bili: x / Urobili: x   Blood: x / Protein: x / Nitrite: x   Leuk Esterase: x / RBC: x / WBC x   Sq Epi: x / Non Sq Epi: x / Bacteria: x      CAPILLARY BLOOD GLUCOSE      POCT Blood Glucose.: 218 mg/dL (09 Jun 2025 05:11)  POCT Blood Glucose.: 191 mg/dL (09 Jun 2025 04:04)  POCT Blood Glucose.: 225 mg/dL (08 Jun 2025 23:37)  POCT Blood Glucose.: 130 mg/dL (08 Jun 2025 22:15)  POCT Blood Glucose.: 91 mg/dL (08 Jun 2025 18:45)  POCT Blood Glucose.: 119 mg/dL (08 Jun 2025 16:54)  POCT Blood Glucose.: 93 mg/dL (08 Jun 2025 11:39)    ABG - ( 08 Jun 2025 03:01 )  pH, Arterial: 7.37  pH, Blood: x     /  pCO2: 35    /  pO2: 100   / HCO3: 20    / Base Excess: -4.4  /  SaO2: 98.5                RADIOLOGY & ADDITIONAL TESTS:    Consultant(s) Notes Reviewed:  [x ] YES  [ ] NO    MEDICATIONS  (STANDING):  aspirin  chewable 81 milliGRAM(s) Oral daily  atorvastatin 80 milliGRAM(s) Oral at bedtime  chlorhexidine 0.12% Liquid 15 milliLiter(s) Oral Mucosa every 12 hours  chlorhexidine 2% Cloths 1 Application(s) Topical <User Schedule>  dextrose 5%. 1000 milliLiter(s) (50 mL/Hr) IV Continuous <Continuous>  dextrose 50% Injectable 25 Gram(s) IV Push once  fentaNYL   Infusion 0.5 MICROgram(s)/kG/Hr (4.38 mL/Hr) IV Continuous <Continuous>  glucagon  Injectable 1 milliGRAM(s) IntraMuscular once  insulin lispro (ADMELOG) corrective regimen sliding scale   SubCutaneous three times a day before meals  lactated ringers. 500 milliLiter(s) (100 mL/Hr) IV Continuous <Continuous>  levETIRAcetam   Injectable 1500 milliGRAM(s) IV Push every 12 hours  piperacillin/tazobactam IVPB.. 3.375 Gram(s) IV Intermittent every 8 hours  propofol Infusion 0.495 MICROgram(s)/kG/Min (0.26 mL/Hr) IV Continuous <Continuous>  senna 2 Tablet(s) Oral at bedtime    MEDICATIONS  (PRN):  dextrose Oral Gel 15 Gram(s) Oral once PRN Blood Glucose LESS THAN 70 milliGRAM(s)/deciliter  polyethylene glycol 3350 17 Gram(s) Oral two times a day PRN Constipation      PHYSICAL EXAM:  GENERAL:   HEAD:  Atraumatic, Normocephalic  EYES: EOMI, PERRLA, conjunctiva and sclera clear  NECK: Supple, No JVD, Normal thyroid, no enlarged nodes  NERVOUS SYSTEM:  Intubated, sedated, GCS3T  CHEST/LUNG: B/L good air entry; No rales, rhonchi, or wheezing  HEART: S1S2 normal, no S3, Regular rate and rhythm; No murmurs  ABDOMEN: Soft, Nontender, Nondistended; Bowel sounds present  EXTREMITIES:  2+ Peripheral Pulses, No clubbing, cyanosis, or edema  LYMPH: No lymphadenopathy noted  SKIN: No rashes or lesions

## 2025-06-09 NOTE — CHART NOTE - NSCHARTNOTEFT_GEN_A_CORE
Pt had a Hg drop (7.9 yesterday noon to 6.8 this AM). His Hg has been dropping slowly since admission. He has no active bleeding.     - Sent 2nd type and screen stat.   - Sent a repeat cbc  - will hold heparin ggt for now Pt had a Hg drop (7.9 yesterday noon to 6.8 this AM). His Hg has been dropping slowly since admission. He has no active bleeding.     - Sent 2nd type and screen stat.   - Sent a repeat cbc  - will hold heparin ggt for now (On AC for A.fib)

## 2025-06-09 NOTE — PROGRESS NOTE ADULT - ASSESSMENT
# Acute Hypoxemic respiratory failure   # Anoxic brain injury  # Cardiac arrest, OOH, witnessed  with 9 min downtime  # Possible aspiration   # Bilateral effusions   # HO HFPEF with recent admission for HFPEF exacerbation and HTN emergency  # CKD   # DM, HTN, HLD, and CAD SP CABG 2024  # HO Afib     PLAN:     CNS:   - Continue vEEG  - CT with anoxic brain injury   - MRI brain for neuroprognostication   - NSE pending   - Avoid hyperthermia  - F/u Neuro recs  - Keppra BID     HEENT:   - Oral care  - ETT care    PULM:   - Aspiration precautions  - Minimal vent settings  - Monitor airway pressures  - SBT if wakes up     CARDIO:   - F/u echo   - Hep gtt for cardiac therapy     GI:   - GI ppx   - Tube feeding  - Bowel regimen     ID:   - Continue Zosyn  - F/u cultures    HEME:   - Hep gtt turned off  - Trend H&H, rpt CBC 1100   - Will need transfusion if not made CMO     ENDO:   - Insulin protocol    MSK:   - Bedrest     MICU  B/l PIVs  DNR/intubate

## 2025-06-10 NOTE — PHARMACOTHERAPY INTERVENTION NOTE - COMMENTS
FREDRICK HOFFMAN 69y Male    Assessed patient medications for high-risk medications:    Benzodiazepines  Opioids  High-anticholinergic medications  Sedatives/ sleep medications  Muscle relaxants  Tricyclic antidepressants  Antipsychotics    aspirin  chewable 81 milliGRAM(s) Oral daily  atorvastatin 80 milliGRAM(s) Oral at bedtime  chlorhexidine 0.12% Liquid 15 milliLiter(s) Oral Mucosa every 12 hours  chlorhexidine 2% Cloths 1 Application(s) Topical <User Schedule>  dextrose 5%. 1000 milliLiter(s) IV Continuous <Continuous>  dextrose 50% Injectable 25 Gram(s) IV Push once  dextrose Oral Gel 15 Gram(s) Oral once PRN  fentaNYL   Infusion 0.5 MICROgram(s)/kG/Hr IV Continuous <Continuous>  glucagon  Injectable 1 milliGRAM(s) IntraMuscular once  insulin lispro (ADMELOG) corrective regimen sliding scale   SubCutaneous every 6 hours  lacosamide Solution 150 milliGRAM(s) Oral two times a day  lactated ringers. 500 milliLiter(s) IV Continuous <Continuous>  levETIRAcetam   Injectable 1500 milliGRAM(s) IV Push every 12 hours  naloxegol 12.5 milliGRAM(s) Oral daily  piperacillin/tazobactam IVPB.. 3.375 Gram(s) IV Intermittent every 8 hours  polyethylene glycol 3350 17 Gram(s) Oral two times a day  propofol Infusion 0.495 MICROgram(s)/kG/Min IV Continuous <Continuous>  senna 2 Tablet(s) Oral at bedtime      [ x  ] Team informed of high risk medications   [     ] None of the above medications identified.   [ x   ] Discussed with prescriber and no interventions at this time    Discussed with prescriber, if appropriate, to consider:   [      ]HOLDING   [      ]REDUCING  [  x  ]CONTINUE-fentanyl drip, analgosedation while on ventilator, will monitor for ABEBA  -levetiracetam, per neurology, will monitor for ABEBA  
FREDRICK HOFFMAN 69y Male    Assessed patient medications for high-risk medications:    Benzodiazepines  Opioids  High-anticholinergic medications  Sedatives/ sleep medications  Muscle relaxants  Tricyclic antidepressants  Antipsychotics    aspirin  chewable 81 milliGRAM(s) Oral daily  atorvastatin 80 milliGRAM(s) Oral at bedtime  chlorhexidine 0.12% Liquid 15 milliLiter(s) Oral Mucosa every 12 hours  chlorhexidine 2% Cloths 1 Application(s) Topical <User Schedule>  dextrose 5%. 1000 milliLiter(s) IV Continuous <Continuous>  dextrose 50% Injectable 25 Gram(s) IV Push once  dextrose Oral Gel 15 Gram(s) Oral once PRN  fentaNYL   Infusion 0.5 MICROgram(s)/kG/Hr IV Continuous <Continuous>  glucagon  Injectable 1 milliGRAM(s) IntraMuscular once  insulin lispro (ADMELOG) corrective regimen sliding scale   SubCutaneous every 6 hours  lacosamide Solution 150 milliGRAM(s) Oral two times a day  lactated ringers. 500 milliLiter(s) IV Continuous <Continuous>  levETIRAcetam   Injectable 1500 milliGRAM(s) IV Push every 12 hours  naloxegol 12.5 milliGRAM(s) Oral daily  piperacillin/tazobactam IVPB.. 3.375 Gram(s) IV Intermittent every 8 hours  polyethylene glycol 3350 17 Gram(s) Oral two times a day  propofol Infusion. 20 MICROgram(s)/kG/Min IV Continuous <Continuous>  senna 2 Tablet(s) Oral at bedtime      [  x   ] Team informed of high risk medications   [     ] None of the above medications identified.   [  x  ] Discussed with prescriber and no interventions at this time    Discussed with prescriber, if appropriate, to consider:   [      ]HOLDING   [      ]REDUCING  [   x ]CONTINUE fentanyl drip, analgosedation while on ventilator, plan for CMO  
d/w med team, recommended adding naloxegol 12.5mg og daily  -Miralax 17g og q12h
Lispro ss ac, FS 200s, d/w med team, recommended changing to q6h
lacosamide 300mg IVP, d/w team to change to IVPB
meds via OG, d/w team to change lacosamide tab (can't crush) to solution

## 2025-06-10 NOTE — PROGRESS NOTE ADULT - ASSESSMENT
69 year old male with PMH of DM, HTN, HLD, and CAD (CABG 2024) presenting post-cardiac arrest  Patient with ROSC after 9min and is now intubated.  Palliative care consulted for [facility].    Spoke with patient's partner outside MICU. Plan is palliative vent removal and comfort measures only after all family arrive.  All questions answered.    Plan  -DNR/intubate  -no escalation of care  -plan for palliative extubation and comfort measures only today after all family arrive  -will follow    Plan for palliative extubation after family arrives and alerts team that they wish to initiate comfort measures only  Patient to be palliatively extubated, using the compassionate extubation protocol as a guideline.   Please note that providers may choose to vary from the protocol based on the clinical needs of the patient and other factors. General overview of the protocol to be followed:    -continue fentanyl infusion at current rate  (1) Give dilaudid 1mg IV prior to Pressure Support trial  (2) Wait 10-15 minutes  (3) Place on Pressure Support (5 cm H20) and monitor for 1-2 minutes  (4) If patient is comfortable, can proceed with compassionate extubation at this time and proceed to step 9; (if patient to be extubated at a later time, place back on previous vent setting until planned extubation time; proceed to step 8)  (5) If patient is not comfortable, place back previous vent settings, and increase IV opioid dose by doubling previous dose (dilaudid 2mg IV)  (6) 10-15 minutes after IV opioid, place back on PS as above, and monitor for 1-2 minutes  (7) Above steps to be repeated until ideal dose for patient’s comfort achieved, but for no longer than 60 minutes total duration  (8) 10-15 minutes prior to extubation, provide IV opioid (at required dose for appropriate symptom management as above), IV ativan 1mg, and IV glycopyrrolate 0.2mg  (9) After extubation, continue IV opioid (at required dose for appropriate symptom management as above) q15min PRN for pain/dypnea, ativan 1mg IV q30min PRN for anxiety/agitation, and glycopyrrolate 0.2mg IV q6h standing for secretions    Link to [facility] compassionate extubation policy: [email or web address]%20for%20Palliative%[email or web address]      Education about palliative care provided to patient/family.  Discussed with Dr. Lizarraga    Please call [phone] with questions or concerns 24/7.

## 2025-06-10 NOTE — CHART NOTE - NSCHARTNOTEFT_GEN_A_CORE
Provider:                                              Met with: [ x  ] Patient  [ x  ] Family  [   ] Other:         Primary Language: [  x ] English [   ] Other*:                      *Interpretation provided by:         SUPPORT DIAGNOSES            (Check all that apply)         [   ] EOL issues    [  x ] Advanced Illness    [   ] Cultural / spiritual concerns    [   ] Pain / suffering    [   ] Dementia / AMS    [   ] Other:    [   ] AD issues    [   ] Grief / loss / sadness    [   ] Discharge issues    [   ] Distress / coping         PSYCHOSOCIAL ASSESSMENT OF PATIENT         (Check all that apply)         [ X  ] Initial Assessment            [   ] Reassessment          [   ] Not Applicable this visit         Pain/suffering acuity:    [x   ] None to mild (0-3)           [   ] Moderate (4-6)        [   ] High (7-10)         Mental Status:    [   ] Alert/oriented (x3)          [   ] Confused/Altered(x2/x1)         [ x  ] Non-resp         Functional status:    [   ] Independent w ADLs      [   ] Needs Assistance             [ x  ] Bedbound/Full Care         Coping:    [   ] Coping well                     [  ] Coping w/difficulty            [   ] Poor coping    [x] unable to assess         Support system:    [ x ] Strong                              [   ] Adequate                        [   ] Inadequate              Past history and medications for:         [ ] Anxiety       [ ] Depression    [ ] Sleep disorders              SERVICE PROVIDED    [   ]Discharge support / facilitation    [  x ]AD / goals of care counseling                                  [   ]EOL / death / bereavement counseling    [ X  ]Counseling / support                                                [   ] Family meeting    [   ]Prayer / sacrament / ritual                                      [   ] Referral    [   ]Other                                                                           NOTE and Plan of Care (PoC):      69 year old male with PMH of DM, HTN, HLD, and CAD (CABG ) presenting post-cardiac arrest  Patient with ROSC after 9min and is now intubated.  Palliative care consulted for [facility].  LMSW met with PT's partner: Lori and role of Palliative Care SW introduced. Lori verbalized understanding. Family present and visiting PT at bedside prior to compassionate extubation. Pt's partner requested information regarding  home and morgue process. Explained that once a  home is decided they will communicate with [facility] and organize a transfer. Questions answered and supportive counseling provided. Palliative Care contact provided. Will continue to follow for support. [phone]

## 2025-06-10 NOTE — PROGRESS NOTE ADULT - PROVIDER SPECIALTY LIST ADULT
MICU
MICU
Neurology
Neurology
Critical Care
Critical Care
MICU
Neurology
Critical Care
Critical Care
MICU
Palliative Care
Palliative Care

## 2025-06-10 NOTE — PROGRESS NOTE ADULT - REASON FOR ADMISSION
cardiac arrest

## 2025-06-10 NOTE — PROGRESS NOTE ADULT - SUBJECTIVE AND OBJECTIVE BOX
MICU RESIDENT PROGRESS NOTE    Patient is a 69y old  Male who presents with a chief complaint of cardiac arrest (10 Lambert 2025 06:02)        INTERVAL HPI/OVERNIGHT EVENTS:   Overnight, Pt  Afebrile, hemodynamically stable     GCS:  Sedatives:  Pressors:  Lines  CENTRAL LINE| Yes: [ ] | No: [ ]  INDWELLING JURADO| Yes: [ ] | No: [ ]      ICU Vital Signs Last 24 Hrs  T(C): 35.5 (10 Lambert 2025 09:00), Max: 37.7 (09 Jun 2025 16:00)  T(F): 95.9 (10 Lambert 2025 09:00), Max: 99.8 (09 Jun 2025 16:00)  HR: 57 (10 Lambert 2025 09:00) (36 - 66)  BP: 101/59 (10 Lambert 2025 09:00) (101/59 - 166/68)  BP(mean): 73 (10 Lambert 2025 09:00) (73 - 118)  ABP: --  ABP(mean): --  RR: 25 (10 Lambert 2025 09:00) (5 - 36)  SpO2: 93% (10 Lambert 2025 09:00) (93% - 100%)    O2 Parameters below as of 10 Lambert 2025 09:00  Patient On (Oxygen Delivery Method): ventilator    O2 Concentration (%): 40      I&O's Summary    09 Jun 2025 07:01  -  10 Lambert 2025 07:00  --------------------------------------------------------  IN: 2539.1 mL / OUT: 1100 mL / NET: 1439.1 mL    10 Lambert 2025 07:01  -  10 Lambert 2025 09:50  --------------------------------------------------------  IN: 35 mL / OUT: 95 mL / NET: -60 mL      Mode: AC/ CMV (Assist Control/ Continuous Mandatory Ventilation)  RR (machine): 20  TV (machine): 440  FiO2: 40  PEEP: 8  ITime: 0.9  MAP: 15  PIP: 26      LABS:                        7.4    7.53  )-----------( 179      ( 10 Lambert 2025 04:27 )             22.9     06-10    134[L]  |  98  |  50[H]  ----------------------------<  182[H]  4.0   |  20  |  2.8[H]    Ca    7.6[L]      10 Lambert 2025 04:27  Phos  7.2     06-10  Mg     2.2     06-10    TPro  4.8[L]  /  Alb  2.5[L]  /  TBili  0.5  /  DBili  x   /  AST  35  /  ALT  18  /  AlkPhos  157[H]  06-10    PTT - ( 09 Jun 2025 05:20 )  PTT:68.0 sec  Urinalysis Basic - ( 10 Lambert 2025 04:27 )    Color: x / Appearance: x / SG: x / pH: x  Gluc: 182 mg/dL / Ketone: x  / Bili: x / Urobili: x   Blood: x / Protein: x / Nitrite: x   Leuk Esterase: x / RBC: x / WBC x   Sq Epi: x / Non Sq Epi: x / Bacteria: x      CAPILLARY BLOOD GLUCOSE      POCT Blood Glucose.: 191 mg/dL (10 Lambert 2025 06:29)  POCT Blood Glucose.: 280 mg/dL (10 Lambert 2025 00:12)  POCT Blood Glucose.: 157 mg/dL (09 Jun 2025 17:41)  POCT Blood Glucose.: 208 mg/dL (09 Jun 2025 11:08)        RADIOLOGY & ADDITIONAL TESTS:    Consultant(s) Notes Reviewed:  [x ] YES  [ ] NO    MEDICATIONS  (STANDING):  aspirin  chewable 81 milliGRAM(s) Oral daily  atorvastatin 80 milliGRAM(s) Oral at bedtime  chlorhexidine 0.12% Liquid 15 milliLiter(s) Oral Mucosa every 12 hours  chlorhexidine 2% Cloths 1 Application(s) Topical <User Schedule>  dextrose 5%. 1000 milliLiter(s) (50 mL/Hr) IV Continuous <Continuous>  dextrose 50% Injectable 25 Gram(s) IV Push once  fentaNYL   Infusion 0.5 MICROgram(s)/kG/Hr (4.38 mL/Hr) IV Continuous <Continuous>  glucagon  Injectable 1 milliGRAM(s) IntraMuscular once  insulin lispro (ADMELOG) corrective regimen sliding scale   SubCutaneous every 6 hours  lacosamide Solution 150 milliGRAM(s) Oral two times a day  lactated ringers. 500 milliLiter(s) (100 mL/Hr) IV Continuous <Continuous>  levETIRAcetam   Injectable 1500 milliGRAM(s) IV Push every 12 hours  naloxegol 12.5 milliGRAM(s) Oral daily  piperacillin/tazobactam IVPB.. 3.375 Gram(s) IV Intermittent every 8 hours  polyethylene glycol 3350 17 Gram(s) Oral two times a day  propofol Infusion. 20 MICROgram(s)/kG/Min (10.5 mL/Hr) IV Continuous <Continuous>  senna 2 Tablet(s) Oral at bedtime    MEDICATIONS  (PRN):  dextrose Oral Gel 15 Gram(s) Oral once PRN Blood Glucose LESS THAN 70 milliGRAM(s)/deciliter      PHYSICAL EXAM:  GENERAL:   HEAD:  Atraumatic, Normocephalic  EYES: EOMI, PERRLA, conjunctiva and sclera clear  NECK: Supple, No JVD, Normal thyroid, no enlarged nodes  NERVOUS SYSTEM:  GCS3T  CHEST/LUNG: B/L good air entry; No rales, rhonchi, or wheezing  HEART: S1S2 normal, no S3, Regular rate and rhythm; No murmurs  ABDOMEN: Soft, Nontender, Nondistended; Bowel sounds present  EXTREMITIES:  2+ Peripheral Pulses, No clubbing, cyanosis, or edema  LYMPH: No lymphadenopathy noted  SKIN: No rashes or lesions

## 2025-06-10 NOTE — PROGRESS NOTE ADULT - ASSESSMENT
68 yo M with PMH DM, HTN, HLD, and CAD (CABG 2024) who had a witnessed out of hospital cardiac arrest on 6/5 s/p 2 rounds CPR with approx. 10 minutes of downtime. Patient had recently been discharged 3 days prior from CCU with [facility]. [facility] interrogated and without any significant arrythmias.     # Acute Hypoxemic respiratory failure   # Anoxic brain injury  # Cardiac arrest, OOH, witnessed  with 9 min downtime  # Possible aspiration   # Bilateral effusions   # HO HFPEF with recent admission for HFPEF exacerbation and HTN emergency  # CKD   # DM, HTN, HLD, and CAD SP CABG 2024  # HO Afib     PLAN:     CNS:   - Continue vEEG  - CT with anoxic brain injury   - MRI brain for neuroprognostication   - NSE pending   - Avoid hyperthermia  - F/u Neuro recs  - Keppra BID     HEENT:   - Oral care  - ETT care    PULM:   - Aspiration precautions  - Minimal vent settings  - Monitor airway pressures  - SBT if wakes up     CARDIO:   - F/u echo   - Hep gtt for cardiac therapy     GI:   - GI ppx   - Tube feeding  - Bowel regimen     ID:   - Continue Zosyn  - F/u cultures    HEME:   - Hep gtt turned off  - Trend H&H, rpt CBC 1100   - Will need transfusion if not made CMO     ENDO:   - Insulin protocol    MSK:   - Bedrest     MICU  B/l PIVs  DNR/intubate

## 2025-06-10 NOTE — PROGRESS NOTE ADULT - SUBJECTIVE AND OBJECTIVE BOX
Over Night Events: events noted, remains critically ill, still intubated, ventilated, Neuro/ palliative reviewed    PHYSICAL EXAM    ICU Vital Signs Last 24 Hrs  T(C): 36 (10 Lambert 2025 04:00), Max: 37.7 (09 Jun 2025 16:00)  T(F): 96.8 (10 Lambert 2025 04:00), Max: 99.8 (09 Jun 2025 16:00)  HR: 36 (10 Lambert 2025 05:00) (36 - 66)  BP: 114/56 (10 Lambert 2025 05:00) (111/53 - 166/68)  BP(mean): 81 (10 Lambert 2025 05:00) (75 - 118)  RR: 11 (10 Lambert 2025 05:00) (5 - 44)  SpO2: 100% (10 Lambert 2025 05:00) (94% - 100%)    O2 Parameters below as of 10 Lambert 2025 06:00  Patient On (Oxygen Delivery Method): ventilator    O2 Concentration (%): 40        General: ill looking  ETT  Lungs: Bilateral Rhonchi  Cardiovascular: Regular   Abdomen: Soft, Positive BS  not following commands      06-08-25 @ 07:01  -  06-09-25 @ 07:00  --------------------------------------------------------  IN:    Enteral Tube Flush: 100 mL    FentaNYL: 175 mL    FentaNYL: 245 mL    Heparin Infusion: 200 mL    IV PiggyBack: 350 mL    Jevity 1.2: 1140 mL    Propofol: 604.9 mL  Total IN: 2814.9 mL    OUT:    Indwelling Catheter - Urethral (mL): 1555 mL    Lactated Ringers: 0 mL  Total OUT: 1555 mL    Total NET: 1259.9 mL      06-09-25 @ 07:01  -  06-10-25 @ 06:02  --------------------------------------------------------  IN:    Enteral Tube Flush: 50 mL    FentaNYL: 402.5 mL    IV PiggyBack: 200 mL    Jevity 1.2: 1080 mL    Propofol: 765.5 mL  Total IN: 2498 mL    OUT:    Indwelling Catheter - Urethral (mL): 1100 mL  Total OUT: 1100 mL    Total NET: 1398 mL          LABS:                          7.4    7.53  )-----------( 179      ( 10 Lambert 2025 04:27 )             22.9                                               06-10    134[L]  |  98  |  50[H]  ----------------------------<  182[H]  4.0   |  20  |  2.8[H]    Ca    7.6[L]      10 Lambert 2025 04:27  Phos  7.2     06-10  Mg     2.2     06-10    TPro  4.8[L]  /  Alb  2.5[L]  /  TBili  0.5  /  DBili  x   /  AST  35  /  ALT  18  /  AlkPhos  157[H]  06-10      PTT - ( 09 Jun 2025 05:20 )  PTT:68.0 sec                                       Urinalysis Basic - ( 10 Lambert 2025 04:27 )    Color: x / Appearance: x / SG: x / pH: x  Gluc: 182 mg/dL / Ketone: x  / Bili: x / Urobili: x   Blood: x / Protein: x / Nitrite: x   Leuk Esterase: x / RBC: x / WBC x   Sq Epi: x / Non Sq Epi: x / Bacteria: x                                                  LIVER FUNCTIONS - ( 10 Lambert 2025 04:27 )  Alb: 2.5 g/dL / Pro: 4.8 g/dL / ALK PHOS: 157 U/L / ALT: 18 U/L / AST: 35 U/L / GGT: x                                                                                               Mode: AC/ CMV (Assist Control/ Continuous Mandatory Ventilation)  RR (machine): 20  TV (machine): 440  FiO2: 40  PEEP: 8  ITime: 0.9  MAP: 15  PIP: 33                                          MEDICATIONS  (STANDING):  aspirin  chewable 81 milliGRAM(s) Oral daily  atorvastatin 80 milliGRAM(s) Oral at bedtime  chlorhexidine 0.12% Liquid 15 milliLiter(s) Oral Mucosa every 12 hours  chlorhexidine 2% Cloths 1 Application(s) Topical <User Schedule>  dextrose 5%. 1000 milliLiter(s) (50 mL/Hr) IV Continuous <Continuous>  dextrose 50% Injectable 25 Gram(s) IV Push once  fentaNYL   Infusion 0.5 MICROgram(s)/kG/Hr (4.38 mL/Hr) IV Continuous <Continuous>  glucagon  Injectable 1 milliGRAM(s) IntraMuscular once  insulin lispro (ADMELOG) corrective regimen sliding scale   SubCutaneous every 6 hours  lacosamide Solution 150 milliGRAM(s) Oral two times a day  lactated ringers. 500 milliLiter(s) (100 mL/Hr) IV Continuous <Continuous>  levETIRAcetam   Injectable 1500 milliGRAM(s) IV Push every 12 hours  naloxegol 12.5 milliGRAM(s) Oral daily  piperacillin/tazobactam IVPB.. 3.375 Gram(s) IV Intermittent every 8 hours  polyethylene glycol 3350 17 Gram(s) Oral two times a day  propofol Infusion 0.495 MICROgram(s)/kG/Min (0.26 mL/Hr) IV Continuous <Continuous>  senna 2 Tablet(s) Oral at bedtime    MEDICATIONS  (PRN):  dextrose Oral Gel 15 Gram(s) Oral once PRN Blood Glucose LESS THAN 70 milliGRAM(s)/deciliter      cxr noted     Over Night Events: events noted, remains critically ill, still intubated, ventilated, Neuro/ palliative reviewed, seizure activity, ap ativan    PHYSICAL EXAM    ICU Vital Signs Last 24 Hrs  T(C): 36 (10 Lambert 2025 04:00), Max: 37.7 (09 Jun 2025 16:00)  T(F): 96.8 (10 Lambert 2025 04:00), Max: 99.8 (09 Jun 2025 16:00)  HR: 36 (10 Lambert 2025 05:00) (36 - 66)  BP: 114/56 (10 Lambert 2025 05:00) (111/53 - 166/68)  BP(mean): 81 (10 Lambert 2025 05:00) (75 - 118)  RR: 11 (10 Lambert 2025 05:00) (5 - 44)  SpO2: 100% (10 Lambert 2025 05:00) (94% - 100%)    O2 Parameters below as of 10 Lambert 2025 06:00  Patient On (Oxygen Delivery Method): ventilator    O2 Concentration (%): 40        General: ill looking  ETT  Lungs: Bilateral Rhonchi  Cardiovascular: Regular   Abdomen: Soft, Positive BS  not following commands      06-08-25 @ 07:01  -  06-09-25 @ 07:00  --------------------------------------------------------  IN:    Enteral Tube Flush: 100 mL    FentaNYL: 175 mL    FentaNYL: 245 mL    Heparin Infusion: 200 mL    IV PiggyBack: 350 mL    Jevity 1.2: 1140 mL    Propofol: 604.9 mL  Total IN: 2814.9 mL    OUT:    Indwelling Catheter - Urethral (mL): 1555 mL    Lactated Ringers: 0 mL  Total OUT: 1555 mL    Total NET: 1259.9 mL      06-09-25 @ 07:01  -  06-10-25 @ 06:02  --------------------------------------------------------  IN:    Enteral Tube Flush: 50 mL    FentaNYL: 402.5 mL    IV PiggyBack: 200 mL    Jevity 1.2: 1080 mL    Propofol: 765.5 mL  Total IN: 2498 mL    OUT:    Indwelling Catheter - Urethral (mL): 1100 mL  Total OUT: 1100 mL    Total NET: 1398 mL          LABS:                          7.4    7.53  )-----------( 179      ( 10 Lambert 2025 04:27 )             22.9                                               06-10    134[L]  |  98  |  50[H]  ----------------------------<  182[H]  4.0   |  20  |  2.8[H]    Ca    7.6[L]      10 Lambert 2025 04:27  Phos  7.2     06-10  Mg     2.2     06-10    TPro  4.8[L]  /  Alb  2.5[L]  /  TBili  0.5  /  DBili  x   /  AST  35  /  ALT  18  /  AlkPhos  157[H]  06-10      PTT - ( 09 Jun 2025 05:20 )  PTT:68.0 sec                                       Urinalysis Basic - ( 10 Lambert 2025 04:27 )    Color: x / Appearance: x / SG: x / pH: x  Gluc: 182 mg/dL / Ketone: x  / Bili: x / Urobili: x   Blood: x / Protein: x / Nitrite: x   Leuk Esterase: x / RBC: x / WBC x   Sq Epi: x / Non Sq Epi: x / Bacteria: x                                                  LIVER FUNCTIONS - ( 10 Lambert 2025 04:27 )  Alb: 2.5 g/dL / Pro: 4.8 g/dL / ALK PHOS: 157 U/L / ALT: 18 U/L / AST: 35 U/L / GGT: x                                                                                               Mode: AC/ CMV (Assist Control/ Continuous Mandatory Ventilation)  RR (machine): 20  TV (machine): 440  FiO2: 40  PEEP: 8  ITime: 0.9  MAP: 15  PIP: 33                                          MEDICATIONS  (STANDING):  aspirin  chewable 81 milliGRAM(s) Oral daily  atorvastatin 80 milliGRAM(s) Oral at bedtime  chlorhexidine 0.12% Liquid 15 milliLiter(s) Oral Mucosa every 12 hours  chlorhexidine 2% Cloths 1 Application(s) Topical <User Schedule>  dextrose 5%. 1000 milliLiter(s) (50 mL/Hr) IV Continuous <Continuous>  dextrose 50% Injectable 25 Gram(s) IV Push once  fentaNYL   Infusion 0.5 MICROgram(s)/kG/Hr (4.38 mL/Hr) IV Continuous <Continuous>  glucagon  Injectable 1 milliGRAM(s) IntraMuscular once  insulin lispro (ADMELOG) corrective regimen sliding scale   SubCutaneous every 6 hours  lacosamide Solution 150 milliGRAM(s) Oral two times a day  lactated ringers. 500 milliLiter(s) (100 mL/Hr) IV Continuous <Continuous>  levETIRAcetam   Injectable 1500 milliGRAM(s) IV Push every 12 hours  naloxegol 12.5 milliGRAM(s) Oral daily  piperacillin/tazobactam IVPB.. 3.375 Gram(s) IV Intermittent every 8 hours  polyethylene glycol 3350 17 Gram(s) Oral two times a day  propofol Infusion 0.495 MICROgram(s)/kG/Min (0.26 mL/Hr) IV Continuous <Continuous>  senna 2 Tablet(s) Oral at bedtime    MEDICATIONS  (PRN):  dextrose Oral Gel 15 Gram(s) Oral once PRN Blood Glucose LESS THAN 70 milliGRAM(s)/deciliter      cxr noted     Over Night Events: events noted, remains critically ill, still intubated, ventilated, Neuro/ palliative reviewed, seizure activity, sp ativan    PHYSICAL EXAM    ICU Vital Signs Last 24 Hrs  T(C): 36 (10 Lambert 2025 04:00), Max: 37.7 (09 Jun 2025 16:00)  T(F): 96.8 (10 Lambert 2025 04:00), Max: 99.8 (09 Jun 2025 16:00)  HR: 36 (10 Lambert 2025 05:00) (36 - 66)  BP: 114/56 (10 Lambert 2025 05:00) (111/53 - 166/68)  BP(mean): 81 (10 Lambert 2025 05:00) (75 - 118)  RR: 11 (10 Lambert 2025 05:00) (5 - 44)  SpO2: 100% (10 Lambert 2025 05:00) (94% - 100%)    O2 Parameters below as of 10 Lambert 2025 06:00  Patient On (Oxygen Delivery Method): ventilator    O2 Concentration (%): 40        General: ill looking  ETT  Lungs: Bilateral Rhonchi  Cardiovascular: Regular   Abdomen: Soft, Positive BS  not following commands      06-08-25 @ 07:01  -  06-09-25 @ 07:00  --------------------------------------------------------  IN:    Enteral Tube Flush: 100 mL    FentaNYL: 175 mL    FentaNYL: 245 mL    Heparin Infusion: 200 mL    IV PiggyBack: 350 mL    Jevity 1.2: 1140 mL    Propofol: 604.9 mL  Total IN: 2814.9 mL    OUT:    Indwelling Catheter - Urethral (mL): 1555 mL    Lactated Ringers: 0 mL  Total OUT: 1555 mL    Total NET: 1259.9 mL      06-09-25 @ 07:01  -  06-10-25 @ 06:02  --------------------------------------------------------  IN:    Enteral Tube Flush: 50 mL    FentaNYL: 402.5 mL    IV PiggyBack: 200 mL    Jevity 1.2: 1080 mL    Propofol: 765.5 mL  Total IN: 2498 mL    OUT:    Indwelling Catheter - Urethral (mL): 1100 mL  Total OUT: 1100 mL    Total NET: 1398 mL          LABS:                          7.4    7.53  )-----------( 179      ( 10 Lambert 2025 04:27 )             22.9                                               06-10    134[L]  |  98  |  50[H]  ----------------------------<  182[H]  4.0   |  20  |  2.8[H]    Ca    7.6[L]      10 Lambert 2025 04:27  Phos  7.2     06-10  Mg     2.2     06-10    TPro  4.8[L]  /  Alb  2.5[L]  /  TBili  0.5  /  DBili  x   /  AST  35  /  ALT  18  /  AlkPhos  157[H]  06-10      PTT - ( 09 Jun 2025 05:20 )  PTT:68.0 sec                                       Urinalysis Basic - ( 10 Lambert 2025 04:27 )    Color: x / Appearance: x / SG: x / pH: x  Gluc: 182 mg/dL / Ketone: x  / Bili: x / Urobili: x   Blood: x / Protein: x / Nitrite: x   Leuk Esterase: x / RBC: x / WBC x   Sq Epi: x / Non Sq Epi: x / Bacteria: x                                                  LIVER FUNCTIONS - ( 10 Lambert 2025 04:27 )  Alb: 2.5 g/dL / Pro: 4.8 g/dL / ALK PHOS: 157 U/L / ALT: 18 U/L / AST: 35 U/L / GGT: x                                                                                               Mode: AC/ CMV (Assist Control/ Continuous Mandatory Ventilation)  RR (machine): 20  TV (machine): 440  FiO2: 40  PEEP: 8  ITime: 0.9  MAP: 15  PIP: 33                                          MEDICATIONS  (STANDING):  aspirin  chewable 81 milliGRAM(s) Oral daily  atorvastatin 80 milliGRAM(s) Oral at bedtime  chlorhexidine 0.12% Liquid 15 milliLiter(s) Oral Mucosa every 12 hours  chlorhexidine 2% Cloths 1 Application(s) Topical <User Schedule>  dextrose 5%. 1000 milliLiter(s) (50 mL/Hr) IV Continuous <Continuous>  dextrose 50% Injectable 25 Gram(s) IV Push once  fentaNYL   Infusion 0.5 MICROgram(s)/kG/Hr (4.38 mL/Hr) IV Continuous <Continuous>  glucagon  Injectable 1 milliGRAM(s) IntraMuscular once  insulin lispro (ADMELOG) corrective regimen sliding scale   SubCutaneous every 6 hours  lacosamide Solution 150 milliGRAM(s) Oral two times a day  lactated ringers. 500 milliLiter(s) (100 mL/Hr) IV Continuous <Continuous>  levETIRAcetam   Injectable 1500 milliGRAM(s) IV Push every 12 hours  naloxegol 12.5 milliGRAM(s) Oral daily  piperacillin/tazobactam IVPB.. 3.375 Gram(s) IV Intermittent every 8 hours  polyethylene glycol 3350 17 Gram(s) Oral two times a day  propofol Infusion 0.495 MICROgram(s)/kG/Min (0.26 mL/Hr) IV Continuous <Continuous>  senna 2 Tablet(s) Oral at bedtime    MEDICATIONS  (PRN):  dextrose Oral Gel 15 Gram(s) Oral once PRN Blood Glucose LESS THAN 70 milliGRAM(s)/deciliter      cxr noted

## 2025-06-10 NOTE — PROGRESS NOTE ADULT - SUBJECTIVE AND OBJECTIVE BOX
HPI:     Patient is a 69 year old male with PMH of DM, HTN, HLD, and CAD (CABG 2024) presenting post-cardiac arrest. Per EMS, 911 was called by family after patient had a witnessed cardiac arrest at home; EMS states they arrived to the home shortly thereafter (approximately 5 minutes); initial rhythm PEA, patient intubated in the field, and ROSC after two rounds of CPR with Epi x2 given.   Spoke with wife, patient was fine all day, before he had the arrest he was lying down, complained of SOB them passed out.     Interval history  -Patient seen at bedside with partner Lori Lee awaiting family to arrive prior to comfort measures only     ADVANCE DIRECTIVES:     [ ] Full Code [ x] DNR  MOLST  [ ]  Living Will  [ ]   DECISION MAKER(s):  [ ] Health Care Proxy(s)  [x ] Surrogate(s)  [ ] Guardian           Name(s): Phone Number(s):  Partner Lori Mayfield       BASELINE (I)ADL(s) (prior to admission):    [place]: [ ]Total  [ ] Moderate [ ]Dependent  Palliative Performance Status Version 2:         %    http://npcrc.org/files/news/palliative_performance_scale_ppsv2.pdf    Allergies    No Known Allergies    Intolerances    MEDICATIONS  (STANDING):  aspirin  chewable 81 milliGRAM(s) Oral daily  atorvastatin 80 milliGRAM(s) Oral at bedtime  chlorhexidine 0.12% Liquid 15 milliLiter(s) Oral Mucosa every 12 hours  chlorhexidine 2% Cloths 1 Application(s) Topical <User Schedule>  dextrose 5%. 1000 milliLiter(s) (50 mL/Hr) IV Continuous <Continuous>  dextrose 50% Injectable 25 Gram(s) IV Push once  fentaNYL   Infusion 0.5 MICROgram(s)/kG/Hr (4.38 mL/Hr) IV Continuous <Continuous>  glucagon  Injectable 1 milliGRAM(s) IntraMuscular once  insulin lispro (ADMELOG) corrective regimen sliding scale   SubCutaneous every 6 hours  lacosamide Solution 150 milliGRAM(s) Oral two times a day  lactated ringers. 500 milliLiter(s) (100 mL/Hr) IV Continuous <Continuous>  levETIRAcetam   Injectable 1500 milliGRAM(s) IV Push every 12 hours  naloxegol 12.5 milliGRAM(s) Oral daily  piperacillin/tazobactam IVPB.. 3.375 Gram(s) IV Intermittent every 8 hours  polyethylene glycol 3350 17 Gram(s) Oral two times a day  propofol Infusion. 20 MICROgram(s)/kG/Min (10.5 mL/Hr) IV Continuous <Continuous>  senna 2 Tablet(s) Oral at bedtime    MEDICATIONS  (PRN):  dextrose Oral Gel 15 Gram(s) Oral once PRN Blood Glucose LESS THAN 70 milliGRAM(s)/deciliter        PRESENT SYMPTOMS: [ x]Unable to obtain due to poor mentation   Source if other than patient:  [ ]Family   [ ]Team     Pain: [ ]yes [ ]no  ALL PAIN ASSESSMENT COMPONENTS WERE ASKED ABOUT UNLESS OTHERWISE NOTED  QOL impact -   Location -                    Aggravating factors -  Quality -  Radiation -  Timing-  Severity (0-10 scale):  Minimal acceptable level (0-10 scale):     CPOT:  0  https://www.[email or web address]/getattachment/[email or web address]/Critical-Care-Pain-Observation-Tool-(CPOT)    PAIN AD Score:   [email or web address].[email or web address]/painad.pdf (press ctrl +  left click to view)    Dyspnea:                           [ ]None[ ]Mild [ ]Moderate [ ]Severe     Respiratory Distress Observation Scale (RDOS): 0  A score of 0 to 2 signifies little or no respiratory distress, 3 signifies mild distress, scores 4 to 6 indicate moderate distress, and scores greater than 7 signify severe distress  [email or web address]/[email or web address]/files/PDFS/111170-juexvnyluid-xvcmtvxr-ihqnfnmzwya-ifoft.pdf    Anxiety:                             [ ]None[ ]Mild [ ]Moderate [ ]Severe   Fatigue:                             [ ]None[ ]Mild [ ]Moderate [ ]Severe   Nausea:                             [ ]None[ ]Mild [ ]Moderate [ ]Severe   Loss of appetite:              [ ]None[ ]Mild [ ]Moderate [ ]Severe   Constipation:                    [ ]None[ ]Mild [ ]Moderate [ ]Severe    Other Symptoms:  [ ]All other review of systems negative     Palliative Performance Status Version 2:        10%    http://[email or web address]/files/news/palliative_performance_scale_[email or web address].pdf    PHYSICAL EXAM:  Vital Signs Last 24 Hrs  T(C): 36.6 (10 Lambert 2025 13:00), Max: 37.7 (09 Jun 2025 16:00)  T(F): 97.8 (10 Lambert 2025 13:00), Max: 99.8 (09 Jun 2025 16:00)  HR: 45 (10 Lambert 2025 13:25) (36 - 58)  BP: 122/54 (10 Lambert 2025 13:00) (101/59 - 166/68)  BP(mean): 78 (10 Lambert 2025 13:00) (73 - 98)  RR: 20 (10 Lambert 2025 13:00) (5 - 27)  SpO2: 98% (10 Lambert 2025 13:25) (93% - 100%)    Parameters below as of 10 Lambert 2025 13:00  Patient On (Oxygen Delivery Method): ventilator    O2 Concentration (%): 40    GENERAL:  [ ] No acute distress [ ]Lethargic  [ x]Unarousable  [ ]Verbal  [ ]Non-Verbal [ ]Cachexia    BEHAVIORAL/PSYCH:  [ ]Alert and Oriented x  [ ] Anxiety [ ] Delirium [ ] Agitation [x ] Calm   EYES: [x ] No scleral icterus [ ] Scleral icterus [ ] Closed  ENMT:  [ ]Dry mouth  [x ]No external oral lesions [ ] No external ear or nose lesions  CARDIOVASCULAR:  [ ]Regular [ ]Irregular [ ]Tachy [ ]Not Tachy  [ ]Dre [ ] Edema [ ] No edema  PULMONARY:  [ ]Tachypnea  [ ]Audible excessive secretions [x ] No labored breathing [ ] labored breathing  GASTROINTESTINAL: [ ]Soft  [ ]Distended  [ ]Not distended [ ]Non tender [ ]Tender  MUSCULOSKELETAL: [ ]No clubbing [ ] clubbing  [ ] No cyanosis [ ] cyanosis  NEUROLOGIC: [ ]No focal deficits  [ ]Follows commands  [ x]Does not follow commands  [ ]Cognitive impairment  [ ]Dysphagia  [ ]Dysarthria  [ ]Paresis   SKIN: [ ] Jaundiced [ ] Non-jaundiced [ ]Rash [ ]No Rash [ ] Warm [ ] Dry  MISC/LINES: [x ] ET tube [ ] Trach [ ]NGT/OGT [ ]PEG [ ]Cueto    LABS: Interpreted by me - BMP shows elevated creatinine, CBC shows anemia, LFTs show low total protein and albumin                                   7.4    7.53  )-----------( 179      ( 10 Lambert 2025 04:27 )             22.9       06-10    134[L]  |  98  |  50[H]  ----------------------------<  182[H]  4.0   |  20  |  2.8[H]    Ca    7.6[L]      10 Lambert 2025 04:27  Phos  7.2     06-10  Mg     2.2     06-10    TPro  4.8[L]  /  Alb  2.5[L]  /  TBili  0.5  /  DBili  x   /  AST  35  /  ALT  18  /  AlkPhos  157[H]  06-10              Urinalysis Basic - ( 10 Lambert 2025 04:27 )    Color: x / Appearance: x / SG: x / pH: x  Gluc: 182 mg/dL / Ketone: x  / Bili: x / Urobili: x   Blood: x / Protein: x / Nitrite: x   Leuk Esterase: x / RBC: x / WBC x   Sq Epi: x / Non Sq Epi: x / Bacteria: x        PTT - ( 09 Jun 2025 05:20 )  PTT:68.0 sec          CAPILLARY BLOOD GLUCOSE      POCT Blood Glucose.: 104 mg/dL (10 Lambert 2025 12:36)                RADIOLOGY & ADDITIONAL STUDIES: Interpreted by me  < from: Xray Chest 1 View- PORTABLE-Routine (Xray Chest 1 View- PORTABLE-Routine in AM.) (06.10.25 @ 03:57) >  IMPRESSION: Low lung volume.    Bilateral opacities, unchanged.    Support tubes as above    < end of copied text >          EKG: Interpreted by me    < from: 12 Lead ECG (06.06.25 @ 00:53) >  Ventricular Rate 74 BPM    Atrial Rate 74 BPM    P-R Interval 154 ms    QRS Duration 98 ms    Q-T Interval 396 ms    QTC Calculation(Bazett) 439 ms    P Axis 73 degrees    R Axis 87 degrees    T Axis 162 degrees    Diagnosis Line Normal sinus rhythm  ST & T wave abnormality, consider inferior ischemia  Abnormal ECG    < end of copied text >          PROTEIN CALORIE MALNUTRITION PRESENT: [ ]mild [ ]moderate [ ]severe [ ]underweight [ ]morbid obesity  https://www.andeal.org/vault/6230/web/files/ONC/Table_Clinical%20Characteristics%20to%20Document%20Malnutrition-White%20JV%20et%20al%202012.pdf    Height (cm): 177.8 (06-06-25 @ 04:00), 172.7 (06-03-25 @ 12:51)  Weight (kg): 87.6 (06-06-25 @ 04:00), 97 (06-01-25 @ 01:59)  BMI (kg/m2): 27.7 (06-06-25 @ 04:00), 32.5 (06-03-25 @ 12:51)  [ ]PPSV2 < or = to 30% [ ]significant weight loss  [ ]poor nutritional intake  [ ]anasarca      [ ]Artificial Nutrition      Palliative Care Spiritual/Emotional Screening Tool Question  Severity (0-4):                    OR                    [ x] Unable to determine. Will assess at later time if appropriate.  Score of 2 or greater indicates recommendation of Chaplaincy and/or SW referral  Chaplaincy Referral: [ ] Yes [ ] Refused [ ] Following     Caregiver Pierz:  [ ] Yes [ ] No    OR    [x ] Unable to determine. Will assess at later time if appropriate.  Social Work Referral [ ]  Patient and Family Centered Care Referral [ ]    Anticipatory Grief Present: [ x] Yes [ ] No    OR     [ ] Unable to determine. Will assess at later time if appropriate.  Social Work Referral [x ]  Patient and Family Centered Care Referral [ ]    Patient discussed with primary medical team MD  Palliative care education provided to patient and/or family

## 2025-06-10 NOTE — DISCHARGE NOTE FOR THE EXPIRED PATIENT - HOSPITAL COURSE
68 yo M with PMH DM, HTN, HLD, and CAD (CABG 2024) who had a witnessed out of hospital PEA cardiac arrest on 6/5 s/p 2 rounds CPR with approximately 10 minutes of downtime. ROSC was achieved and patient was successfully intubated in the ED. Patient had recently been discharged 3 days prior from CCU for pulmonary edema with hypertensive urgency with MCOT. MCOT interrogated and showed a PEA arrest without arrythmia. Patient was admitted to MICU and noted GCS3T. Developed status epilepticus requiring propofol drip. On CTH for neuroprognostication patient was noted with anoxic brain injury. Poor prognosis was shared with family who decided to make patient comfort care and he was palliatively extubated on 6/10/2025 with time of death at 16:05 70 yo M with PMH DM, HTN, HLD, and CAD (CABG 2024) who had a witnessed out of hospital PEA cardiac arrest on 6/5 s/p 2 rounds CPR with approximately 10 minutes of downtime. ROSC was achieved and patient was successfully intubated in the ED. Patient had recently been discharged 3 days prior from CCU for pulmonary edema with hypertensive urgency with MCOT. MCOT interrogated and showed a PEA arrest without arrythmia. Patient was admitted to MICU and noted GCS3T. Developed status epilepticus requiring propofol drip. On CTH for neuroprognostication patient was noted with anoxic brain injury. Poor prognosis was shared with family who decided to make patient comfort care and he was palliatively extubated on 6/10/2025 at 15:45. Was alerted by the primary RN that the patient was no spontaneously breathing. Arrived to bedside, on examination, patient lungs with no air entry bilaterally, no carotid or radial pulses, pupils fixed and dilated. TOD was called by me at 16:05 and family at bedside were notified.     Live on [place] #- [phone]  Case discussed with Winsome Glover  Case not accepted for donation, family declined earlier in the admission.    Medical records contacted and notified.

## 2025-06-10 NOTE — PROGRESS NOTE ADULT - ASSESSMENT
IMPRESSION:    Acute Hypoxemic respiratory failure / intubated  Anoxic brain injury  acute blood loss/ anemia sp tx  SP CPA.  OOH witnessed  Down time around 9 min  Possible aspiration   Bilateral effusions   HO HFPEF with recent admission for HFPEF exacerbation and HTN emergency  CKD   DM, HTN, HLD, and CAD SP CABG 2024  HO Afib     PLAN:    CNS: VEEG.   CT shows anoxic brain injury. SAT. MRI for prognostication NSE.  Avoid hyperthermia. Neuro follow up. c/w keppra    HEENT: Oral care.  ET care     PULMONARY:  HOB @ 45 degrees.  Aspiration precautions;  Wean O2.  .  A line.  Monitor airway pressures. not weanable    CARDIOVASCULAR:  ECHO noted.  Maximize cardiac therapy and volume status     GI: GI prophylaxis.  Feeding.  Bowel regimen    RENAL:  Follow up lytes.  Correct as needed.  Monitor UO.     INFECTIOUS DISEASE: Follow up cultures.  ZOsyn finsh course    HEMATOLOGICAL:  DVT prophylaxis. Monitor CBC and coags.  hold hep    ENDOCRINE:  Follow up FS.  Insulin protocol if needed.      MUSCULOSKELETAL:  Bed rest.  Off loading     MICU     Palliative f/u  CMO no escalation  Poor prognosis

## 2025-06-11 LAB
CULTURE RESULTS: SIGNIFICANT CHANGE UP
CULTURE RESULTS: SIGNIFICANT CHANGE UP
NSE FLD-MCNC: 21.7 NG/ML — HIGH (ref 0–17.6)
NSE FLD-MCNC: 28.7 NG/ML — HIGH (ref 0–17.6)
SPECIMEN SOURCE: SIGNIFICANT CHANGE UP
SPECIMEN SOURCE: SIGNIFICANT CHANGE UP

## 2025-06-12 DIAGNOSIS — Z79.84 LONG TERM (CURRENT) USE OF ORAL HYPOGLYCEMIC DRUGS: ICD-10-CM

## 2025-06-12 DIAGNOSIS — N17.9 ACUTE KIDNEY FAILURE, UNSPECIFIED: ICD-10-CM

## 2025-06-12 DIAGNOSIS — Z95.1 PRESENCE OF AORTOCORONARY BYPASS GRAFT: ICD-10-CM

## 2025-06-12 DIAGNOSIS — I50.9 HEART FAILURE, UNSPECIFIED: ICD-10-CM

## 2025-06-12 DIAGNOSIS — F12.90 CANNABIS USE, UNSPECIFIED, UNCOMPLICATED: ICD-10-CM

## 2025-06-12 DIAGNOSIS — Z79.01 LONG TERM (CURRENT) USE OF ANTICOAGULANTS: ICD-10-CM

## 2025-06-12 DIAGNOSIS — J81.0 ACUTE PULMONARY EDEMA: ICD-10-CM

## 2025-06-12 DIAGNOSIS — I48.0 PAROXYSMAL ATRIAL FIBRILLATION: ICD-10-CM

## 2025-06-12 DIAGNOSIS — E78.5 HYPERLIPIDEMIA, UNSPECIFIED: ICD-10-CM

## 2025-06-12 DIAGNOSIS — J96.02 ACUTE RESPIRATORY FAILURE WITH HYPERCAPNIA: ICD-10-CM

## 2025-06-12 DIAGNOSIS — Z79.82 LONG TERM (CURRENT) USE OF ASPIRIN: ICD-10-CM

## 2025-06-12 DIAGNOSIS — J96.01 ACUTE RESPIRATORY FAILURE WITH HYPOXIA: ICD-10-CM

## 2025-06-12 DIAGNOSIS — I25.10 ATHEROSCLEROTIC HEART DISEASE OF NATIVE CORONARY ARTERY WITHOUT ANGINA PECTORIS: ICD-10-CM

## 2025-06-12 DIAGNOSIS — E11.22 TYPE 2 DIABETES MELLITUS WITH DIABETIC CHRONIC KIDNEY DISEASE: ICD-10-CM

## 2025-06-12 DIAGNOSIS — J06.9 ACUTE UPPER RESPIRATORY INFECTION, UNSPECIFIED: ICD-10-CM

## 2025-06-12 DIAGNOSIS — Z87.891 PERSONAL HISTORY OF NICOTINE DEPENDENCE: ICD-10-CM

## 2025-06-12 DIAGNOSIS — E87.5 HYPERKALEMIA: ICD-10-CM

## 2025-06-14 LAB
CREATININE, RNDM UR: 261.6 MG/DL — SIGNIFICANT CHANGE UP
METANEPHS 24H UR-MCNC: 1.1 — HIGH (ref 0–1)
METANEPHS 24H UR-MCNC: 476 UG/L — SIGNIFICANT CHANGE UP
NORMETANEPHRINE.: 2295 UG/L — SIGNIFICANT CHANGE UP

## 2025-06-24 NOTE — CDI QUERY NOTE - NSCDINOTECODERNAME_GEN_A_CORE_FT
Irena Rowell [facility] CCDS
Irena Rowell [facility] CCDS

## 2025-06-24 NOTE — CDI QUERY NOTE - NSCDIOTHERTXTBX_GEN_ALL_CORE_HH
There is inconsistent documentation in the medical record regarding the patient’s condition.      In order to ensure accurate coding and accuracy of the clinical record, the documentation in this patient’s medical record requires additional clarification.      The diagnosis of sepsis has been documented in the medical record on  by ED Provider:    Please review the documentation in the medical record and clarify the appropriate diagnosis (along with any supporting documentation) in your progress note and/or discharge summary.    Please clarify if the patient was found to have one of the following:     • Sepsis has been ruled in.  • Sepsis has been ruled out.  • Other (please specify):    Supporting documentation and/or clinical evidence:     ED Provider Note: ... presenting post-cardiac arrest ... Principal Discharge DX: Cardiac arrest. Secondary Diagnosis: Acute respiratory failure with hypoxia ... Patient's evaluated status post cardiac arrest with ROSC obtained in field, airway confirmed and patient treated for possible pneumonia ... SEPSIS – was this patient treated for sepsis? Yes. Presentation suggestive of: Bacterial Etiology Suspicion of sepsis at: 2025 02:40.     H&P Adult: ...  911 was called by family after patient had a witnessed cardiac arrest at home; EMS states they arrived to the home shortly thereafter (approximately 5 minutes); initial rhythm PEA, patient intubated in the field, and ROSC after two rounds of CPR with Epi x2 given ... Of note, patient was discharged from the hospital two days ago after experiencing an acute flash pulmonary event and hypertensive emergency ... Cardiac arrest - downtime around 9 minutes. PNA ... Continue broad spectrum ABX    6/10 Progress Note Adult-MICU Resident: ... Acute Hypoxemic respiratory failure. Anoxic brain injury. Cardiac arrest, OOH, witnessed  with 9 min downtime. Possible aspiration ... Continue Zosyn    6/10 Progress Note Adult-Critical Care Attending: ... Acute Hypoxemic respiratory failure / intubated. Anoxic brain injury. acute blood loss/ anemia sp tx. SP CPA.  OOH witnessed  Down time around 9 min. Possible aspiration ...  Zosyn finish course .. CMO no escalation. Poor prognosis    6/10 Discharge Note for the  Patient: ... Preliminary cause of death: Cardiopulmonary Arrest; Other: anoxic brain injury ... had a witnessed out of hospital PEA cardiac arrest on  s/p 2 rounds CPR with approximately 10 minutes of downtime. ROSC was achieved and patient was successfully intubated in the ED. Patient had recently been discharged 3 days prior from CCU for pulmonary edema with hypertensive urgency with MCOT    Per MAR: Vancomycin IVPB. Indication: bacteremia. Administered                  Zosyn IVPB. Indication: aspiration pneumonia. Administered                  Zosyn IVPB. Indication: bacteremia. Administered -6/10    Nursing VS Flowsheet:  Temp-95.5, 95.4, 96.3    Lab findings:  WBC-13.41        Thank you,   Irena Rowell RN [facility] CCDS  [phone]
Clinical documentation and/or evidence in the medical record indicates that this patient has pneumonia.  In order to ensure accurate coding and accuracy of the clinical record, the documentation in this patient’s medical record requires additional clarification.      Please include more specific documentation as to the type of pneumonia in your progress note and/or discharge summary.    Please clarify if the patient was found to have one of the following:      • Aspiration pneumonia was treated and evaluated  • Pneumonia without aspiration was treated and evaluated  • Other (specify)    Supporting documentation and/or clinical evidence:     6/6 ED Provider Note: ... presenting post-cardiac arrest ... Principal Discharge DX: Cardiac arrest. Secondary Diagnosis: Acute respiratory failure with hypoxia ... Patient's evaluated status post cardiac arrest with ROSC obtained in field, airway confirmed and patient treated for possible pneumonia     6/6 H&P Adult: ...  911 was called by family after patient had a witnessed cardiac arrest at home; EMS states they arrived to the home shortly thereafter (approximately 5 minutes); initial rhythm PEA, patient intubated in the field, and ROSC after two rounds of CPR with Epi x2 given ... Of note, patient was discharged from the hospital two days ago after experiencing an acute flash pulmonary event and hypertensive emergency ... Cardiac arrest - downtime around 9 minutes. PNA ... Continue broad spectrum ABX    6/7 Progress Note Adult-Critical Care Fellow/Attending: ... Seen being here he has had EP- evaluate for latoya, states that latoya is unlikely the cause of arrest. He may have had aspiration event - unclear ... Acute Hypoxemic respiratory failure. SP CPA.  OOH witnessed  Down time around 9 min. Possible aspiration     6/10 Progress Note Adult-Critical Care Attending: ... Acute Hypoxemic respiratory failure / intubated. Anoxic brain injury. acute blood loss/ anemia sp tx. SP CPA.  OOH witnessed  Down time around 9 min. Possible aspiration ...  Zosyn finish course     6/6 CT chest abdomen pelvis: ... 1.  No evidence for central or segmental pulmonary emboli. 2.  Left lower lobe consolidation, which may represent pneumonia in appropriate clinical setting    6/8 Chest xray: ... Cardiomegaly. Bilateral opacifications and effusions.    6/10 Chest xray: ... Bilateral opacities, unchanged    Per MAR: Vancomycin IVPB. Indication: bacteremia. Administered 6/6                 Zosyn IVPB. Indication: aspiration pneumonia. Administered 6/6                 Zosyn IVPB. Indication: bacteremia. Administered 6/6-6/10    For reference, please see the [facility] Provider Pneumonia Clinical Paper located on the [facility] Intranet - Clinical documentation improvement resources.      Thank you,   Irena Rowell RN [facility] CCDS  [phone]

## 2025-06-25 DIAGNOSIS — G40.901 EPILEPSY, UNSPECIFIED, NOT INTRACTABLE, WITH STATUS EPILEPTICUS: ICD-10-CM

## 2025-06-25 DIAGNOSIS — Z79.82 LONG TERM (CURRENT) USE OF ASPIRIN: ICD-10-CM

## 2025-06-25 DIAGNOSIS — E11.22 TYPE 2 DIABETES MELLITUS WITH DIABETIC CHRONIC KIDNEY DISEASE: ICD-10-CM

## 2025-06-25 DIAGNOSIS — I50.32 CHRONIC DIASTOLIC (CONGESTIVE) HEART FAILURE: ICD-10-CM

## 2025-06-25 DIAGNOSIS — N18.9 CHRONIC KIDNEY DISEASE, UNSPECIFIED: ICD-10-CM

## 2025-06-25 DIAGNOSIS — I48.0 PAROXYSMAL ATRIAL FIBRILLATION: ICD-10-CM

## 2025-06-25 DIAGNOSIS — I46.9 CARDIAC ARREST, CAUSE UNSPECIFIED: ICD-10-CM

## 2025-06-25 DIAGNOSIS — I25.10 ATHEROSCLEROTIC HEART DISEASE OF NATIVE CORONARY ARTERY WITHOUT ANGINA PECTORIS: ICD-10-CM

## 2025-06-25 DIAGNOSIS — Z51.5 ENCOUNTER FOR PALLIATIVE CARE: ICD-10-CM

## 2025-06-25 DIAGNOSIS — G93.1 ANOXIC BRAIN DAMAGE, NOT ELSEWHERE CLASSIFIED: ICD-10-CM

## 2025-06-25 DIAGNOSIS — A41.9 SEPSIS, UNSPECIFIED ORGANISM: ICD-10-CM

## 2025-06-25 DIAGNOSIS — E78.5 HYPERLIPIDEMIA, UNSPECIFIED: ICD-10-CM

## 2025-06-25 DIAGNOSIS — I13.0 HYPERTENSIVE HEART AND CHRONIC KIDNEY DISEASE WITH HEART FAILURE AND STAGE 1 THROUGH STAGE 4 CHRONIC KIDNEY DISEASE, OR UNSPECIFIED CHRONIC KIDNEY DISEASE: ICD-10-CM

## 2025-06-25 DIAGNOSIS — Z95.1 PRESENCE OF AORTOCORONARY BYPASS GRAFT: ICD-10-CM

## 2025-06-25 DIAGNOSIS — Z79.899 OTHER LONG TERM (CURRENT) DRUG THERAPY: ICD-10-CM

## 2025-06-25 DIAGNOSIS — Z79.84 LONG TERM (CURRENT) USE OF ORAL HYPOGLYCEMIC DRUGS: ICD-10-CM

## 2025-06-25 DIAGNOSIS — J69.0 PNEUMONITIS DUE TO INHALATION OF FOOD AND VOMIT: ICD-10-CM

## 2025-06-25 DIAGNOSIS — Z66 DO NOT RESUSCITATE: ICD-10-CM

## 2025-06-25 DIAGNOSIS — J96.01 ACUTE RESPIRATORY FAILURE WITH HYPOXIA: ICD-10-CM

## 2025-06-25 DIAGNOSIS — D62 ACUTE POSTHEMORRHAGIC ANEMIA: ICD-10-CM

## 2025-07-17 ENCOUNTER — APPOINTMENT (OUTPATIENT)
Dept: CARDIOLOGY | Facility: CLINIC | Age: 70
End: 2025-07-17